# Patient Record
Sex: MALE | Race: WHITE | Employment: OTHER | URBAN - METROPOLITAN AREA
[De-identification: names, ages, dates, MRNs, and addresses within clinical notes are randomized per-mention and may not be internally consistent; named-entity substitution may affect disease eponyms.]

---

## 2017-01-03 PROBLEM — C82.01: Status: ACTIVE | Noted: 2017-01-03

## 2017-01-03 PROBLEM — Z08 ENCOUNTER FOR FOLLOW-UP EXAMINATION AFTER COMPLETED TREATMENT FOR MALIGNANT NEOPLASM: Status: ACTIVE | Noted: 2017-01-03

## 2017-03-10 ENCOUNTER — HOSPITAL ENCOUNTER (OUTPATIENT)
Dept: CARDIOLOGY | Age: 82
Discharge: HOME | End: 2017-03-10
Attending: INTERNAL MEDICINE | Admitting: INTERNAL MEDICINE

## 2017-03-10 DIAGNOSIS — I35.0 NONRHEUMATIC AORTIC VALVE STENOSIS: ICD-10-CM

## 2017-03-10 DIAGNOSIS — I34.0 NON-RHEUMATIC MITRAL REGURGITATION: ICD-10-CM

## 2017-03-10 LAB
LV EF: 60 %
LVEF MODALITY: NORMAL

## 2017-04-11 ENCOUNTER — OFFICE VISIT (OUTPATIENT)
Dept: FAMILY MEDICINE CLINIC | Age: 82
End: 2017-04-11

## 2017-04-11 VITALS
HEART RATE: 72 BPM | SYSTOLIC BLOOD PRESSURE: 130 MMHG | BODY MASS INDEX: 25.12 KG/M2 | HEIGHT: 69 IN | DIASTOLIC BLOOD PRESSURE: 86 MMHG | WEIGHT: 169.6 LBS | TEMPERATURE: 97.9 F

## 2017-04-11 DIAGNOSIS — M54.50 ACUTE RIGHT-SIDED LOW BACK PAIN WITHOUT SCIATICA: Primary | ICD-10-CM

## 2017-04-11 PROCEDURE — G8420 CALC BMI NORM PARAMETERS: HCPCS | Performed by: FAMILY MEDICINE

## 2017-04-11 PROCEDURE — 1123F ACP DISCUSS/DSCN MKR DOCD: CPT | Performed by: FAMILY MEDICINE

## 2017-04-11 PROCEDURE — 99213 OFFICE O/P EST LOW 20 MIN: CPT | Performed by: FAMILY MEDICINE

## 2017-04-11 PROCEDURE — 1036F TOBACCO NON-USER: CPT | Performed by: FAMILY MEDICINE

## 2017-04-11 PROCEDURE — G8427 DOCREV CUR MEDS BY ELIG CLIN: HCPCS | Performed by: FAMILY MEDICINE

## 2017-04-11 PROCEDURE — 4040F PNEUMOC VAC/ADMIN/RCVD: CPT | Performed by: FAMILY MEDICINE

## 2017-04-11 RX ORDER — ASCORBIC ACID 500 MG
500 TABLET ORAL DAILY
COMMUNITY

## 2017-04-11 ASSESSMENT — ENCOUNTER SYMPTOMS
SHORTNESS OF BREATH: 0
CHEST TIGHTNESS: 0
DIARRHEA: 0
ABDOMINAL PAIN: 0
VOMITING: 0
ABDOMINAL DISTENTION: 0
COUGH: 0
CONSTIPATION: 0
NAUSEA: 0
BLOOD IN STOOL: 0

## 2017-06-26 ENCOUNTER — OFFICE VISIT (OUTPATIENT)
Dept: FAMILY MEDICINE CLINIC | Age: 82
End: 2017-06-26

## 2017-06-26 VITALS
HEIGHT: 69 IN | SYSTOLIC BLOOD PRESSURE: 124 MMHG | BODY MASS INDEX: 24.35 KG/M2 | WEIGHT: 164.4 LBS | TEMPERATURE: 97.5 F | HEART RATE: 56 BPM | DIASTOLIC BLOOD PRESSURE: 82 MMHG

## 2017-06-26 DIAGNOSIS — Z12.5 PROSTATE CANCER SCREENING: ICD-10-CM

## 2017-06-26 DIAGNOSIS — E78.2 MIXED HYPERLIPIDEMIA: ICD-10-CM

## 2017-06-26 DIAGNOSIS — R79.89 ELEVATED LIVER FUNCTION TESTS: Primary | ICD-10-CM

## 2017-06-26 DIAGNOSIS — R79.89 ELEVATED LIVER FUNCTION TESTS: ICD-10-CM

## 2017-06-26 LAB
A/G RATIO: 1.7 (ref 1.1–2.2)
ALBUMIN SERPL-MCNC: 4 G/DL (ref 3.4–5)
ALP BLD-CCNC: 74 U/L (ref 40–129)
ALT SERPL-CCNC: 14 U/L (ref 10–40)
ANION GAP SERPL CALCULATED.3IONS-SCNC: 11 MMOL/L (ref 3–16)
AST SERPL-CCNC: 33 U/L (ref 15–37)
BILIRUB SERPL-MCNC: 0.8 MG/DL (ref 0–1)
BILIRUBIN URINE: NEGATIVE
BLOOD, URINE: NEGATIVE
BUN BLDV-MCNC: 23 MG/DL (ref 7–20)
CALCIUM SERPL-MCNC: 9 MG/DL (ref 8.3–10.6)
CHLORIDE BLD-SCNC: 102 MMOL/L (ref 99–110)
CHOLESTEROL, TOTAL: 229 MG/DL (ref 0–199)
CLARITY: CLEAR
CO2: 28 MMOL/L (ref 21–32)
COLOR: YELLOW
CREAT SERPL-MCNC: 1.1 MG/DL (ref 0.8–1.3)
EPITHELIAL CELLS, UA: 0 /HPF (ref 0–5)
GFR AFRICAN AMERICAN: >60
GFR NON-AFRICAN AMERICAN: >60
GLOBULIN: 2.4 G/DL
GLUCOSE BLD-MCNC: 86 MG/DL (ref 70–99)
GLUCOSE URINE: NEGATIVE MG/DL
HDLC SERPL-MCNC: 73 MG/DL (ref 40–60)
HYALINE CASTS: 1 /LPF (ref 0–8)
KETONES, URINE: NEGATIVE MG/DL
LDL CHOLESTEROL CALCULATED: 144 MG/DL
LEUKOCYTE ESTERASE, URINE: NEGATIVE
MICROSCOPIC EXAMINATION: ABNORMAL
NITRITE, URINE: NEGATIVE
PH UA: 6.5
POTASSIUM SERPL-SCNC: 5.1 MMOL/L (ref 3.5–5.1)
PROSTATE SPECIFIC ANTIGEN: 0.89 NG/ML (ref 0–4)
PROTEIN UA: NEGATIVE MG/DL
RBC UA: 5 /HPF (ref 0–4)
SODIUM BLD-SCNC: 141 MMOL/L (ref 136–145)
SPECIFIC GRAVITY UA: 1.02
TOTAL PROTEIN: 6.4 G/DL (ref 6.4–8.2)
TRIGL SERPL-MCNC: 61 MG/DL (ref 0–150)
UROBILINOGEN, URINE: 0.2 E.U./DL
VLDLC SERPL CALC-MCNC: 12 MG/DL
WBC UA: 0 /HPF (ref 0–5)

## 2017-06-26 PROCEDURE — G8420 CALC BMI NORM PARAMETERS: HCPCS | Performed by: FAMILY MEDICINE

## 2017-06-26 PROCEDURE — 4040F PNEUMOC VAC/ADMIN/RCVD: CPT | Performed by: FAMILY MEDICINE

## 2017-06-26 PROCEDURE — G8427 DOCREV CUR MEDS BY ELIG CLIN: HCPCS | Performed by: FAMILY MEDICINE

## 2017-06-26 PROCEDURE — 1123F ACP DISCUSS/DSCN MKR DOCD: CPT | Performed by: FAMILY MEDICINE

## 2017-06-26 PROCEDURE — 99213 OFFICE O/P EST LOW 20 MIN: CPT | Performed by: FAMILY MEDICINE

## 2017-06-26 PROCEDURE — 1036F TOBACCO NON-USER: CPT | Performed by: FAMILY MEDICINE

## 2017-06-26 ASSESSMENT — ENCOUNTER SYMPTOMS
NAUSEA: 0
ABDOMINAL PAIN: 0
SHORTNESS OF BREATH: 0
CONSTIPATION: 0
BLOOD IN STOOL: 0
VOMITING: 0
DIARRHEA: 0

## 2017-06-28 DIAGNOSIS — R31.29 MICROHEMATURIA: Primary | ICD-10-CM

## 2017-07-25 DIAGNOSIS — R31.29 MICROHEMATURIA: ICD-10-CM

## 2017-07-25 LAB
BILIRUBIN URINE: NEGATIVE
BLOOD, URINE: NEGATIVE
CLARITY: CLEAR
COLOR: YELLOW
EPITHELIAL CELLS, UA: 0 /HPF (ref 0–5)
GLUCOSE URINE: NEGATIVE MG/DL
HYALINE CASTS: 0 /LPF (ref 0–8)
KETONES, URINE: NEGATIVE MG/DL
LEUKOCYTE ESTERASE, URINE: NEGATIVE
MICROSCOPIC EXAMINATION: NORMAL
NITRITE, URINE: NEGATIVE
PH UA: 6
PROTEIN UA: NEGATIVE MG/DL
RBC UA: 1 /HPF (ref 0–4)
SPECIFIC GRAVITY UA: 1.01
UROBILINOGEN, URINE: 0.2 E.U./DL
WBC UA: 0 /HPF (ref 0–5)

## 2017-08-10 ENCOUNTER — TELEPHONE (OUTPATIENT)
Dept: FAMILY MEDICINE CLINIC | Age: 82
End: 2017-08-10

## 2017-09-07 ENCOUNTER — OFFICE VISIT (OUTPATIENT)
Dept: FAMILY MEDICINE CLINIC | Age: 82
End: 2017-09-07

## 2017-09-07 VITALS
TEMPERATURE: 97.7 F | HEIGHT: 69 IN | DIASTOLIC BLOOD PRESSURE: 80 MMHG | BODY MASS INDEX: 24.35 KG/M2 | WEIGHT: 164.4 LBS | SYSTOLIC BLOOD PRESSURE: 122 MMHG

## 2017-09-07 DIAGNOSIS — K40.90 NON-RECURRENT UNILATERAL INGUINAL HERNIA WITHOUT OBSTRUCTION OR GANGRENE: Primary | ICD-10-CM

## 2017-09-07 PROCEDURE — 4040F PNEUMOC VAC/ADMIN/RCVD: CPT | Performed by: FAMILY MEDICINE

## 2017-09-07 PROCEDURE — 1123F ACP DISCUSS/DSCN MKR DOCD: CPT | Performed by: FAMILY MEDICINE

## 2017-09-07 PROCEDURE — G8420 CALC BMI NORM PARAMETERS: HCPCS | Performed by: FAMILY MEDICINE

## 2017-09-07 PROCEDURE — 1036F TOBACCO NON-USER: CPT | Performed by: FAMILY MEDICINE

## 2017-09-07 PROCEDURE — 99213 OFFICE O/P EST LOW 20 MIN: CPT | Performed by: FAMILY MEDICINE

## 2017-09-07 PROCEDURE — G8427 DOCREV CUR MEDS BY ELIG CLIN: HCPCS | Performed by: FAMILY MEDICINE

## 2017-09-26 ENCOUNTER — INITIAL CONSULT (OUTPATIENT)
Dept: SURGERY | Age: 82
End: 2017-09-26

## 2017-09-26 VITALS
BODY MASS INDEX: 24.86 KG/M2 | DIASTOLIC BLOOD PRESSURE: 80 MMHG | WEIGHT: 164 LBS | SYSTOLIC BLOOD PRESSURE: 136 MMHG | HEIGHT: 68 IN

## 2017-09-26 DIAGNOSIS — K40.90 RIGHT INGUINAL HERNIA: Primary | ICD-10-CM

## 2017-09-26 PROCEDURE — 1123F ACP DISCUSS/DSCN MKR DOCD: CPT | Performed by: SURGERY

## 2017-09-26 PROCEDURE — G8420 CALC BMI NORM PARAMETERS: HCPCS | Performed by: SURGERY

## 2017-09-26 PROCEDURE — G8427 DOCREV CUR MEDS BY ELIG CLIN: HCPCS | Performed by: SURGERY

## 2017-09-26 PROCEDURE — 99203 OFFICE O/P NEW LOW 30 MIN: CPT | Performed by: SURGERY

## 2017-09-26 PROCEDURE — 1036F TOBACCO NON-USER: CPT | Performed by: SURGERY

## 2017-09-26 PROCEDURE — 4040F PNEUMOC VAC/ADMIN/RCVD: CPT | Performed by: SURGERY

## 2017-09-26 ASSESSMENT — ENCOUNTER SYMPTOMS: ABDOMINAL PAIN: 1

## 2017-10-09 ENCOUNTER — PAT TELEPHONE (OUTPATIENT)
Dept: PREADMISSION TESTING | Age: 82
End: 2017-10-09

## 2017-10-09 VITALS — HEIGHT: 70 IN | WEIGHT: 165 LBS | BODY MASS INDEX: 23.62 KG/M2

## 2017-10-09 NOTE — PROGRESS NOTES
C-Difficile admission screening and protocol:     * Admitted with diarrhea? YES____    NO___X__     *Prior history of C-Diff. In last 3 months? YES____   NO__X___     *Antibiotic use in the past 6-8 weeks? NO____X__YES______                 If yes which  ANTIBIOTIC AND REASON______     *Prior hospitalization or nursing home in the last month?  YES____   NO____X

## 2017-10-13 ENCOUNTER — HOSPITAL ENCOUNTER (OUTPATIENT)
Dept: SURGERY | Age: 82
Discharge: HOME | End: 2017-10-13
Attending: SURGERY | Admitting: SURGERY

## 2017-10-13 VITALS
HEART RATE: 74 BPM | WEIGHT: 165 LBS | HEIGHT: 70 IN | OXYGEN SATURATION: 95 % | DIASTOLIC BLOOD PRESSURE: 99 MMHG | TEMPERATURE: 97.6 F | SYSTOLIC BLOOD PRESSURE: 161 MMHG | RESPIRATION RATE: 16 BRPM | BODY MASS INDEX: 23.62 KG/M2

## 2017-10-13 PROCEDURE — 49505 PRP I/HERN INIT REDUC >5 YR: CPT | Performed by: SURGERY

## 2017-10-13 RX ORDER — OXYCODONE HYDROCHLORIDE 5 MG/1
5 TABLET ORAL EVERY 4 HOURS PRN
Qty: 30 TABLET | Refills: 0 | Status: SHIPPED | OUTPATIENT
Start: 2017-10-13 | End: 2017-11-07 | Stop reason: CLARIF

## 2017-10-13 RX ORDER — FENTANYL CITRATE 50 UG/ML
50 INJECTION, SOLUTION INTRAMUSCULAR; INTRAVENOUS EVERY 5 MIN PRN
Status: DISCONTINUED | OUTPATIENT
Start: 2017-10-13 | End: 2017-10-14 | Stop reason: HOSPADM

## 2017-10-13 RX ORDER — PROMETHAZINE HYDROCHLORIDE 25 MG/ML
6.25 INJECTION, SOLUTION INTRAMUSCULAR; INTRAVENOUS
Status: ACTIVE | OUTPATIENT
Start: 2017-10-13 | End: 2017-10-13

## 2017-10-13 RX ORDER — FENTANYL CITRATE 50 UG/ML
25 INJECTION, SOLUTION INTRAMUSCULAR; INTRAVENOUS EVERY 5 MIN PRN
Status: DISCONTINUED | OUTPATIENT
Start: 2017-10-13 | End: 2017-10-14 | Stop reason: HOSPADM

## 2017-10-13 RX ORDER — LABETALOL HYDROCHLORIDE 5 MG/ML
5 INJECTION, SOLUTION INTRAVENOUS EVERY 10 MIN PRN
Status: DISCONTINUED | OUTPATIENT
Start: 2017-10-13 | End: 2017-10-14 | Stop reason: HOSPADM

## 2017-10-13 ASSESSMENT — PAIN SCALES - GENERAL: PAINLEVEL_OUTOF10: 0

## 2017-10-13 ASSESSMENT — PAIN - FUNCTIONAL ASSESSMENT: PAIN_FUNCTIONAL_ASSESSMENT: 0-10

## 2017-10-13 NOTE — PROGRESS NOTES
Tolerates PO and sitting up in chair. Urgency to void. IV d/c'd. Pt dressing with wife's assistance.

## 2017-10-13 NOTE — ANESTHESIA POST-OP
[facility] Department of Anesthesiology  Post-Anesthesia Note       Name:  Glynn Kwok                                         Age:  80 y.o.   MRN:  [id number]     Last Vitals & Oxygen Saturation: BP (!) 161/99   Pulse 74   Temp 97.6 °F (36.4 °C) (Oral)   Resp 16   Ht 5' 10\" (1.778 m)   Wt 165 lb (74.8 kg)   SpO2 95%   BMI 23.68 kg/m²   Patient Vitals for the past 4 hrs:   BP Temp Temp src Pulse Resp SpO2   10/13/17 1612 (!) 161/99 97.6 °F (36.4 °C) Oral 74 16 -   10/13/17 1559 - - - 52 18 95 %   10/13/17 1556 - - - 59 20 100 %   10/13/17 1555 - - - 58 15 100 %   10/13/17 1547 (!) 137/91 - - 53 13 97 %   10/13/17 1540 133/80 - - 55 14 97 %   10/13/17 1535 119/83 - - 56 14 98 %   10/13/17 1532 121/76 - - 55 9 98 %   10/13/17 1524 106/70 97.6 °F (36.4 °C) Temporal 62 12 99 %       Level of consciousness: awake, alert and oriented    Respiratory: stable     Cardiovascular: stable     Hydration: stable     PONV: stable     Post-op pain: adequate analgesia    Post-op assessment: no apparent anesthetic complications and tolerated procedure well    Complications:  none    Dennis Banuelos MD  October 13, 2017   6:24 PM

## 2017-10-13 NOTE — PROGRESS NOTES
Pt alert. Denies pain. Up to chair. Gait slightly unsteady. Given crackers and juice. Called for wife. Given call light.

## 2017-11-07 ENCOUNTER — OFFICE VISIT (OUTPATIENT)
Dept: SURGERY | Age: 82
End: 2017-11-07

## 2017-11-07 ENCOUNTER — NURSE ONLY (OUTPATIENT)
Dept: FAMILY MEDICINE CLINIC | Age: 82
End: 2017-11-07

## 2017-11-07 DIAGNOSIS — Z23 NEEDS FLU SHOT: Primary | ICD-10-CM

## 2017-11-07 DIAGNOSIS — K40.90 RIGHT INGUINAL HERNIA: Primary | ICD-10-CM

## 2017-11-07 PROCEDURE — 90662 IIV NO PRSV INCREASED AG IM: CPT | Performed by: FAMILY MEDICINE

## 2017-11-07 PROCEDURE — 99024 POSTOP FOLLOW-UP VISIT: CPT | Performed by: SURGERY

## 2017-11-07 PROCEDURE — G0008 ADMIN INFLUENZA VIRUS VAC: HCPCS | Performed by: FAMILY MEDICINE

## 2017-12-29 ENCOUNTER — OFFICE VISIT (OUTPATIENT)
Dept: FAMILY MEDICINE CLINIC | Age: 82
End: 2017-12-29

## 2017-12-29 VITALS
SYSTOLIC BLOOD PRESSURE: 130 MMHG | HEART RATE: 68 BPM | WEIGHT: 172.4 LBS | TEMPERATURE: 98 F | DIASTOLIC BLOOD PRESSURE: 78 MMHG | HEIGHT: 70 IN | BODY MASS INDEX: 24.68 KG/M2

## 2017-12-29 DIAGNOSIS — E78.2 MIXED HYPERLIPIDEMIA: Primary | ICD-10-CM

## 2017-12-29 PROCEDURE — 1036F TOBACCO NON-USER: CPT | Performed by: FAMILY MEDICINE

## 2017-12-29 PROCEDURE — G8420 CALC BMI NORM PARAMETERS: HCPCS | Performed by: FAMILY MEDICINE

## 2017-12-29 PROCEDURE — G8484 FLU IMMUNIZE NO ADMIN: HCPCS | Performed by: FAMILY MEDICINE

## 2017-12-29 PROCEDURE — 1123F ACP DISCUSS/DSCN MKR DOCD: CPT | Performed by: FAMILY MEDICINE

## 2017-12-29 PROCEDURE — G8427 DOCREV CUR MEDS BY ELIG CLIN: HCPCS | Performed by: FAMILY MEDICINE

## 2017-12-29 PROCEDURE — 4040F PNEUMOC VAC/ADMIN/RCVD: CPT | Performed by: FAMILY MEDICINE

## 2017-12-29 PROCEDURE — 99213 OFFICE O/P EST LOW 20 MIN: CPT | Performed by: FAMILY MEDICINE

## 2017-12-29 ASSESSMENT — ENCOUNTER SYMPTOMS
VOMITING: 0
SHORTNESS OF BREATH: 0
NAUSEA: 0
BLOOD IN STOOL: 0
ABDOMINAL PAIN: 0
CONSTIPATION: 0
DIARRHEA: 0
ABDOMINAL DISTENTION: 0
CHEST TIGHTNESS: 0

## 2018-05-16 NOTE — ANESTHESIA PRE-OP
[facility] Department of Anesthesiology  Pre-Anesthesia Evaluation/Consultation       Name:  Karolina Huang  : 1931  Age:  80 y.o. MRN:  [id number]  Date: 10/13/2017           Procedure (Scheduled):  Right inguinal hernia repair  Surgeon:  Dr. 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  Pt active plays basketball once a week     Medications  Current Outpatient Prescriptions on File Prior to Encounter   Medication Sig Dispense Refill    aspirin 81 MG tablet Take 81 mg by mouth daily      Omega-3 Fatty Acids (FISH OIL PO) Take by mouth      Ascorbic Acid (VITAMIN C) 500 MG tablet Take 500 mg by mouth daily      sildenafil (REVATIO) 20 MG tablet Take 1 tablet by mouth as needed (1 or 2 tablets as needed for erections on empty stomach) 15 tablet 1    Vitamin D (CHOLECALCIFEROL) 1000 UNITS CAPS capsule Take No 1,000 Units by mouth daily.  Multiple Vitamins-Minerals (MULTI COMPLETE PO) Take 1 tablet by mouth daily. No current facility-administered medications on file prior to encounter. Current Outpatient Prescriptions   Medication Sig Dispense Refill    aspirin 81 MG tablet Take 81 mg by mouth daily      Omega-3 Fatty Acids (FISH OIL PO) Take by mouth      Ascorbic Acid (VITAMIN C) 500 MG tablet Take 500 mg by mouth daily      sildenafil (REVATIO) 20 MG tablet Take 1 tablet by mouth as needed (1 or 2 tablets as needed for erections on empty stomach) 15 tablet 1    Vitamin D (CHOLECALCIFEROL) 1000 UNITS CAPS capsule Take 1,000 Units by mouth daily.  Multiple Vitamins-Minerals (MULTI COMPLETE PO) Take 1 tablet by mouth daily. Current Facility-Administered Medications   Medication Dose Route Frequency Provider Last Rate Last Dose    ceFAZolin (ANCEF) 2 g in dextrose 3 % 50 mL IVPB (duplex)  2 g Intravenous Once Tere Patel MD         Vital Signs (Current)   Vitals:    10/13/17 1201   BP: (!) 155/92   Pulse: 58   Resp: 16   Temp: 97.3 °F (36.3 °C)   SpO2: 97%     Vital Signs Statistics (for past 48 hrs)     Temp  Av.3 °F (36.3 °C)  Min: 97.3 °F (36.3 °C)   Min taken time: 10/13/17 1201  Max: 97.3 °F (36.3 °C)   Max taken time: 10/13/17 1201  Pulse  Av  Min: 62   Min taken time: 10/13/17 1201  Max: 62   Max taken time: 10/13/17 1201  Resp  Av  Min: 16   Min taken time: 10/13/17 1201  Max: 16   Max taken time: 10/13/17 1201  BP  Min: 155/92   Min taken time: 10/13/17 1201  Max: 155/92   Max taken time: 10/13/17 1201  SpO2  Av %  Min: 97 %   Min taken time: 10/13/17 1201  Max: 97 %   Max taken time: 10/13/17 1201    BP Readings from Last 3 Encounters:   10/13/17 (!) 155/92   17 136/80   17 122/80     BMI  Body mass index is 23.68 kg/m².   Estimated body mass index is 23.68 kg/m² as calculated from the following:    Height as of this encounter: 5' 10\" (1.778 m). Weight as of this encounter: 165 lb (74.8 kg). CBC   Lab Results   Component Value Date    WBC 5.3 01/03/2017    WBC 4.8 08/03/2015    RBC 4.01 01/03/2017    HGB 12.5 01/03/2017    HCT 40.9 01/03/2017    .1 01/03/2017    RDW 12.9 01/03/2017     01/03/2017     CMP    Lab Results   Component Value Date     06/26/2017    K 5.1 06/26/2017     06/26/2017    CO2 28 06/26/2017    BUN 23 06/26/2017    CREATININE 1.1 06/26/2017    GFRAA >60 06/26/2017    GFRAA >60 01/31/2013    AGRATIO 1.7 06/26/2017    LABGLOM >60 06/26/2017    GLUCOSE 86 06/26/2017    GLUCOSE 97 01/03/2017    PROT 6.4 06/26/2017    PROT 6.9 01/03/2017    CALCIUM 9.0 06/26/2017    BILITOT 0.8 06/26/2017    ALKPHOS 74 06/26/2017    AST 33 06/26/2017    ALT 14 06/26/2017     BMP    Lab Results   Component Value Date     06/26/2017    K 5.1 06/26/2017     06/26/2017    CO2 28 06/26/2017    BUN 23 06/26/2017    CREATININE 1.1 06/26/2017    CALCIUM 9.0 06/26/2017    GFRAA >60 06/26/2017    GFRAA >60 01/31/2013    LABGLOM >60 06/26/2017    GLUCOSE 86 06/26/2017    GLUCOSE 97 01/03/2017     POCGlucose  No results for input(s): GLUCOSE in the last 72 hours.    Coags  No results found for: PROTIME, INR, APTT  HCG (If Applicable) No results found for: PREGTESTUR, PREGSERUM, HCG, HCGQUANT   ABGs No results found for: PHART, PO2ART, JPV0RFT, OHV4AQL, BEART, S2QBOSZP   Type & Screen (If Applicable)  No results found for: LABABO, LABRH                         BMI: Wt Readings from Last 3 Encounters:       NPO Status:   Date of last liquid consumption: 10/12/17   Time of last liquid consumption: 2300   Date of last solid food consumption: 10/12/17      Time of last solid consumption: 2300       Anesthesia Evaluation  Patient summary reviewed no history of anesthetic complications:   Airway: Mallampati: II  TM distance: >3 FB   Neck ROM: full  Mouth opening: > = 3 FB Dental: normal exam         Pulmonary:negative ROS

## 2018-09-04 ENCOUNTER — TELEPHONE (OUTPATIENT)
Dept: FAMILY MEDICINE CLINIC | Age: 83
End: 2018-09-04

## 2018-10-24 ENCOUNTER — NURSE ONLY (OUTPATIENT)
Dept: FAMILY MEDICINE CLINIC | Age: 83
End: 2018-10-24
Payer: MEDICARE

## 2018-10-24 DIAGNOSIS — Z23 NEEDS FLU SHOT: Primary | ICD-10-CM

## 2018-10-24 PROCEDURE — 90662 IIV NO PRSV INCREASED AG IM: CPT | Performed by: FAMILY MEDICINE

## 2018-10-24 PROCEDURE — G0008 ADMIN INFLUENZA VIRUS VAC: HCPCS | Performed by: FAMILY MEDICINE

## 2018-10-24 NOTE — PROGRESS NOTES
Vaccine Information Sheet, \"Influenza - Inactivated\"  given to Deidra Clark, or parent/legal guardian of  Deidra Clark and verbalized understanding. Patient responses:    Have you ever had a reaction to a flu vaccine? No  Are you able to eat eggs without adverse effects? Yes  Do you have any current illness? No  Have you ever had Guillian Sunbright Syndrome? No    Flu vaccine given per order. Please see immunization tab.

## 2019-09-06 ENCOUNTER — OFFICE VISIT (OUTPATIENT)
Dept: FAMILY MEDICINE CLINIC | Age: 84
End: 2019-09-06
Payer: MEDICARE

## 2019-09-06 VITALS
WEIGHT: 156.6 LBS | BODY MASS INDEX: 22.42 KG/M2 | HEIGHT: 70 IN | DIASTOLIC BLOOD PRESSURE: 84 MMHG | TEMPERATURE: 97.6 F | HEART RATE: 60 BPM | SYSTOLIC BLOOD PRESSURE: 124 MMHG

## 2019-09-06 DIAGNOSIS — R63.4 WEIGHT LOSS: Primary | ICD-10-CM

## 2019-09-06 DIAGNOSIS — R63.4 WEIGHT LOSS: ICD-10-CM

## 2019-09-06 DIAGNOSIS — I34.0 MITRAL VALVE INSUFFICIENCY, UNSPECIFIED ETIOLOGY: ICD-10-CM

## 2019-09-06 LAB
BASOPHILS ABSOLUTE: 0 K/UL (ref 0–0.2)
BASOPHILS RELATIVE PERCENT: 0.5 %
BILIRUBIN URINE: NEGATIVE
BLOOD, URINE: NEGATIVE
CLARITY: CLEAR
COLOR: YELLOW
EOSINOPHILS ABSOLUTE: 0.2 K/UL (ref 0–0.6)
EOSINOPHILS RELATIVE PERCENT: 3.2 %
EPITHELIAL CELLS, UA: 0 /HPF (ref 0–5)
GLUCOSE URINE: NEGATIVE MG/DL
HCT VFR BLD CALC: 41.7 % (ref 40.5–52.5)
HEMOGLOBIN: 13.6 G/DL (ref 13.5–17.5)
HYALINE CASTS: 1 /LPF (ref 0–8)
KETONES, URINE: NEGATIVE MG/DL
LEUKOCYTE ESTERASE, URINE: NEGATIVE
LYMPHOCYTES ABSOLUTE: 0.4 K/UL (ref 1–5.1)
LYMPHOCYTES RELATIVE PERCENT: 8.3 %
MCH RBC QN AUTO: 33.7 PG (ref 26–34)
MCHC RBC AUTO-ENTMCNC: 32.6 G/DL (ref 31–36)
MCV RBC AUTO: 103.4 FL (ref 80–100)
MICROSCOPIC EXAMINATION: NORMAL
MONOCYTES ABSOLUTE: 0.4 K/UL (ref 0–1.3)
MONOCYTES RELATIVE PERCENT: 8.9 %
NEUTROPHILS ABSOLUTE: 3.8 K/UL (ref 1.7–7.7)
NEUTROPHILS RELATIVE PERCENT: 79.1 %
NITRITE, URINE: NEGATIVE
PDW BLD-RTO: 13.7 % (ref 12.4–15.4)
PH UA: 6.5 (ref 5–8)
PLATELET # BLD: 176 K/UL (ref 135–450)
PMV BLD AUTO: 8.2 FL (ref 5–10.5)
PROTEIN UA: NEGATIVE MG/DL
RBC # BLD: 4.03 M/UL (ref 4.2–5.9)
RBC UA: 2 /HPF (ref 0–4)
SPECIFIC GRAVITY UA: 1.02 (ref 1–1.03)
UROBILINOGEN, URINE: 0.2 E.U./DL
WBC # BLD: 4.8 K/UL (ref 4–11)
WBC UA: 0 /HPF (ref 0–5)

## 2019-09-06 PROCEDURE — 1036F TOBACCO NON-USER: CPT | Performed by: FAMILY MEDICINE

## 2019-09-06 PROCEDURE — 4040F PNEUMOC VAC/ADMIN/RCVD: CPT | Performed by: FAMILY MEDICINE

## 2019-09-06 PROCEDURE — 3288F FALL RISK ASSESSMENT DOCD: CPT | Performed by: FAMILY MEDICINE

## 2019-09-06 PROCEDURE — G8420 CALC BMI NORM PARAMETERS: HCPCS | Performed by: FAMILY MEDICINE

## 2019-09-06 PROCEDURE — G8510 SCR DEP NEG, NO PLAN REQD: HCPCS | Performed by: FAMILY MEDICINE

## 2019-09-06 PROCEDURE — 1123F ACP DISCUSS/DSCN MKR DOCD: CPT | Performed by: FAMILY MEDICINE

## 2019-09-06 PROCEDURE — G8427 DOCREV CUR MEDS BY ELIG CLIN: HCPCS | Performed by: FAMILY MEDICINE

## 2019-09-06 PROCEDURE — 99213 OFFICE O/P EST LOW 20 MIN: CPT | Performed by: FAMILY MEDICINE

## 2019-09-06 ASSESSMENT — PATIENT HEALTH QUESTIONNAIRE - PHQ9
2. FEELING DOWN, DEPRESSED OR HOPELESS: 0
SUM OF ALL RESPONSES TO PHQ QUESTIONS 1-9: 0
SUM OF ALL RESPONSES TO PHQ9 QUESTIONS 1 & 2: 0
1. LITTLE INTEREST OR PLEASURE IN DOING THINGS: 0
SUM OF ALL RESPONSES TO PHQ QUESTIONS 1-9: 0

## 2019-09-06 NOTE — PROGRESS NOTES
Subjective:      Patient ID: Francisco Ascencio is a 80 y.o. male. Patient presents with:  Check-Up: pt is fasting    He is doing actually very well  He retired from basketball as his age group is smaller and he pauline have to move down to younger age     He has no c/o  No ha no cp no sob  No abdomen pain  No bm issue no v no n  No blood in stool  No urine pain or blood  Appetite is ok      YOB: 1931    Date of Visit:  9/6/2019     -- Eggs Or Egg-Derived Products -- Other (See Comments)    --  headaches    Current Outpatient Medications:  aspirin 81 MG tablet, Take 81 mg by mouth daily, Disp: , Rfl:   Omega-3 Fatty Acids (FISH OIL PO), Take by mouth, Disp: , Rfl:   Ascorbic Acid (VITAMIN C) 500 MG tablet, Take 500 mg by mouth daily, Disp: , Rfl:   sildenafil (REVATIO) 20 MG tablet, Take 1 tablet by mouth as needed (1 or 2 tablets as needed for erections on empty stomach) (Patient not taking: Reported on 9/6/2019), Disp: 15 tablet, Rfl: 1  Vitamin D (CHOLECALCIFEROL) 1000 UNITS CAPS capsule, Take 1,000 Units by mouth daily. , Disp: , Rfl:   Multiple Vitamins-Minerals (MULTI COMPLETE PO), Take 1 tablet by mouth daily. , Disp: , Rfl:     No current facility-administered medications for this visit.       -------------------------------                 09/06/19                          1025           -------------------------------   BP:            124/84           Site:      Left Upper Arm       Position:       Sitting          Cuff Size:    Medium Adult        Pulse:           60             Temp:     97.6 °F (36.4 °C)     TempSrc:        Oral            Weight: 156 lb 9.6 oz (71 kg)   Height:   5' 10\" (1.778 m)     -------------------------------  Body mass index is 22.47 kg/m².      Wt Readings from Last 3 Encounters:  He indicates he just has not eaten a lot over time  09/06/19 : 156 lb 9.6 oz (71 kg)  12/29/17 : 172 lb 6.4 oz (78.2 kg)  10/13/17 : 165 lb (74.8 kg)    BP Readings from Last 3 Encounters:  09/06/19 : 124/84  12/29/17 : 130/78  10/13/17 : (!) 161/99        Review of Systems    Objective:   Physical Exam   Constitutional: He appears well-developed and well-nourished. No distress. Cardiovascular: Normal rate and regular rhythm. Exam reveals no gallop and no friction rub. Murmur heard. Systolic murmur is present with a grade of 2/6. Pulmonary/Chest: Effort normal and breath sounds normal. No accessory muscle usage. No tachypnea. No respiratory distress. He has no decreased breath sounds. He has no wheezes. He has no rhonchi. He has no rales. Abdominal: Soft. Normal appearance and bowel sounds are normal. He exhibits no distension, no abdominal bruit and no mass. There is no hepatosplenomegaly. There is no tenderness. There is no rigidity and no guarding. Lymphadenopathy:     He has no cervical adenopathy. Right: No supraclavicular adenopathy present. Left: No supraclavicular adenopathy present. Neurological: He is alert. He displays no tremor. Skin: Skin is warm, dry and intact. He is not diaphoretic. No cyanosis. No pallor. Nails show no clubbing. Psychiatric: He has a normal mood and affect. Assessment:       Diagnosis Orders   1. Weight loss  Urinalysis with Microscopic    Comprehensive Metabolic Panel    CBC Auto Differential    TSH without Reflex   2. Mitral valve insufficiency, unspecified etiology       Discussed with wife also. He has had constipation for years and wanted otc med. Suggested colace  I also indicated we could have dr Beth Mccoy see again.  They wanted to wait      Plan:      Do see dr Duarte Rai for a check up on the heart  Get the blood and urine testing today  See me back in 2 months for a check on the weight        Carolina Mitchell MD

## 2019-09-07 LAB
A/G RATIO: 1.4 (ref 1.1–2.2)
ALBUMIN SERPL-MCNC: 4.1 G/DL (ref 3.4–5)
ALP BLD-CCNC: 74 U/L (ref 40–129)
ALT SERPL-CCNC: 16 U/L (ref 10–40)
ANION GAP SERPL CALCULATED.3IONS-SCNC: 15 MMOL/L (ref 3–16)
AST SERPL-CCNC: 43 U/L (ref 15–37)
BILIRUB SERPL-MCNC: 0.7 MG/DL (ref 0–1)
BUN BLDV-MCNC: 23 MG/DL (ref 7–20)
CALCIUM SERPL-MCNC: 9.4 MG/DL (ref 8.3–10.6)
CHLORIDE BLD-SCNC: 103 MMOL/L (ref 99–110)
CO2: 21 MMOL/L (ref 21–32)
CREAT SERPL-MCNC: 1.1 MG/DL (ref 0.8–1.3)
GFR AFRICAN AMERICAN: >60
GFR NON-AFRICAN AMERICAN: >60
GLOBULIN: 2.9 G/DL
GLUCOSE BLD-MCNC: 74 MG/DL (ref 70–99)
POTASSIUM SERPL-SCNC: 5.5 MMOL/L (ref 3.5–5.1)
SODIUM BLD-SCNC: 139 MMOL/L (ref 136–145)
TOTAL PROTEIN: 7 G/DL (ref 6.4–8.2)
TSH SERPL DL<=0.05 MIU/L-ACNC: 2.87 UIU/ML (ref 0.27–4.2)

## 2019-09-10 DIAGNOSIS — E87.5 HIGH POTASSIUM: Primary | ICD-10-CM

## 2019-09-13 ENCOUNTER — TELEPHONE (OUTPATIENT)
Dept: FAMILY MEDICINE CLINIC | Age: 84
End: 2019-09-13

## 2019-09-20 DIAGNOSIS — E87.5 HIGH POTASSIUM: ICD-10-CM

## 2019-09-20 LAB — POTASSIUM SERPL-SCNC: 5.4 MMOL/L (ref 3.5–5.1)

## 2019-09-23 ENCOUNTER — NURSE ONLY (OUTPATIENT)
Dept: FAMILY MEDICINE CLINIC | Age: 84
End: 2019-09-23
Payer: MEDICARE

## 2019-09-23 DIAGNOSIS — Z23 NEEDS FLU SHOT: Primary | ICD-10-CM

## 2019-09-23 PROCEDURE — G0008 ADMIN INFLUENZA VIRUS VAC: HCPCS | Performed by: FAMILY MEDICINE

## 2019-09-23 PROCEDURE — 90653 IIV ADJUVANT VACCINE IM: CPT | Performed by: FAMILY MEDICINE

## 2019-09-24 DIAGNOSIS — E87.5 HIGH POTASSIUM: Primary | ICD-10-CM

## 2019-10-03 DIAGNOSIS — E87.5 HIGH POTASSIUM: ICD-10-CM

## 2019-10-03 LAB — POTASSIUM SERPL-SCNC: 5.3 MMOL/L (ref 3.5–5.1)

## 2019-10-10 ENCOUNTER — OFFICE VISIT (OUTPATIENT)
Dept: FAMILY MEDICINE CLINIC | Age: 84
End: 2019-10-10
Payer: MEDICARE

## 2019-10-10 VITALS
HEART RATE: 68 BPM | HEIGHT: 70 IN | DIASTOLIC BLOOD PRESSURE: 80 MMHG | SYSTOLIC BLOOD PRESSURE: 112 MMHG | TEMPERATURE: 97.8 F | WEIGHT: 158 LBS | BODY MASS INDEX: 22.62 KG/M2

## 2019-10-10 DIAGNOSIS — Z85.79 H/O LYMPHOMA: ICD-10-CM

## 2019-10-10 DIAGNOSIS — E87.5 SERUM POTASSIUM ELEVATED: ICD-10-CM

## 2019-10-10 DIAGNOSIS — R63.4 WEIGHT LOSS: Primary | ICD-10-CM

## 2019-10-10 PROCEDURE — G8427 DOCREV CUR MEDS BY ELIG CLIN: HCPCS | Performed by: FAMILY MEDICINE

## 2019-10-10 PROCEDURE — G8420 CALC BMI NORM PARAMETERS: HCPCS | Performed by: FAMILY MEDICINE

## 2019-10-10 PROCEDURE — 1036F TOBACCO NON-USER: CPT | Performed by: FAMILY MEDICINE

## 2019-10-10 PROCEDURE — 1123F ACP DISCUSS/DSCN MKR DOCD: CPT | Performed by: FAMILY MEDICINE

## 2019-10-10 PROCEDURE — G8482 FLU IMMUNIZE ORDER/ADMIN: HCPCS | Performed by: FAMILY MEDICINE

## 2019-10-10 PROCEDURE — 4040F PNEUMOC VAC/ADMIN/RCVD: CPT | Performed by: FAMILY MEDICINE

## 2019-10-10 PROCEDURE — 99213 OFFICE O/P EST LOW 20 MIN: CPT | Performed by: FAMILY MEDICINE

## 2019-10-18 DIAGNOSIS — R63.4 WEIGHT LOSS: ICD-10-CM

## 2019-10-18 DIAGNOSIS — E87.5 SERUM POTASSIUM ELEVATED: ICD-10-CM

## 2019-10-18 DIAGNOSIS — Z85.72 H/O LYMPHOMA: ICD-10-CM

## 2019-10-18 LAB
CREAT SERPL-MCNC: 1.2 MG/DL (ref 0.8–1.3)
GFR AFRICAN AMERICAN: >60
GFR NON-AFRICAN AMERICAN: 57
POTASSIUM SERPL-SCNC: 5.5 MMOL/L (ref 3.5–5.1)

## 2019-10-25 ENCOUNTER — HOSPITAL ENCOUNTER (OUTPATIENT)
Dept: CT IMAGING | Age: 84
Discharge: HOME | End: 2019-10-25
Payer: MEDICARE

## 2019-10-25 DIAGNOSIS — R63.4 WEIGHT LOSS: ICD-10-CM

## 2019-10-25 DIAGNOSIS — Z85.79 H/O LYMPHOMA: ICD-10-CM

## 2019-10-25 PROCEDURE — 74177 CT ABD & PELVIS W/CONTRAST: CPT

## 2019-10-25 PROCEDURE — 71260 CT THORAX DX C+: CPT

## 2019-10-25 PROCEDURE — 6360000004 HC RX CONTRAST MEDICATION: Performed by: FAMILY MEDICINE

## 2019-10-25 RX ADMIN — IOVERSOL 100 ML: 678 INJECTION INTRA-ARTERIAL; INTRAVENOUS at 09:41

## 2019-10-25 RX ADMIN — IOHEXOL 50 ML: 240 INJECTION, SOLUTION INTRATHECAL; INTRAVASCULAR; INTRAVENOUS; ORAL at 08:17

## 2019-11-05 ENCOUNTER — OFFICE VISIT (OUTPATIENT)
Dept: FAMILY MEDICINE CLINIC | Age: 84
End: 2019-11-05
Payer: MEDICARE

## 2019-11-05 VITALS
TEMPERATURE: 97.5 F | HEART RATE: 56 BPM | WEIGHT: 163 LBS | DIASTOLIC BLOOD PRESSURE: 80 MMHG | BODY MASS INDEX: 23.39 KG/M2 | SYSTOLIC BLOOD PRESSURE: 130 MMHG

## 2019-11-05 DIAGNOSIS — I35.0 NONRHEUMATIC AORTIC VALVE STENOSIS: ICD-10-CM

## 2019-11-05 DIAGNOSIS — R01.1 HEART MURMUR, SYSTOLIC: Primary | ICD-10-CM

## 2019-11-05 DIAGNOSIS — E87.5 HIGH POTASSIUM: ICD-10-CM

## 2019-11-05 DIAGNOSIS — R41.3 EPISODE OF MEMORY LOSS: ICD-10-CM

## 2019-11-05 LAB — POTASSIUM SERPL-SCNC: 4.2 MMOL/L (ref 3.5–5.1)

## 2019-11-05 PROCEDURE — G8427 DOCREV CUR MEDS BY ELIG CLIN: HCPCS | Performed by: FAMILY MEDICINE

## 2019-11-05 PROCEDURE — 1036F TOBACCO NON-USER: CPT | Performed by: FAMILY MEDICINE

## 2019-11-05 PROCEDURE — 1123F ACP DISCUSS/DSCN MKR DOCD: CPT | Performed by: FAMILY MEDICINE

## 2019-11-05 PROCEDURE — 99213 OFFICE O/P EST LOW 20 MIN: CPT | Performed by: FAMILY MEDICINE

## 2019-11-05 PROCEDURE — G8482 FLU IMMUNIZE ORDER/ADMIN: HCPCS | Performed by: FAMILY MEDICINE

## 2019-11-05 PROCEDURE — G8420 CALC BMI NORM PARAMETERS: HCPCS | Performed by: FAMILY MEDICINE

## 2019-11-05 PROCEDURE — 4040F PNEUMOC VAC/ADMIN/RCVD: CPT | Performed by: FAMILY MEDICINE

## 2019-11-06 LAB
FOLATE: 14.34 NG/ML (ref 4.78–24.2)
VITAMIN B-12: 236 PG/ML (ref 211–911)

## 2019-12-05 ENCOUNTER — OFFICE VISIT (OUTPATIENT)
Dept: FAMILY MEDICINE CLINIC | Age: 84
End: 2019-12-05
Payer: MEDICARE

## 2019-12-05 VITALS
TEMPERATURE: 97.3 F | BODY MASS INDEX: 22.76 KG/M2 | SYSTOLIC BLOOD PRESSURE: 130 MMHG | WEIGHT: 159 LBS | HEIGHT: 70 IN | DIASTOLIC BLOOD PRESSURE: 84 MMHG

## 2019-12-05 DIAGNOSIS — R63.4 WEIGHT LOSS: Primary | ICD-10-CM

## 2019-12-05 PROCEDURE — 4040F PNEUMOC VAC/ADMIN/RCVD: CPT | Performed by: FAMILY MEDICINE

## 2019-12-05 PROCEDURE — G8482 FLU IMMUNIZE ORDER/ADMIN: HCPCS | Performed by: FAMILY MEDICINE

## 2019-12-05 PROCEDURE — G8420 CALC BMI NORM PARAMETERS: HCPCS | Performed by: FAMILY MEDICINE

## 2019-12-05 PROCEDURE — G8427 DOCREV CUR MEDS BY ELIG CLIN: HCPCS | Performed by: FAMILY MEDICINE

## 2019-12-05 PROCEDURE — 99213 OFFICE O/P EST LOW 20 MIN: CPT | Performed by: FAMILY MEDICINE

## 2019-12-05 PROCEDURE — 1123F ACP DISCUSS/DSCN MKR DOCD: CPT | Performed by: FAMILY MEDICINE

## 2019-12-05 PROCEDURE — 1036F TOBACCO NON-USER: CPT | Performed by: FAMILY MEDICINE

## 2019-12-07 ENCOUNTER — HOSPITAL ENCOUNTER (OUTPATIENT)
Dept: CT IMAGING | Age: 84
Discharge: HOME | End: 2019-12-07
Payer: MEDICARE

## 2019-12-07 ENCOUNTER — HOSPITAL ENCOUNTER (OUTPATIENT)
Dept: NON INVASIVE DIAGNOSTICS | Age: 84
Discharge: HOME | End: 2019-12-07
Payer: MEDICARE

## 2019-12-07 DIAGNOSIS — R41.3 EPISODE OF MEMORY LOSS: ICD-10-CM

## 2019-12-07 DIAGNOSIS — R01.1 HEART MURMUR, SYSTOLIC: ICD-10-CM

## 2019-12-07 DIAGNOSIS — I35.0 NONRHEUMATIC AORTIC VALVE STENOSIS: ICD-10-CM

## 2019-12-07 LAB
LV EF: 63 %
LVEF MODALITY: NORMAL

## 2019-12-07 PROCEDURE — 93306 TTE W/DOPPLER COMPLETE: CPT

## 2019-12-07 PROCEDURE — 70450 CT HEAD/BRAIN W/O DYE: CPT

## 2020-02-28 ENCOUNTER — OFFICE VISIT (OUTPATIENT)
Dept: CARDIOLOGY CLINIC | Age: 85
End: 2020-02-28
Payer: MEDICARE

## 2020-02-28 VITALS
HEIGHT: 70 IN | BODY MASS INDEX: 23.05 KG/M2 | HEART RATE: 65 BPM | SYSTOLIC BLOOD PRESSURE: 150 MMHG | WEIGHT: 161 LBS | OXYGEN SATURATION: 97 % | DIASTOLIC BLOOD PRESSURE: 80 MMHG

## 2020-02-28 PROCEDURE — 1123F ACP DISCUSS/DSCN MKR DOCD: CPT | Performed by: INTERNAL MEDICINE

## 2020-02-28 PROCEDURE — G8482 FLU IMMUNIZE ORDER/ADMIN: HCPCS | Performed by: INTERNAL MEDICINE

## 2020-02-28 PROCEDURE — 4040F PNEUMOC VAC/ADMIN/RCVD: CPT | Performed by: INTERNAL MEDICINE

## 2020-02-28 PROCEDURE — 1036F TOBACCO NON-USER: CPT | Performed by: INTERNAL MEDICINE

## 2020-02-28 PROCEDURE — G8428 CUR MEDS NOT DOCUMENT: HCPCS | Performed by: INTERNAL MEDICINE

## 2020-02-28 PROCEDURE — 99213 OFFICE O/P EST LOW 20 MIN: CPT | Performed by: INTERNAL MEDICINE

## 2020-02-28 PROCEDURE — G8420 CALC BMI NORM PARAMETERS: HCPCS | Performed by: INTERNAL MEDICINE

## 2020-02-28 PROCEDURE — 93000 ELECTROCARDIOGRAM COMPLETE: CPT | Performed by: INTERNAL MEDICINE

## 2020-02-28 NOTE — PATIENT INSTRUCTIONS
trans fat  · Read food labels, and try to avoid saturated and trans fats. They increase your risk of heart disease. Trans fat is found in many processed foods such as cookies and crackers. · Use olive or canola oil when you cook. Try cholesterol-lowering spreads, such as Benecol or Take Control. · Bake, broil, grill, or steam foods instead of frying them. · Choose lean meats instead of high-fat meats such as hot dogs and sausages. Cut off all visible fat when you prepare meat. · Eat fish, skinless poultry, and meat alternatives such as soy products instead of high-fat meats. Soy products, such as tofu, may be especially good for your heart. · Choose low-fat or fat-free milk and dairy products. Eat foods high in fiber  · Eat a variety of grain products every day. Include whole-grain foods that have lots of fiber and nutrients. Examples of whole-grain foods include oats, whole wheat bread, and brown rice. · Buy whole-grain breads and cereals, instead of white bread or pastries. Limit salt and sodium  · Limit how much salt and sodium you eat to help lower your blood pressure. · Taste food before you salt it. Add only a little salt when you think you need it. With time, your taste buds will adjust to less salt. · Eat fewer snack items, fast foods, and other high-salt, processed foods. Check food labels for the amount of sodium in packaged foods. · Choose low-sodium versions of canned goods (such as soups, vegetables, and beans). Limit sugar  · Limit drinks and foods with added sugar. These include candy, desserts, and soda pop. Limit alcohol  · Limit alcohol to no more than 2 drinks a day for men and 1 drink a day for women. Too much alcohol can cause health problems. When should you call for help? Watch closely for changes in your health, and be sure to contact your doctor if:    · You would like help planning heart-healthy meals. Where can you learn more? Go to https://tess.[facility]. org and sign in to your [employer] account. Enter V137 in the Zzish box to learn more about \"Heart-Healthy Diet: Care Instructions. \"     If you do not have an account, please click on the \"Sign Up Now\" link. Current as of: April 9, 2019  Content Version: 12.3  © 3695-5097 [employer]wise, Incorporated. Care instructions adapted under license by [facility] ([facility]). If you have questions about a medical condition or this instruction, always ask your healthcare professional. [address] any warranty or liability for your use of this information.

## 2020-02-28 NOTE — PROGRESS NOTES
[facility] - Cardiology        Lelo Ball is a 80 y.o. 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 A repeat echocardiogram was completed 12/2019 revealing progression of valve disease to moderate. He has not been seen in office since 3/2016 and returns to the office today in follow-up. He states the repeat echocardiogram was completed due to a murmur being heard. He denies chest pain and his breathing has been well overall. Patient denies any symptoms of nausea, vomiting, near syncope, syncope, heart racing, palpitations, dizziness, lightheaded, PND, orthopnea, wheezing, diaphoresis, BLE edema, bilateral lower extremities pain, cramping or fatigue. He was previously playing basketball but will now use light free weights and walk. He only stopped playing basketball due to it being a little too \"rough\" now with abrasions and cuts. He denies any exertional symptoms. He denies any limitations in his activties now. Current Outpatient Medications   Medication Sig Dispense Refill    aspirin 81 MG tablet Take 81 mg by mouth daily Taking 2 tablets      Omega-3 Fatty Acids (FISH OIL PO) Take by mouth      Ascorbic Acid (VITAMIN C) 500 MG tablet Take 500 mg by mouth daily      sildenafil (REVATIO) 20 MG tablet Take 1 tablet by mouth as needed (1 or 2 tablets as needed for erections on empty stomach) 15 tablet 1    Vitamin D (CHOLECALCIFEROL) 1000 UNITS CAPS capsule Take 1,000 Units by mouth daily.  Multiple Vitamins-Minerals (MULTI COMPLETE PO) Take 1 tablet by mouth daily. No current facility-administered medications for this visit.           Allergies   Allergen Reactions    Eggs Or Egg-Derived Products Other (See Comments)     headaches       Physical Exam:  BP (!) 150/80   Pulse 65   Ht 5' 10\" (1.778 m)   Wt 161 lb (73 kg) Comment: WITH SHOES  SpO2 97%   BMI 23.10 kg/m²    Wt Readings from Last 2 Encounters:   02/28/20 161 lb (73 kg)   12/05/19 159 lb (72.1 kg)     General Appearance:  Alert, cooperative, no distress, appears stated age   Head:  Normocephalic, without obvious abnormality, atraumatic   Eyes:  PERRL, conjunctiva/corneas clear       Nose: Nares normal, no drainage or sinus tenderness   Throat: Lips, mucosa, and tongue normal   Neck: Supple, symmetrical, trachea midline, no adenopathy, thyroid: not enlarged, symmetric, no tenderness/mass/nodules, no carotid bruit or JVD       Lungs:   Clear to auscultation bilaterally, respirations unlabored   Chest Wall:  No tenderness or deformity   Heart:  Regular rate and rhythm, S1, S2 normal, 3/6 systolic ejection murmur with occasional ectopy, No rub or gallop   Abdomen:   Soft, non-tender, bowel sounds active all four quadrants,  no masses, no organomegaly   Extremities: Extremities normal, atraumatic, no cyanosis or edema   Pulses: Carotid      L   2      R2  Radial       L    2     R2     Skin: Skin color, texture, turgor normal, no rashes or lesions   Pysch: Normal mood and affect   Neurologic: Normal     Lab Results   Component Value Date    TRIG 61 06/26/2017    HDL 73 06/26/2017    LDLCALC 144 06/26/2017    LABVLDL 12 06/26/2017     Lab Results   Component Value Date     09/06/2019    K 4.2 11/05/2019    BUN 23 09/06/2019    CREATININE 1.2 10/18/2019     ECG 3/11/16: Sinus rhythm  ECG 2/28/20: Sinus rhythm        Imaging:     Carotid 3/15/13  The right internal carotid artery appears to have a 1-15% diameter reducing   stenosis based on velocity criteria. The left internal carotid artery appears to have a 1-15% diameter reducing   stenosis based on velocity criteria       Echo 4/23/14  Normal left ventricle size and systolic function with an estimated   Ejection fraction of 55%.   There is reversal of E/A inflow velocities across the mitral valve  suggesting impaired left ventricular relaxation. The mitral valve leaflets are slightly thickened with normal leaflet  mobility. Mitral valve prolapse is present. Mild to Moderate mitral regurgitation with an eccentrically directed jet. Normal right ventricular size and function. The aortic valve is thickened/calcified with decreased leaflet mobility. The aortic valve area is calculated at 1.3 cm2 with a mean pressure   Gradient of 12 mmHg. This is c/w mild aortic stenosis. Echo 12/7/19  Ejection fraction is visually estimated to be 60-65%. There is asymmetric hypertrophy of the septum. Grade I diastolic dysfunction with normal LV filling pressures. There is bileaflet prolapse of mitral valve. Moderate mitral regurgitation. The left atrium is severely dilated. The aortic valve is thickened/calcified with decreased leaflet mobility  consistent with moderate aortic stenosis. The right atrium is severely dilated. Assessment:  1. Aortic Stenosis, nonrheumatic, moderate  2. Mitral Regurgitation, moderate    Plan:  I think that Mr. Zelalem Winter  is entirely stable from a cardiovascular standpoint. I see no need to make any changes currently in his medical regimen. His valve disease has progressed to moderate but he is currently asymptomatic. I will have him complete an echocardiogram in 1 year to evaluate for any progression of disease. I will see him in the office for follow up in 1 year. This note was scribed in the presence of Gilma He MD by General Youngblood, RN. Physician Attestation:  The scribes documentation has been prepared under my direction and personally reviewed by me in its entirety. I, Dr. Polina Murphy personally performed the services described in this documentation as scribed by my RN,  [employer] in my presence, and I confirm that the note above accurately reflects all work, treatment, procedures, and medical decision making performed by me.

## 2020-03-05 ENCOUNTER — OFFICE VISIT (OUTPATIENT)
Dept: FAMILY MEDICINE CLINIC | Age: 85
End: 2020-03-05
Payer: MEDICARE

## 2020-03-05 VITALS
SYSTOLIC BLOOD PRESSURE: 130 MMHG | TEMPERATURE: 97.3 F | DIASTOLIC BLOOD PRESSURE: 80 MMHG | WEIGHT: 166 LBS | HEIGHT: 70 IN | BODY MASS INDEX: 23.77 KG/M2

## 2020-03-05 PROCEDURE — G8482 FLU IMMUNIZE ORDER/ADMIN: HCPCS | Performed by: FAMILY MEDICINE

## 2020-03-05 PROCEDURE — G8420 CALC BMI NORM PARAMETERS: HCPCS | Performed by: FAMILY MEDICINE

## 2020-03-05 PROCEDURE — G8427 DOCREV CUR MEDS BY ELIG CLIN: HCPCS | Performed by: FAMILY MEDICINE

## 2020-03-05 PROCEDURE — 4040F PNEUMOC VAC/ADMIN/RCVD: CPT | Performed by: FAMILY MEDICINE

## 2020-03-05 PROCEDURE — 1123F ACP DISCUSS/DSCN MKR DOCD: CPT | Performed by: FAMILY MEDICINE

## 2020-03-05 PROCEDURE — 1036F TOBACCO NON-USER: CPT | Performed by: FAMILY MEDICINE

## 2020-03-05 PROCEDURE — 99213 OFFICE O/P EST LOW 20 MIN: CPT | Performed by: FAMILY MEDICINE

## 2020-03-05 ASSESSMENT — ENCOUNTER SYMPTOMS
SHORTNESS OF BREATH: 0
ABDOMINAL PAIN: 0
BLOOD IN STOOL: 0
DIARRHEA: 0
NAUSEA: 0
VOMITING: 0

## 2020-03-05 ASSESSMENT — PATIENT HEALTH QUESTIONNAIRE - PHQ9
SUM OF ALL RESPONSES TO PHQ QUESTIONS 1-9: 0
SUM OF ALL RESPONSES TO PHQ9 QUESTIONS 1 & 2: 0
SUM OF ALL RESPONSES TO PHQ QUESTIONS 1-9: 0
2. FEELING DOWN, DEPRESSED OR HOPELESS: 0
1. LITTLE INTEREST OR PLEASURE IN DOING THINGS: 0

## 2020-03-05 NOTE — PROGRESS NOTES
Subjective:      Patient ID: Devorah Dorantes is a 80 y.o. male. Patient presents with:  3 Month Follow-Up: weight check    Here for the above  Neg ct chest abdoomen 10/25/19 all fine  Recent blood ok  Hx of follicular lymphoma low grade  He see's us now for monitor  Did see heme onc last year  Colon nml 2009    appetite is good  No c/o pain  He is having no issues but does have constipation  Uses otc. See last visit as this is not new and old and wife had confirmed same  meds the same  He saw dr Tyrone Renee for a skin check up and seeing some one in [place] now  For the skin      YOB: 1931    Date of Visit:  3/5/2020     -- Eggs Or Egg-Derived Products -- Other (See Comments)    --  headaches    Current Outpatient Medications:  aspirin 81 MG tablet, Take 81 mg by mouth daily Taking 2 tablets, Disp: , Rfl:   Omega-3 Fatty Acids (FISH OIL PO), Take by mouth, Disp: , Rfl:   Ascorbic Acid (VITAMIN C) 500 MG tablet, Take 500 mg by mouth daily, Disp: , Rfl:   sildenafil (REVATIO) 20 MG tablet, Take 1 tablet by mouth as needed (1 or 2 tablets as needed for erections on empty stomach), Disp: 15 tablet, Rfl: 1  Vitamin D (CHOLECALCIFEROL) 1000 UNITS CAPS capsule, Take 1,000 Units by mouth daily. , Disp: , Rfl:   Multiple Vitamins-Minerals (MULTI COMPLETE PO), Take 1 tablet by mouth daily. , Disp: , Rfl:     No current facility-administered medications for this visit.       ---------------------------               03/05/20                      0825         ---------------------------   BP:          130/80         Site:    Left Upper Arm     Position:     Sitting        Cuff Size:  Medium Adult      Temp:   97.3 °F (36.3 °C)   TempSrc:      Oral          Weight: 166 lb (75.3 kg)    Height: 5' 10\" (1.778 m)   ---------------------------  Body mass index is 23.82 kg/m².      Wt Readings from Last 3 Encounters:  03/05/20 : 166 lb (75.3 kg)  02/28/20 : 161 lb (73 kg)  12/05/19 : 159

## 2020-10-01 ENCOUNTER — OFFICE VISIT (OUTPATIENT)
Dept: FAMILY MEDICINE CLINIC | Age: 85
End: 2020-10-01
Payer: MEDICARE

## 2020-10-01 VITALS
DIASTOLIC BLOOD PRESSURE: 80 MMHG | HEIGHT: 70 IN | WEIGHT: 156 LBS | HEART RATE: 76 BPM | BODY MASS INDEX: 22.33 KG/M2 | TEMPERATURE: 97.1 F | SYSTOLIC BLOOD PRESSURE: 126 MMHG

## 2020-10-01 DIAGNOSIS — E78.2 MIXED HYPERLIPIDEMIA: ICD-10-CM

## 2020-10-01 DIAGNOSIS — C82.01 FOLLICULAR LYMPHOMA GRADE I, LYMPH NODES OF HEAD, FACE, AND NECK (HCC): ICD-10-CM

## 2020-10-01 LAB
A/G RATIO: 1.9 (ref 1.1–2.2)
ALBUMIN SERPL-MCNC: 4.1 G/DL (ref 3.4–5)
ALP BLD-CCNC: 60 U/L (ref 40–129)
ALT SERPL-CCNC: 18 U/L (ref 10–40)
ANION GAP SERPL CALCULATED.3IONS-SCNC: 9 MMOL/L (ref 3–16)
AST SERPL-CCNC: 39 U/L (ref 15–37)
BASOPHILS ABSOLUTE: 0 K/UL (ref 0–0.2)
BASOPHILS RELATIVE PERCENT: 0.7 %
BILIRUB SERPL-MCNC: 0.6 MG/DL (ref 0–1)
BILIRUBIN URINE: NEGATIVE
BLOOD, URINE: NEGATIVE
BUN BLDV-MCNC: 27 MG/DL (ref 7–20)
CALCIUM SERPL-MCNC: 9.1 MG/DL (ref 8.3–10.6)
CHLORIDE BLD-SCNC: 105 MMOL/L (ref 99–110)
CHOLESTEROL, TOTAL: 188 MG/DL (ref 0–199)
CLARITY: CLEAR
CO2: 26 MMOL/L (ref 21–32)
COLOR: YELLOW
CREAT SERPL-MCNC: 1.3 MG/DL (ref 0.8–1.3)
EOSINOPHILS ABSOLUTE: 0.1 K/UL (ref 0–0.6)
EOSINOPHILS RELATIVE PERCENT: 3.3 %
EPITHELIAL CELLS, UA: 0 /HPF (ref 0–5)
GFR AFRICAN AMERICAN: >60
GFR NON-AFRICAN AMERICAN: 52
GLOBULIN: 2.2 G/DL
GLUCOSE BLD-MCNC: 88 MG/DL (ref 70–99)
GLUCOSE URINE: NEGATIVE MG/DL
HCT VFR BLD CALC: 37.6 % (ref 40.5–52.5)
HDLC SERPL-MCNC: 66 MG/DL (ref 40–60)
HEMOGLOBIN: 12.7 G/DL (ref 13.5–17.5)
HYALINE CASTS: 0 /LPF (ref 0–8)
KETONES, URINE: NEGATIVE MG/DL
LDL CHOLESTEROL CALCULATED: 111 MG/DL
LEUKOCYTE ESTERASE, URINE: NEGATIVE
LYMPHOCYTES ABSOLUTE: 0.4 K/UL (ref 1–5.1)
LYMPHOCYTES RELATIVE PERCENT: 9.6 %
MCH RBC QN AUTO: 33.2 PG (ref 26–34)
MCHC RBC AUTO-ENTMCNC: 33.7 G/DL (ref 31–36)
MCV RBC AUTO: 98.6 FL (ref 80–100)
MICROSCOPIC EXAMINATION: NORMAL
MONOCYTES ABSOLUTE: 0.4 K/UL (ref 0–1.3)
MONOCYTES RELATIVE PERCENT: 10.2 %
NEUTROPHILS ABSOLUTE: 3.4 K/UL (ref 1.7–7.7)
NEUTROPHILS RELATIVE PERCENT: 76.2 %
NITRITE, URINE: NEGATIVE
PDW BLD-RTO: 13.4 % (ref 12.4–15.4)
PH UA: 6 (ref 5–8)
PLATELET # BLD: 179 K/UL (ref 135–450)
PMV BLD AUTO: 7.7 FL (ref 5–10.5)
POTASSIUM SERPL-SCNC: 5 MMOL/L (ref 3.5–5.1)
PROTEIN UA: NEGATIVE MG/DL
RBC # BLD: 3.81 M/UL (ref 4.2–5.9)
RBC UA: 1 /HPF (ref 0–4)
SODIUM BLD-SCNC: 140 MMOL/L (ref 136–145)
SPECIFIC GRAVITY UA: 1.02 (ref 1–1.03)
TOTAL PROTEIN: 6.3 G/DL (ref 6.4–8.2)
TRIGL SERPL-MCNC: 56 MG/DL (ref 0–150)
URINE TYPE: NORMAL
UROBILINOGEN, URINE: 0.2 E.U./DL
VLDLC SERPL CALC-MCNC: 11 MG/DL
WBC # BLD: 4.4 K/UL (ref 4–11)
WBC UA: 0 /HPF (ref 0–5)

## 2020-10-01 PROCEDURE — 99213 OFFICE O/P EST LOW 20 MIN: CPT | Performed by: FAMILY MEDICINE

## 2020-10-01 PROCEDURE — G8427 DOCREV CUR MEDS BY ELIG CLIN: HCPCS | Performed by: FAMILY MEDICINE

## 2020-10-01 PROCEDURE — G8484 FLU IMMUNIZE NO ADMIN: HCPCS | Performed by: FAMILY MEDICINE

## 2020-10-01 PROCEDURE — 1036F TOBACCO NON-USER: CPT | Performed by: FAMILY MEDICINE

## 2020-10-01 PROCEDURE — G0008 ADMIN INFLUENZA VIRUS VAC: HCPCS | Performed by: FAMILY MEDICINE

## 2020-10-01 PROCEDURE — 4040F PNEUMOC VAC/ADMIN/RCVD: CPT | Performed by: FAMILY MEDICINE

## 2020-10-01 PROCEDURE — 90694 VACC AIIV4 NO PRSRV 0.5ML IM: CPT | Performed by: FAMILY MEDICINE

## 2020-10-01 PROCEDURE — 1123F ACP DISCUSS/DSCN MKR DOCD: CPT | Performed by: FAMILY MEDICINE

## 2020-10-01 PROCEDURE — 3288F FALL RISK ASSESSMENT DOCD: CPT | Performed by: FAMILY MEDICINE

## 2020-10-01 PROCEDURE — G8420 CALC BMI NORM PARAMETERS: HCPCS | Performed by: FAMILY MEDICINE

## 2020-10-01 NOTE — PROGRESS NOTES
Subjective:      Patient ID: Stephanie Alonso is a 80 y.o. male. Patient presents with:  6 Month Follow-Up: pt is fasting    He is well overall  He does note some right foot bunion and toe deformity that has developed over the years  He has some discomfort to walk on the ball of the foot    He no longer needs to see heme onc    Appetite is good no fever no cold sx  No cp no sob no ha no dizzy  bm ok no blood at times constipation and has had over the years  Urine is passing well no pain no blood  Has not been around anyone who is ill  At the location of the prior hernia he has at times some discomfort on the right abd for about 6+ month    YOB: 1931    Date of Visit:  10/1/2020     -- Eggs Or Egg-Derived Products -- Other (See Comments)    --  headaches    Current Outpatient Medications:  aspirin 81 MG tablet, Take 81 mg by mouth daily Taking 2 tablets, Disp: , Rfl:   Omega-3 Fatty Acids (FISH OIL PO), Take by mouth, Disp: , Rfl:   Ascorbic Acid (VITAMIN C) 500 MG tablet, Take 500 mg by mouth daily, Disp: , Rfl:   sildenafil (REVATIO) 20 MG tablet, Take 1 tablet by mouth as needed (1 or 2 tablets as needed for erections on empty stomach), Disp: 15 tablet, Rfl: 1  Vitamin D (CHOLECALCIFEROL) 1000 UNITS CAPS capsule, Take 1,000 Units by mouth daily. , Disp: , Rfl:   Multiple Vitamins-Minerals (MULTI COMPLETE PO), Take 1 tablet by mouth daily. , Disp: , Rfl:     No current facility-administered medications for this visit.       ---------------------------               10/01/20                      0812         ---------------------------   BP:          126/80         Site:    Left Upper Arm     Position:     Sitting        Cuff Size:  Medium Adult      Pulse:         76           Temp:   97.1 °F (36.2 °C)   TempSrc:    Temporal        Weight: 156 lb (70.8 kg)    Height: 5' 10\" (1.778 m)   ---------------------------  Body mass index is 22.38 kg/m².      Wt Readings from Last 3 Encounters:  10/01/20 : 156 lb (70.8 kg)  03/05/20 : 166 lb (75.3 kg)  02/28/20 : 161 lb (73 kg)    BP Readings from Last 3 Encounters:  10/01/20 : 126/80  03/05/20 : 130/80  02/28/20 : (!) 150/80        Review of Systems    Objective:   Physical Exam  Constitutional:       General: He is not in acute distress. Appearance: Normal appearance. He is well-developed. He is not ill-appearing or diaphoretic. HENT:      Head: Normocephalic and atraumatic. Eyes:      General: No scleral icterus. Neck:      Musculoskeletal: Neck supple. Thyroid: No thyroid mass or thyromegaly. Cardiovascular:      Rate and Rhythm: Normal rate and regular rhythm. Pulses:           Dorsalis pedis pulses are 2+ on the right side. Posterior tibial pulses are 2+ on the right side. Heart sounds: Murmur present. Systolic murmur present with a grade of 2/6. No friction rub. No gallop. Comments:     Pulmonary:      Effort: Pulmonary effort is normal. No tachypnea, accessory muscle usage or respiratory distress. Breath sounds: Normal breath sounds. No decreased breath sounds, wheezing, rhonchi or rales. Abdominal:      General: Bowel sounds are normal. There is no distension or abdominal bruit. Palpations: Abdomen is soft. There is no hepatomegaly, splenomegaly, mass or pulsatile mass. Tenderness: There is no abdominal tenderness. There is no guarding. Musculoskeletal:      Comments: Right foot warm pink dry no lesions no edema  He has callous ball of foot under the great MTP joint. The toes are uniformly curled under      Lymphadenopathy:      Head:      Right side of head: No submental, submandibular, preauricular or posterior auricular adenopathy. Left side of head: No submental, submandibular, preauricular or posterior auricular adenopathy. Cervical: No cervical adenopathy. Upper Body:      Right upper body: No supraclavicular adenopathy.       Left upper body: No supraclavicular adenopathy. Skin:     General: Skin is warm and dry. Coloration: Skin is not pale. Nails: There is no clubbing. Neurological:      General: No focal deficit present. Mental Status: He is alert. Assessment:       Diagnosis Orders   1. Right foot pain     2. Follicular lymphoma grade i, lymph nodes of head, face, and neck (HCC)  Lipid Panel    Comprehensive Metabolic Panel    CBC Auto Differential    Urinalysis with Microscopic   3. Mixed hyperlipidemia  Lipid Panel    Comprehensive Metabolic Panel   4.  Needs flu shot             Plan:      Continue the same for now  See me in January for a check up  See dr Nallely Chavez the blood today         Dorian Mohamud MD

## 2021-03-04 ENCOUNTER — OFFICE VISIT (OUTPATIENT)
Dept: CARDIOLOGY CLINIC | Age: 86
End: 2021-03-04
Payer: MEDICARE

## 2021-03-04 VITALS
SYSTOLIC BLOOD PRESSURE: 136 MMHG | TEMPERATURE: 97.5 F | HEIGHT: 70 IN | WEIGHT: 164 LBS | OXYGEN SATURATION: 98 % | HEART RATE: 64 BPM | DIASTOLIC BLOOD PRESSURE: 82 MMHG | BODY MASS INDEX: 23.48 KG/M2

## 2021-03-04 DIAGNOSIS — I35.0 NONRHEUMATIC AORTIC VALVE STENOSIS: Primary | ICD-10-CM

## 2021-03-04 DIAGNOSIS — I34.0 NONRHEUMATIC MITRAL VALVE REGURGITATION: ICD-10-CM

## 2021-03-04 PROCEDURE — G8420 CALC BMI NORM PARAMETERS: HCPCS | Performed by: INTERNAL MEDICINE

## 2021-03-04 PROCEDURE — 99214 OFFICE O/P EST MOD 30 MIN: CPT | Performed by: INTERNAL MEDICINE

## 2021-03-04 PROCEDURE — 1123F ACP DISCUSS/DSCN MKR DOCD: CPT | Performed by: INTERNAL MEDICINE

## 2021-03-04 PROCEDURE — 1036F TOBACCO NON-USER: CPT | Performed by: INTERNAL MEDICINE

## 2021-03-04 PROCEDURE — 93000 ELECTROCARDIOGRAM COMPLETE: CPT | Performed by: INTERNAL MEDICINE

## 2021-03-04 PROCEDURE — G8427 DOCREV CUR MEDS BY ELIG CLIN: HCPCS | Performed by: INTERNAL MEDICINE

## 2021-03-04 PROCEDURE — 4040F PNEUMOC VAC/ADMIN/RCVD: CPT | Performed by: INTERNAL MEDICINE

## 2021-03-04 PROCEDURE — G8484 FLU IMMUNIZE NO ADMIN: HCPCS | Performed by: INTERNAL MEDICINE

## 2021-03-04 NOTE — PROGRESS NOTES
[address] [facility] - Cardiology    CC: \"I feel fine\"     HPI:   Hammad Cerda is a 80 y.o. 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 A repeat echocardiogram was completed 12/2019 revealing progression of valve disease to moderate. He has not been seen in office since 3/2016 and returns to the office today in follow-up. Today, he reports feeling well overall. He denies chest pain with rest or exertion. His breathing has been comfortable without shortness of breath. Patient denies exertional chest pain/pressure, dyspnea at rest, CASON, PND, orthopnea, palpitations, lightheadedness, weight changes, changes in LE edema, and syncope. He reports medication compliance and is tolerating. Review of Systems:  Constitutional: No fatigue, weakness, night sweats or fever. HEENT: No new vision difficulties or ringing in the ears. Respiratory: No new SOB, PND, orthopnea or cough. Cardiovascular: See HPI   GI: No n/v, diarrhea, constipation, abdominal pain or changes in bowel habits. No melena, no hematochezia  : No urinary frequency, urgency, incontinence, hematuria or dysuria. Skin: No cyanosis or skin lesions. Musculoskeletal: No new muscle or joint pain. Neurological: No syncope or TIA-like symptoms. Psychiatric: No anxiety, insomnia or depression     Current Outpatient Medications   Medication Sig Dispense Refill    aspirin 81 MG tablet Take 81 mg by mouth daily Takes once a week      Ascorbic Acid (VITAMIN C) 500 MG tablet Take 500 mg by mouth daily      Vitamin D (CHOLECALCIFEROL) 1000 UNITS CAPS capsule Take 1,000 Units by mouth daily.  Multiple Vitamins-Minerals (MULTI COMPLETE PO) Take 1 tablet by mouth daily.       sildenafil (REVATIO) 20 MG tablet Take 1 tablet by mouth as needed (1 or 2 tablets as needed for erections on empty stomach) 15 tablet 1     No current facility-administered medications for this visit. Allergies   Allergen Reactions    Eggs Or Egg-Derived Products Other (See Comments)     headaches       Physical Exam:  /82   Pulse 64   Temp 97.5 °F (36.4 °C)   Ht 5' 10\" (1.778 m)   Wt 164 lb (74.4 kg)   SpO2 98%   BMI 23.53 kg/m²    Wt Readings from Last 2 Encounters:   03/04/21 164 lb (74.4 kg)   10/01/20 156 lb (70.8 kg)     General Appearance:  Alert, cooperative, no distress, appears stated age   Head:  Normocephalic, without obvious abnormality, atraumatic   Eyes:  PERRL, conjunctiva/corneas clear       Nose: Nares normal, no drainage or sinus tenderness   Throat: Lips, mucosa, and tongue normal   Neck: Supple, symmetrical, trachea midline, no adenopathy, thyroid: not enlarged, symmetric, no tenderness/mass/nodules, no carotid bruit or JVD       Lungs:   Clear to auscultation bilaterally, respirations unlabored   Chest Wall:  No tenderness or deformity   Heart:  Regular rate and rhythm, S1, S2 normal, 3/6 systolic ejection murmur with occasional ectopy, No rub or gallop   Abdomen:   Soft, non-tender, bowel sounds active all four quadrants,  no masses, no organomegaly   Extremities: Extremities normal, atraumatic, no cyanosis or edema   Pulses: Carotid      L   2      R2  Radial       L    2     R2     Skin: Skin color, texture, turgor normal, no rashes or lesions   Pysch: Normal mood and affect   Neurologic: Normal     Lab Results   Component Value Date    TRIG 56 10/01/2020    HDL 66 10/01/2020    LDLCALC 111 10/01/2020    LABVLDL 11 10/01/2020     Lab Results   Component Value Date     10/01/2020    K 5.0 10/01/2020    BUN 27 10/01/2020    CREATININE 1.3 10/01/2020     ECG 3/11/16: Sinus rhythm  ECG 2/28/20: Sinus rhythm  ECG 3/4/21: Sinus rhythm       Imaging:     Carotid 3/15/13  The right internal carotid artery appears to have a 1-15% diameter reducing   stenosis based on velocity criteria.       The left internal carotid artery appears to have a 1-15% diameter reducing   stenosis based on velocity criteria       Echo 4/23/14  Normal left ventricle size and systolic function with an estimated   Ejection fraction of 55%. There is reversal of E/A inflow velocities across the mitral valve  suggesting impaired left ventricular relaxation. The mitral valve leaflets are slightly thickened with normal leaflet  mobility. Mitral valve prolapse is present. Mild to Moderate mitral regurgitation with an eccentrically directed jet. Normal right ventricular size and function. The aortic valve is thickened/calcified with decreased leaflet mobility. The aortic valve area is calculated at 1.3 cm2 with a mean pressure   Gradient of 12 mmHg. This is c/w mild aortic stenosis. Echo 12/7/19  Ejection fraction is visually estimated to be 60-65%. There is asymmetric hypertrophy of the septum. Grade I diastolic dysfunction with normal LV filling pressures. There is bileaflet prolapse of mitral valve. Moderate mitral regurgitation. The left atrium is severely dilated. The aortic valve is thickened/calcified with decreased leaflet mobility  consistent with moderate aortic stenosis. The right atrium is severely dilated. Assessment:  1. Aortic Stenosis, nonrheumatic, moderate  2. Mitral Regurgitation, moderate    Plan:  I think that Mr. Mian Lanza  is entirely stable from a cardiovascular standpoint. I see no need to make any changes currently in his medical regimen. He denies any cardiac symptoms today in the office. His blood pressure is well controlled. I will have him complete an echocardiogram as previously ordered to assess for progression of his valve disease. I will see him in the office for follow up in 1 year. This note was scribed in the presence of Jose Wang MD by [employer], RN.   Physician Attestation:  The scribes documentation has been prepared under my direction and personally reviewed by me in its entirety. I, Dr. Kady Lema personally performed the services described in this documentation as scribed by my RN in my presence, and I confirm that the note above accurately reflects all work, treatment, procedures, and medical decision making performed by me.

## 2021-04-01 ENCOUNTER — OFFICE VISIT (OUTPATIENT)
Dept: FAMILY MEDICINE CLINIC | Age: 86
End: 2021-04-01
Payer: MEDICARE

## 2021-04-01 VITALS
HEART RATE: 64 BPM | SYSTOLIC BLOOD PRESSURE: 138 MMHG | TEMPERATURE: 97.2 F | HEIGHT: 70 IN | BODY MASS INDEX: 22.76 KG/M2 | DIASTOLIC BLOOD PRESSURE: 84 MMHG | WEIGHT: 159 LBS

## 2021-04-01 DIAGNOSIS — E78.2 MIXED HYPERLIPIDEMIA: Primary | ICD-10-CM

## 2021-04-01 DIAGNOSIS — Z85.79 H/O LYMPHOMA: ICD-10-CM

## 2021-04-01 PROCEDURE — 1036F TOBACCO NON-USER: CPT | Performed by: FAMILY MEDICINE

## 2021-04-01 PROCEDURE — 1123F ACP DISCUSS/DSCN MKR DOCD: CPT | Performed by: FAMILY MEDICINE

## 2021-04-01 PROCEDURE — G8420 CALC BMI NORM PARAMETERS: HCPCS | Performed by: FAMILY MEDICINE

## 2021-04-01 PROCEDURE — 99213 OFFICE O/P EST LOW 20 MIN: CPT | Performed by: FAMILY MEDICINE

## 2021-04-01 PROCEDURE — G8427 DOCREV CUR MEDS BY ELIG CLIN: HCPCS | Performed by: FAMILY MEDICINE

## 2021-04-01 PROCEDURE — 4040F PNEUMOC VAC/ADMIN/RCVD: CPT | Performed by: FAMILY MEDICINE

## 2021-04-01 ASSESSMENT — PATIENT HEALTH QUESTIONNAIRE - PHQ9
1. LITTLE INTEREST OR PLEASURE IN DOING THINGS: 0
SUM OF ALL RESPONSES TO PHQ QUESTIONS 1-9: 0
2. FEELING DOWN, DEPRESSED OR HOPELESS: 0
SUM OF ALL RESPONSES TO PHQ QUESTIONS 1-9: 0

## 2021-04-01 ASSESSMENT — ENCOUNTER SYMPTOMS
DIARRHEA: 0
SHORTNESS OF BREATH: 0
BLOOD IN STOOL: 0
ABDOMINAL PAIN: 0
NAUSEA: 0

## 2021-04-01 NOTE — PROGRESS NOTES
Subjective:      Patient ID: Hussein Jones is a 80 y.o. male. Chief Complaint   Patient presents with    Check-Up     lipids - pt is not fasting        Patient presents with:  Check-Up: lipids - pt is not fasting    He is well and really no c/o  He will be 89 shortly    No meds    Family is well  Due for the 2nd covid vaccine in a week or so    YOB: 1931    Date of Visit:  4/1/2021     -- Eggs Or Egg-Derived Products -- Other (See Comments)    --  headaches    Current Outpatient Medications:  aspirin 81 MG tablet, Take 81 mg by mouth daily Takes once a week, Disp: , Rfl:   Ascorbic Acid (VITAMIN C) 500 MG tablet, Take 500 mg by mouth daily, Disp: , Rfl:   sildenafil (REVATIO) 20 MG tablet, Take 1 tablet by mouth as needed (1 or 2 tablets as needed for erections on empty stomach), Disp: 15 tablet, Rfl: 1  Vitamin D (CHOLECALCIFEROL) 1000 UNITS CAPS capsule, Take 1,000 Units by mouth daily. , Disp: , Rfl:   Multiple Vitamins-Minerals (MULTI COMPLETE PO), Take 1 tablet by mouth daily. , Disp: , Rfl:     No current facility-administered medications for this visit.       ---------------------------               04/01/21                      1008         ---------------------------   BP:          138/84         Site:    Left Upper Arm     Position:     Sitting        Cuff Size:  Medium Adult      Pulse:         64           Temp:   97.2 °F (36.2 °C)   TempSrc:    Temporal        Weight: 159 lb (72.1 kg)    Height: 5' 10\" (1.778 m)   ---------------------------  Body mass index is 22.81 kg/m². Wt Readings from Last 3 Encounters:  04/01/21 : 159 lb (72.1 kg)  03/04/21 : 164 lb (74.4 kg)  10/01/20 : 156 lb (70.8 kg)    BP Readings from Last 3 Encounters:  04/01/21 : 138/84  03/04/21 : 136/82  10/01/20 : 126/80        Review of Systems   Constitutional: Negative for appetite change, chills, fever and unexpected weight change.    Respiratory: Negative for shortness of breath. Cardiovascular: Negative for chest pain. Gastrointestinal: Negative for abdominal pain, blood in stool, diarrhea and nausea. Genitourinary: Negative for difficulty urinating. Neurological: Negative for headaches. Objective:   Physical Exam  Constitutional:       General: He is not in acute distress. Appearance: Normal appearance. He is well-developed. He is not ill-appearing or diaphoretic. Cardiovascular:      Rate and Rhythm: Normal rate and regular rhythm. Heart sounds: Murmur present. Systolic murmur present with a grade of 2/6. No friction rub. No gallop. Comments:     Pulmonary:      Effort: Pulmonary effort is normal. No tachypnea, accessory muscle usage or respiratory distress. Breath sounds: Normal breath sounds. No decreased breath sounds, wheezing, rhonchi or rales. Abdominal:      General: Bowel sounds are normal. There is no distension or abdominal bruit. Palpations: Abdomen is soft. There is no hepatomegaly, splenomegaly, mass or pulsatile mass. Tenderness: There is no abdominal tenderness. There is no guarding. Lymphadenopathy:      Cervical: No cervical adenopathy. Upper Body:      Right upper body: No supraclavicular adenopathy. Left upper body: No supraclavicular adenopathy. Skin:     General: Skin is warm and dry. Coloration: Skin is not pale. Nails: There is no clubbing. Neurological:      Mental Status: He is alert. Assessment:        Diagnosis Orders   1. Mixed hyperlipidemia     2.  H/O lymphoma         We monitor for his hx of follicular lymphoma grade one  He has not seen heme onc since 2019   Reviewed the recent cardiac note on 3/4 for the Aov stenosis and is stable and will see in a year  Blood last visit in October stable      Plan:      Continue the same  Stay with the low fat diet  See us in 6 months         Phoenix Shrestha MD

## 2021-04-05 ENCOUNTER — HOSPITAL ENCOUNTER (OUTPATIENT)
Dept: NON INVASIVE DIAGNOSTICS | Age: 86
Discharge: HOME | End: 2021-04-05
Payer: MEDICARE

## 2021-04-05 DIAGNOSIS — I35.0 NONRHEUMATIC AORTIC VALVE STENOSIS: ICD-10-CM

## 2021-04-05 DIAGNOSIS — I34.0 NONRHEUMATIC MITRAL VALVE REGURGITATION: ICD-10-CM

## 2021-04-05 LAB
LV EF: 53 %
LVEF MODALITY: NORMAL

## 2021-04-05 PROCEDURE — 93306 TTE W/DOPPLER COMPLETE: CPT

## 2021-07-22 ENCOUNTER — HOSPITAL ENCOUNTER (EMERGENCY)
Age: 86
Discharge: HOME | End: 2021-07-22
Attending: EMERGENCY MEDICINE
Payer: MEDICARE

## 2021-07-22 VITALS
SYSTOLIC BLOOD PRESSURE: 140 MMHG | TEMPERATURE: 97.9 F | DIASTOLIC BLOOD PRESSURE: 97 MMHG | HEART RATE: 76 BPM | OXYGEN SATURATION: 97 % | RESPIRATION RATE: 20 BRPM | BODY MASS INDEX: 23.62 KG/M2 | WEIGHT: 165 LBS | HEIGHT: 70 IN

## 2021-07-22 DIAGNOSIS — R04.0 EPISTAXIS: Primary | ICD-10-CM

## 2021-07-22 PROCEDURE — 6370000000 HC RX 637 (ALT 250 FOR IP): Performed by: EMERGENCY MEDICINE

## 2021-07-22 PROCEDURE — 99284 EMERGENCY DEPT VISIT MOD MDM: CPT

## 2021-07-22 RX ORDER — OXYMETAZOLINE HYDROCHLORIDE 0.05 G/100ML
2 SPRAY NASAL ONCE
Status: COMPLETED | OUTPATIENT
Start: 2021-07-22 | End: 2021-07-22

## 2021-07-22 RX ADMIN — OXYMETAZOLINE HCL 2 SPRAY: 0.05 SPRAY NASAL at 21:16

## 2021-07-22 ASSESSMENT — ENCOUNTER SYMPTOMS
FACIAL SWELLING: 0
CHOKING: 0
COUGH: 0

## 2021-07-23 NOTE — ED NOTES
Discharge instructions reviewed. Patient verbalized understanding. Patient discharge per meena.   No bleeding from [address], RN  07/22/21 [address], [address], RN  07/22/21 8137

## 2021-07-23 NOTE — ED PROVIDER NOTES
[facility]  EMERGENCY DEPARTMENTENCOUNTER      Pt Name: Sukhi Winters  MRN: [id number]  [place] 4/12/1931  Date ofevaluation: 7/22/2021  Provider: Mary Tena MD    CHIEF COMPLAINT       Chief Complaint   Patient presents with    Epistaxis     started 45 mins ago         HISTORY OF PRESENT ILLNESS   (Location/Symptom, Timing/Onset,Context/Setting, Quality, Duration, Modifying Factors, Severity)  Note limiting factors. Sukhi Winters is a 80 y.o. male  who  has a past medical history of Cancer ([address] GERD (gastroesophageal reflux disease), Heart murmur, and Wears glasses. who presents to the emergency department for evaluation of epistasis. Patient reports an acute onset of epistasis that occurred approximate 5 to 5 minutes prior to arrival while he was in the shower. He denies facial pain. Denies a history of recent trauma. Denies a history of recurrent epistasis. He is not currently on anticoagulation. On presentation bleeding is controlled. HPI    NursingNotes were reviewed. REVIEW OF SYSTEMS    (2-9 systems for level 4, 10 or more for level 5)     Review of Systems   Constitutional: Negative for fever. HENT: Positive for nosebleeds. Negative for facial swelling. Respiratory: Negative for cough and choking. Neurological: Negative for headaches. Except as noted above the remainder of the review of systems was reviewed and negative.        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    GERD (gastroesophageal reflux disease)     mild    Heart murmur     Wears glasses     reading         SURGICALHISTORY       Past Surgical History:   Procedure Laterality Date    ANKLE SURGERY Right 1985    COLONOSCOPY  6/4/2009    ok, dr Tanisha Long, COLON, DIAGNOSTIC  06/04/2009    hiatal hernia dr Gideon Chacon N/A 10/13/2017    TONSILLECTOMY AND ADENOIDECTOMY           CURRENT MEDICATIONS       Previous Medications    ASCORBIC ACID (VITAMIN C) 500 MG TABLET    Take 500 mg by mouth daily    ASPIRIN 81 MG TABLET    Take 81 mg by mouth daily Takes once a week    MULTIPLE VITAMINS-MINERALS (MULTI COMPLETE PO)    Take 1 tablet by mouth daily. VITAMIN D (CHOLECALCIFEROL) 1000 UNITS CAPS CAPSULE    Take 1,000 Units by mouth daily. Eggs or egg-derived products    FAMILY HISTORY       Family History   Problem Relation Age of Onset    Arthritis Mother     Heart Disease Father         pacemaker          SOCIAL HISTORY       Social History     Socioeconomic History    Marital status:      Spouse name: None    Number of children: 2    Years of education: None    Highest education level: None   Occupational History    Occupation: Retired   Tobacco Use    Smoking status: Never Smoker    Smokeless tobacco: Never Used   Vaping Use    Vaping Use: Never assessed   Substance and Sexual Activity    Alcohol use: Yes     Comment: minimal beer. Pt active plays basketball once a week    Drug use: No    Sexual activity: Never   Other Topics Concern    None   Social History Narrative    None     Social Determinants of Health     Financial Resource Strain:     Difficulty of Paying Living Expenses:    Food Insecurity:     Worried About Running Out of Food in the Last Year:     [address] in the Last Year:    Transportation Needs:     Lack of Transportation (Medical):      Lack of Transportation (Non-Medical):    Physical Activity:     Days of Exercise per Week:     Minutes of Exercise per Session:    Stress:     Feeling of Stress :    Social Connections:     Frequency of Communication with Friends and Family:     Frequency of Social Gatherings with Friends and Family:     Attends [employer] Services:     Active Member of Clubs or Organizations:     Attends Club or Organization Meetings:     Marital Status: - No data to display    All other labs were within normal range or not returned as of this dictation. EMERGENCY DEPARTMENT COURSE and DIFFERENTIAL DIAGNOSIS/MDM:   Vitals:    Vitals:    07/22/21 2103 07/22/21 2143   BP: (!) 143/98 (!) 140/97   Pulse: 79 76   Resp: 20 20   Temp: 97.9 °F (36.6 °C)    TempSrc: Oral    SpO2: 97% 97%   Weight: 165 lb (74.8 kg)    Height: 5' 10\" (1.778 m)        Patient was given thefollowing medications:  Medications   oxymetazoline (AFRIN) 0.05 % nasal spray 2 spray (2 sprays Each Nostril Given by Other 7/22/21 2116)       ED COURSE & MEDICAL DECISION MAKING    Pertinent Labs & Imaging studies reviewed. (See chart for details)   -  Patient seen and evaluated in the emergency department. -  Triage and nursing notes reviewed and incorporated. -  Old chart records reviewed and incorporated. -  Differential diagnosis includes: Differential Diagnosis: Coagulopathy, Platelet Dysfunction, Thrombocytopenia, Anemia, Hypertension, Nasal Infection, Aspiration Pneumonia, other.    -  Work-up included:  See above  -  ED treatment included: See above  -  Results discussed with patient. Patient presents ED for evaluation of epistasis. On arrival hemostasis has been achieved. Patient was instructed to clear his nose and then Afrin was applied locally with a soaked piece of gauze and clamp. Gauze was removed and hemostasis was achieved. Patient feels improved on reevaluation. Symptomatic treatment with expectant management discussed with the patient and they and/or family members present are amenable to treatment plan and outpatient follow-up. Strict return precautions were discussed with the patient and those present. They demonstrated understanding of when to return to the emergency department for new or worsening symptoms. .  The patient is agreeable with plan of care and disposition.         REASSESSMENT          CRITICAL CARE TIME   Total Critical Care time was 0 minutes, excluding separately reportable procedures. There was a high probability of clinically significant/life threatening deterioration in the patient's condition which required my urgent intervention. CONSULTS:  None    PROCEDURES:  Unless otherwise noted below, none     Procedures    FINAL IMPRESSION      1. Epistaxis          DISPOSITION/PLAN   DISPOSITION        PATIENT REFERREDTO:  No follow-up provider specified.     DISCHARGEMEDICATIONS:  New Prescriptions    No medications on file          (Please note that portions of this note were completed with a voice recognition program.  Efforts were made to edit the dictations but occasionally words are mis-transcribed.)    Rabia Stokes MD (electronically signed)  Attending Emergency Physician          Rabia Stokes MD  07/22/21 0121

## 2021-10-01 ENCOUNTER — OFFICE VISIT (OUTPATIENT)
Dept: FAMILY MEDICINE CLINIC | Age: 86
End: 2021-10-01
Payer: MEDICARE

## 2021-10-01 VITALS
SYSTOLIC BLOOD PRESSURE: 128 MMHG | HEART RATE: 72 BPM | BODY MASS INDEX: 21.62 KG/M2 | DIASTOLIC BLOOD PRESSURE: 84 MMHG | TEMPERATURE: 97.4 F | HEIGHT: 70 IN | WEIGHT: 151 LBS

## 2021-10-01 DIAGNOSIS — Z85.79 H/O LYMPHOMA: ICD-10-CM

## 2021-10-01 DIAGNOSIS — Z23 NEEDS FLU SHOT: ICD-10-CM

## 2021-10-01 DIAGNOSIS — E78.2 MIXED HYPERLIPIDEMIA: Primary | ICD-10-CM

## 2021-10-01 PROCEDURE — G8484 FLU IMMUNIZE NO ADMIN: HCPCS | Performed by: FAMILY MEDICINE

## 2021-10-01 PROCEDURE — 99214 OFFICE O/P EST MOD 30 MIN: CPT | Performed by: FAMILY MEDICINE

## 2021-10-01 PROCEDURE — 4040F PNEUMOC VAC/ADMIN/RCVD: CPT | Performed by: FAMILY MEDICINE

## 2021-10-01 PROCEDURE — 1123F ACP DISCUSS/DSCN MKR DOCD: CPT | Performed by: FAMILY MEDICINE

## 2021-10-01 PROCEDURE — G0008 ADMIN INFLUENZA VIRUS VAC: HCPCS | Performed by: FAMILY MEDICINE

## 2021-10-01 PROCEDURE — G8427 DOCREV CUR MEDS BY ELIG CLIN: HCPCS | Performed by: FAMILY MEDICINE

## 2021-10-01 PROCEDURE — 90694 VACC AIIV4 NO PRSRV 0.5ML IM: CPT | Performed by: FAMILY MEDICINE

## 2021-10-01 PROCEDURE — 1036F TOBACCO NON-USER: CPT | Performed by: FAMILY MEDICINE

## 2021-10-01 PROCEDURE — G8420 CALC BMI NORM PARAMETERS: HCPCS | Performed by: FAMILY MEDICINE

## 2021-10-01 SDOH — ECONOMIC STABILITY: FOOD INSECURITY: WITHIN THE PAST 12 MONTHS, YOU WORRIED THAT YOUR FOOD WOULD RUN OUT BEFORE YOU GOT MONEY TO BUY MORE.: NEVER TRUE

## 2021-10-01 SDOH — ECONOMIC STABILITY: FOOD INSECURITY: WITHIN THE PAST 12 MONTHS, THE FOOD YOU BOUGHT JUST DIDN'T LAST AND YOU DIDN'T HAVE MONEY TO GET MORE.: NEVER TRUE

## 2021-10-01 ASSESSMENT — SOCIAL DETERMINANTS OF HEALTH (SDOH): HOW HARD IS IT FOR YOU TO PAY FOR THE VERY BASICS LIKE FOOD, HOUSING, MEDICAL CARE, AND HEATING?: NOT HARD AT ALL

## 2021-10-01 ASSESSMENT — ENCOUNTER SYMPTOMS
DIARRHEA: 0
COUGH: 0
BLOOD IN STOOL: 0
ABDOMINAL PAIN: 0
NAUSEA: 0
VOMITING: 0
CHEST TIGHTNESS: 0
CONSTIPATION: 0
SHORTNESS OF BREATH: 0
ABDOMINAL DISTENTION: 0

## 2021-10-01 NOTE — PROGRESS NOTES
Subjective:      Patient ID: Nathaniel Dumont is a 80 y.o. male. Chief Complaint   Patient presents with    6 Month Follow-Up     lipids - pt is fasting        Patient presents with:  6 Month Follow-Up: lipids - pt is fasting    He is well  He has no c/o  His wife notes not keeping him self well in that he has not had haircut  He tells me he did not want to go to the [facility] during the covid    He did give up driving due the visual pbs     YOB: 1931    Date of Visit:  10/1/2021     -- Eggs Or Egg-Derived Products -- Other (See Comments)    --  headaches    Current Outpatient Medications:  aspirin 81 MG tablet, Take 81 mg by mouth daily Takes once a week, Disp: , Rfl:   Ascorbic Acid (VITAMIN C) 500 MG tablet, Take 500 mg by mouth daily, Disp: , Rfl:   Vitamin D (CHOLECALCIFEROL) 1000 UNITS CAPS capsule, Take 1,000 Units by mouth daily. , Disp: , Rfl:   Multiple Vitamins-Minerals (MULTI COMPLETE PO), Take 1 tablet by mouth daily. , Disp: , Rfl:     No current facility-administered medications for this visit.      ---------------------------               10/01/21                      1014         ---------------------------   BP:          128/84         Site:    Left Upper Arm     Position:     Sitting        Cuff Size:  Medium Adult      Pulse:         72           Temp:   97.4 °F (36.3 °C)   TempSrc:    Temporal        Weight: 151 lb (68.5 kg)    Height: 5' 10\" (1.778 m)   ---------------------------  Body mass index is 21.67 kg/m². Wt Readings from Last 3 Encounters:  10/01/21 : 151 lb (68.5 kg)  07/22/21 : 165 lb (74.8 kg)  04/01/21 : 159 lb (72.1 kg)    BP Readings from Last 3 Encounters:  10/01/21 : 128/84  07/22/21 : (!) 140/97  04/01/21 : 138/84              Review of Systems   Constitutional: Negative for appetite change, chills, fever and unexpected weight change. Respiratory: Negative for cough, chest tightness and shortness of breath. Cardiovascular: Negative for chest pain, palpitations and leg swelling. Gastrointestinal: Negative for abdominal distention, abdominal pain, blood in stool, constipation, diarrhea, nausea and vomiting. Genitourinary: Negative for difficulty urinating, frequency and hematuria. Neurological: Negative for dizziness and headaches. Objective:   Physical Exam  Constitutional:       General: He is not in acute distress. Appearance: Normal appearance. He is well-developed. He is not ill-appearing or diaphoretic. HENT:      Head: Normocephalic and atraumatic. Eyes:      General: No scleral icterus. Neck:      Thyroid: No thyroid mass or thyromegaly. Cardiovascular:      Rate and Rhythm: Normal rate and regular rhythm. Heart sounds: Murmur heard. Systolic murmur is present with a grade of 2/6. No friction rub. No gallop. Pulmonary:      Effort: Pulmonary effort is normal. No tachypnea, accessory muscle usage or respiratory distress. Breath sounds: Normal breath sounds. No decreased breath sounds, wheezing, rhonchi or rales. Abdominal:      Palpations: Abdomen is soft. There is no hepatomegaly, splenomegaly, mass or pulsatile mass. Tenderness: There is no abdominal tenderness. There is no guarding. Musculoskeletal:      Cervical back: Neck supple. Lymphadenopathy:      Cervical: No cervical adenopathy. Upper Body:      Right upper body: No supraclavicular adenopathy. Left upper body: No supraclavicular adenopathy. Skin:     General: Skin is warm and dry. Coloration: Skin is not pale. Neurological:      Mental Status: He is alert. Assessment:        Diagnosis Orders   1. Mixed hyperlipidemia  Cholesterol, Total    Comprehensive Metabolic Panel    Vitamin B12 & Folate    TSH without Reflex   2. H/O lymphoma  Comprehensive Metabolic Panel    CBC Auto Differential    Vitamin B12 & Folate    TSH without Reflex   3.  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  He will see at times geometric patterns or more items than there or larger or smaller items than what is there       Plan:      Do get the blood work  See us in about 6 months         Jluis Prakash MD

## 2021-10-01 NOTE — PROGRESS NOTES
Vaccine Information Sheet, \"Influenza - Inactivated\"  given to Russell Thomas, or parent/legal guardian of  Russell Thomas and verbalized understanding. Patient responses:    Have you ever had a reaction to a flu vaccine? No  Do you have any current illness? No  Have you ever had Guillian [place] Syndrome? No  Do you have a serious allergy to any of the follow: Neomycin, Polymyxin, Thimerosal, eggs or egg products? No    Flu vaccine given per order. Please see immunization tab. Risks and benefits explained. Current VIS given.

## 2022-04-01 ENCOUNTER — OFFICE VISIT (OUTPATIENT)
Dept: FAMILY MEDICINE CLINIC | Age: 87
End: 2022-04-01
Payer: MEDICARE

## 2022-04-01 VITALS
SYSTOLIC BLOOD PRESSURE: 142 MMHG | DIASTOLIC BLOOD PRESSURE: 88 MMHG | WEIGHT: 151 LBS | TEMPERATURE: 97.3 F | BODY MASS INDEX: 25.16 KG/M2 | HEIGHT: 65 IN

## 2022-04-01 DIAGNOSIS — R04.0 EPISTAXIS: ICD-10-CM

## 2022-04-01 DIAGNOSIS — Z85.79 H/O LYMPHOMA: Primary | ICD-10-CM

## 2022-04-01 PROCEDURE — G8417 CALC BMI ABV UP PARAM F/U: HCPCS | Performed by: FAMILY MEDICINE

## 2022-04-01 PROCEDURE — 99212 OFFICE O/P EST SF 10 MIN: CPT | Performed by: FAMILY MEDICINE

## 2022-04-01 PROCEDURE — 1123F ACP DISCUSS/DSCN MKR DOCD: CPT | Performed by: FAMILY MEDICINE

## 2022-04-01 PROCEDURE — G8427 DOCREV CUR MEDS BY ELIG CLIN: HCPCS | Performed by: FAMILY MEDICINE

## 2022-04-01 PROCEDURE — 4040F PNEUMOC VAC/ADMIN/RCVD: CPT | Performed by: FAMILY MEDICINE

## 2022-04-01 PROCEDURE — 1036F TOBACCO NON-USER: CPT | Performed by: FAMILY MEDICINE

## 2022-04-01 ASSESSMENT — PATIENT HEALTH QUESTIONNAIRE - PHQ9
1. LITTLE INTEREST OR PLEASURE IN DOING THINGS: 0
2. FEELING DOWN, DEPRESSED OR HOPELESS: 0
SUM OF ALL RESPONSES TO PHQ9 QUESTIONS 1 & 2: 0
SUM OF ALL RESPONSES TO PHQ QUESTIONS 1-9: 0

## 2022-04-01 ASSESSMENT — ENCOUNTER SYMPTOMS
BLOOD IN STOOL: 0
SHORTNESS OF BREATH: 0
DIARRHEA: 0
ABDOMINAL PAIN: 0
CONSTIPATION: 0

## 2022-04-01 NOTE — PROGRESS NOTES
Subjective:      Patient ID: Savannah Lua is a 80 y.o. male. Chief Complaint   Patient presents with    6 Month Follow-Up     lipids         Patient presents with:  6 Month Follow-Up: lipids     He is well   Has had some recent nose bleeds    YOB: 1931    Date of Visit:  4/1/2022     -- Eggs Or Egg-Derived Products -- Other (See Comments)    --  headaches    Current Outpatient Medications:  aspirin 81 MG tablet, Take 81 mg by mouth daily Takes once a week, Disp: , Rfl:   Ascorbic Acid (VITAMIN C) 500 MG tablet, Take 500 mg by mouth daily, Disp: , Rfl:   Vitamin D (CHOLECALCIFEROL) 1000 UNITS CAPS capsule, Take 1,000 Units by mouth daily. , Disp: , Rfl:   Multiple Vitamins-Minerals (MULTI COMPLETE PO), Take 1 tablet by mouth daily. , Disp: , Rfl:     No current facility-administered medications for this visit.      ------------------------------------------               04/01/22 04/01/22                    1031            1032       ------------------------------------------   BP:        (!) 142/88      (!) 142/88     Site:    Left Upper Arm  Left Upper Arm   Position:     Sitting        Sitting       Cuff Size:  Medium Adult    Medium Adult    Temp:   97.3 °F (36.3 °C)                 TempSrc:    Temporal                      Weight: 151 lb (68.5 kg)                  Height:  5' 5\" (1.651 m)                 ------------------------------------------  Body mass index is 25.13 kg/m². Wt Readings from Last 3 Encounters:  04/01/22 : 151 lb (68.5 kg)  10/01/21 : 151 lb (68.5 kg)  07/22/21 : 165 lb (74.8 kg)    BP Readings from Last 3 Encounters:  04/01/22 : (!) 142/88  10/01/21 : 128/84  07/22/21 : (!) 140/97        Review of Systems   Constitutional: Negative for appetite change, chills, fever and unexpected weight change. Respiratory: Negative for shortness of breath. Cardiovascular: Negative for chest pain and palpitations. Gastrointestinal: Negative for abdominal pain, blood in stool, constipation and diarrhea. Genitourinary: Negative for difficulty urinating, dysuria and hematuria. Neurological: Negative for headaches. Objective:   Physical Exam  Constitutional:       General: He is not in acute distress. Appearance: Normal appearance. He is well-developed. He is not ill-appearing or diaphoretic. HENT:      Head: Normocephalic and atraumatic. Nose: No signs of injury or rhinorrhea. Right Nostril: No epistaxis. Left Nostril: No epistaxis. Cardiovascular:      Rate and Rhythm: Normal rate and regular rhythm. Heart sounds: Murmur heard. Systolic murmur is present with a grade of 2/6. No friction rub. No gallop. Pulmonary:      Effort: Pulmonary effort is normal. No tachypnea, accessory muscle usage or respiratory distress. Breath sounds: Normal breath sounds. No decreased breath sounds, wheezing, rhonchi or rales. Chest:   Breasts:      Right: No supraclavicular adenopathy. Left: No supraclavicular adenopathy. Lymphadenopathy:      Cervical: No cervical adenopathy. Upper Body:      Right upper body: No supraclavicular adenopathy. Left upper body: No supraclavicular adenopathy. Skin:     General: Skin is warm and dry. Coloration: Skin is not pale. Neurological:      Mental Status: He is alert. Assessment:        Diagnosis Orders   1. H/O lymphoma     2. Epistaxis            Stable   No change    I suggested ENT to see for the epistaxis episodes.  He declined to do to  I had talked to his wife earlier about this       Plan:      See us in 10 months         Konstantin Steinberg MD

## 2022-10-03 ENCOUNTER — OFFICE VISIT (OUTPATIENT)
Dept: FAMILY MEDICINE CLINIC | Age: 87
End: 2022-10-03
Payer: MEDICARE

## 2022-10-03 VITALS
BODY MASS INDEX: 24.49 KG/M2 | WEIGHT: 147 LBS | TEMPERATURE: 97.2 F | SYSTOLIC BLOOD PRESSURE: 138 MMHG | HEART RATE: 68 BPM | HEIGHT: 65 IN | DIASTOLIC BLOOD PRESSURE: 88 MMHG

## 2022-10-03 DIAGNOSIS — E78.2 MIXED HYPERLIPIDEMIA: Primary | ICD-10-CM

## 2022-10-03 DIAGNOSIS — Z85.79 H/O LYMPHOMA: ICD-10-CM

## 2022-10-03 DIAGNOSIS — R10.9 ABDOMINAL DISCOMFORT: ICD-10-CM

## 2022-10-03 DIAGNOSIS — Z23 NEEDS FLU SHOT: ICD-10-CM

## 2022-10-03 LAB
A/G RATIO: 2 (ref 1.1–2.2)
ALBUMIN SERPL-MCNC: 4.4 G/DL (ref 3.4–5)
ALP BLD-CCNC: 65 U/L (ref 40–129)
ALT SERPL-CCNC: 16 U/L (ref 10–40)
ANION GAP SERPL CALCULATED.3IONS-SCNC: 12 MMOL/L (ref 3–16)
AST SERPL-CCNC: 36 U/L (ref 15–37)
BACTERIA: ABNORMAL /HPF
BASOPHILS ABSOLUTE: 0 K/UL (ref 0–0.2)
BASOPHILS RELATIVE PERCENT: 0.5 %
BILIRUB SERPL-MCNC: 0.8 MG/DL (ref 0–1)
BILIRUBIN URINE: NEGATIVE
BLOOD, URINE: NEGATIVE
BUN BLDV-MCNC: 23 MG/DL (ref 7–20)
CALCIUM SERPL-MCNC: 9.1 MG/DL (ref 8.3–10.6)
CHLORIDE BLD-SCNC: 105 MMOL/L (ref 99–110)
CLARITY: CLEAR
CO2: 24 MMOL/L (ref 21–32)
COLOR: YELLOW
CREAT SERPL-MCNC: 1.1 MG/DL (ref 0.8–1.3)
EOSINOPHILS ABSOLUTE: 0.1 K/UL (ref 0–0.6)
EOSINOPHILS RELATIVE PERCENT: 1.5 %
EPITHELIAL CELLS, UA: 0 /HPF (ref 0–5)
GFR AFRICAN AMERICAN: >60
GFR NON-AFRICAN AMERICAN: >60
GLUCOSE BLD-MCNC: 82 MG/DL (ref 70–99)
GLUCOSE URINE: NEGATIVE MG/DL
HCT VFR BLD CALC: 36.3 % (ref 40.5–52.5)
HEMOGLOBIN: 12.4 G/DL (ref 13.5–17.5)
HYALINE CASTS: 0 /LPF (ref 0–8)
KETONES, URINE: NEGATIVE MG/DL
LEUKOCYTE ESTERASE, URINE: NEGATIVE
LYMPHOCYTES ABSOLUTE: 0.4 K/UL (ref 1–5.1)
LYMPHOCYTES RELATIVE PERCENT: 8.6 %
MCH RBC QN AUTO: 33.4 PG (ref 26–34)
MCHC RBC AUTO-ENTMCNC: 34 G/DL (ref 31–36)
MCV RBC AUTO: 98.3 FL (ref 80–100)
MICROSCOPIC EXAMINATION: YES
MONOCYTES ABSOLUTE: 0.4 K/UL (ref 0–1.3)
MONOCYTES RELATIVE PERCENT: 9.1 %
NEUTROPHILS ABSOLUTE: 3.4 K/UL (ref 1.7–7.7)
NEUTROPHILS RELATIVE PERCENT: 80.3 %
NITRITE, URINE: NEGATIVE
PDW BLD-RTO: 13.5 % (ref 12.4–15.4)
PH UA: 6.5 (ref 5–8)
PLATELET # BLD: 159 K/UL (ref 135–450)
PMV BLD AUTO: 7.4 FL (ref 5–10.5)
POTASSIUM SERPL-SCNC: 4.8 MMOL/L (ref 3.5–5.1)
PROTEIN UA: ABNORMAL MG/DL
RBC # BLD: 3.7 M/UL (ref 4.2–5.9)
RBC UA: 2 /HPF (ref 0–4)
SODIUM BLD-SCNC: 141 MMOL/L (ref 136–145)
SPECIFIC GRAVITY UA: 1.02 (ref 1–1.03)
TOTAL PROTEIN: 6.6 G/DL (ref 6.4–8.2)
URINE TYPE: ABNORMAL
UROBILINOGEN, URINE: 0.2 E.U./DL
WBC # BLD: 4.2 K/UL (ref 4–11)
WBC UA: 0 /HPF (ref 0–5)

## 2022-10-03 PROCEDURE — 1123F ACP DISCUSS/DSCN MKR DOCD: CPT | Performed by: FAMILY MEDICINE

## 2022-10-03 PROCEDURE — 99214 OFFICE O/P EST MOD 30 MIN: CPT | Performed by: FAMILY MEDICINE

## 2022-10-03 PROCEDURE — G8427 DOCREV CUR MEDS BY ELIG CLIN: HCPCS | Performed by: FAMILY MEDICINE

## 2022-10-03 PROCEDURE — G8484 FLU IMMUNIZE NO ADMIN: HCPCS | Performed by: FAMILY MEDICINE

## 2022-10-03 PROCEDURE — G8420 CALC BMI NORM PARAMETERS: HCPCS | Performed by: FAMILY MEDICINE

## 2022-10-03 PROCEDURE — G0008 ADMIN INFLUENZA VIRUS VAC: HCPCS | Performed by: FAMILY MEDICINE

## 2022-10-03 PROCEDURE — 90694 VACC AIIV4 NO PRSRV 0.5ML IM: CPT | Performed by: FAMILY MEDICINE

## 2022-10-03 PROCEDURE — 1036F TOBACCO NON-USER: CPT | Performed by: FAMILY MEDICINE

## 2022-10-03 SDOH — ECONOMIC STABILITY: FOOD INSECURITY: WITHIN THE PAST 12 MONTHS, THE FOOD YOU BOUGHT JUST DIDN'T LAST AND YOU DIDN'T HAVE MONEY TO GET MORE.: NEVER TRUE

## 2022-10-03 SDOH — ECONOMIC STABILITY: TRANSPORTATION INSECURITY
IN THE PAST 12 MONTHS, HAS THE LACK OF TRANSPORTATION KEPT YOU FROM MEDICAL APPOINTMENTS OR FROM GETTING MEDICATIONS?: NO

## 2022-10-03 SDOH — ECONOMIC STABILITY: FOOD INSECURITY: WITHIN THE PAST 12 MONTHS, YOU WORRIED THAT YOUR FOOD WOULD RUN OUT BEFORE YOU GOT MONEY TO BUY MORE.: NEVER TRUE

## 2022-10-03 SDOH — ECONOMIC STABILITY: TRANSPORTATION INSECURITY
IN THE PAST 12 MONTHS, HAS LACK OF TRANSPORTATION KEPT YOU FROM MEETINGS, WORK, OR FROM GETTING THINGS NEEDED FOR DAILY LIVING?: NO

## 2022-10-03 ASSESSMENT — PATIENT HEALTH QUESTIONNAIRE - PHQ9
SUM OF ALL RESPONSES TO PHQ QUESTIONS 1-9: 0
1. LITTLE INTEREST OR PLEASURE IN DOING THINGS: 0
2. FEELING DOWN, DEPRESSED OR HOPELESS: 0
SUM OF ALL RESPONSES TO PHQ9 QUESTIONS 1 & 2: 0
SUM OF ALL RESPONSES TO PHQ QUESTIONS 1-9: 0

## 2022-10-03 ASSESSMENT — SOCIAL DETERMINANTS OF HEALTH (SDOH): HOW HARD IS IT FOR YOU TO PAY FOR THE VERY BASICS LIKE FOOD, HOUSING, MEDICAL CARE, AND HEATING?: NOT HARD AT ALL

## 2022-10-03 NOTE — PROGRESS NOTES
Vaccine Information Sheet, \"Influenza - Inactivated\"  given to Iris Guo, or parent/legal guardian of  Iris Guo and verbalized understanding. Patient responses:    Have you ever had a reaction to a flu vaccine? No  Do you have any current illness? No  Have you ever had Guillian [place] Syndrome? No  Do you have a serious allergy to any of the follow: Neomycin, Polymyxin, Thimerosal, eggs or egg products? No    Flu vaccine given per order. Please see immunization tab. Risks and benefits explained. Current VIS given.

## 2022-10-03 NOTE — PROGRESS NOTES
Subjective:      Patient ID: Miguelangel Farrell is a 80 y.o. male. Chief Complaint   Patient presents with    6 Month Follow-Up     Lipids - pt is not fasting        Patient presents with:  6 Month Follow-Up: Lipids - pt is not fasting    He is well and no c/o  Will have some right side abd pain comes and goes  He tells me since the hernia surgery and for several years  That was in 2017. Since then has had neg ct of abd and pelvis   It is sporadic and seems to be more mechanical caused when he notes it  Goes away by self  No affect with food or bm   No blood in stool  Urine is passing well no pain no blood  No cp no sob  No ha    YOB: 1931    Date of Visit:  10/3/2022     -- Eggs Or Egg-Derived Products -- Other (See Comments)    --  headaches    Current Outpatient Medications:  aspirin 81 MG tablet, Take 81 mg by mouth daily Takes once a week, Disp: , Rfl:   Ascorbic Acid (VITAMIN C) 500 MG tablet, Take 500 mg by mouth daily, Disp: , Rfl:   Vitamin D (CHOLECALCIFEROL) 1000 UNITS CAPS capsule, Take 1,000 Units by mouth daily. , Disp: , Rfl:   Multiple Vitamins-Minerals (MULTI COMPLETE PO), Take 1 tablet by mouth daily. , Disp: , Rfl:     No current facility-administered medications for this visit.      ---------------------------               10/03/22                      1030         ---------------------------   BP:          138/88         Site:    Left Upper Arm     Position:     Sitting        Cuff Size:  Medium Adult      Pulse:         68           Temp:   97.2 °F (36.2 °C)   TempSrc:    Temporal        Weight: 147 lb (66.7 kg)    Height:  5' 5\" (1.651 m)   ---------------------------  Body mass index is 24.46 kg/m².      Wt Readings from Last 3 Encounters:  10/03/22 : 147 lb (66.7 kg)  04/01/22 : 151 lb (68.5 kg)  10/01/21 : 151 lb (68.5 kg)    BP Readings from Last 3 Encounters:  10/03/22 : 138/88  04/01/22 : (!) 142/88  10/01/21 : 128/84          Review of Systems    Objective:   Physical Exam  Constitutional:       General: He is not in acute distress. Appearance: Normal appearance. He is well-developed. He is not diaphoretic. Cardiovascular:      Rate and Rhythm: Normal rate and regular rhythm. Heart sounds: Murmur heard. Systolic murmur is present with a grade of 2/6. No friction rub. No gallop. Comments:   Rocío along the sternum  Pulmonary:      Effort: Pulmonary effort is normal. No tachypnea, accessory muscle usage or respiratory distress. Breath sounds: Normal breath sounds. No decreased breath sounds, wheezing, rhonchi or rales. Abdominal:      General: Bowel sounds are normal. There is no distension or abdominal bruit. Palpations: Abdomen is soft. There is no hepatomegaly, splenomegaly, mass or pulsatile mass. Tenderness: There is no abdominal tenderness. There is no guarding. Lymphadenopathy:      Cervical: No cervical adenopathy. Upper Body:      Right upper body: No supraclavicular adenopathy. Left upper body: No supraclavicular adenopathy. Skin:     General: Skin is warm and dry. Coloration: Skin is not pale. Nails: There is no clubbing. Neurological:      General: No focal deficit present. Mental Status: He is alert. Assessment:       Diagnosis Orders   1. Mixed hyperlipidemia        2. Abdominal discomfort  Comprehensive Metabolic Panel    CBC with Auto Differential    Urinalysis with Microscopic      3.  H/O lymphoma  CBC with Auto Differential          Chronic pain and likely mechanical/scarring  He did not want to investigate this with surgical eval or ct    Reviewed derm visit from 7/6/22      Plan:      Do the additional testing  Call if worse  See in about 6 months         Maki Galloway MD

## 2023-04-03 ENCOUNTER — OFFICE VISIT (OUTPATIENT)
Dept: FAMILY MEDICINE CLINIC | Age: 88
End: 2023-04-03

## 2023-04-03 VITALS
HEART RATE: 76 BPM | TEMPERATURE: 97.2 F | DIASTOLIC BLOOD PRESSURE: 82 MMHG | WEIGHT: 154 LBS | HEIGHT: 65 IN | BODY MASS INDEX: 25.66 KG/M2 | SYSTOLIC BLOOD PRESSURE: 138 MMHG

## 2023-04-03 DIAGNOSIS — M25.531 RIGHT WRIST PAIN: Primary | ICD-10-CM

## 2023-04-03 DIAGNOSIS — L98.9 SKIN LESION OF SCALP: ICD-10-CM

## 2023-04-03 SDOH — ECONOMIC STABILITY: FOOD INSECURITY: WITHIN THE PAST 12 MONTHS, THE FOOD YOU BOUGHT JUST DIDN'T LAST AND YOU DIDN'T HAVE MONEY TO GET MORE.: NEVER TRUE

## 2023-04-03 SDOH — ECONOMIC STABILITY: INCOME INSECURITY: HOW HARD IS IT FOR YOU TO PAY FOR THE VERY BASICS LIKE FOOD, HOUSING, MEDICAL CARE, AND HEATING?: NOT HARD AT ALL

## 2023-04-03 SDOH — ECONOMIC STABILITY: FOOD INSECURITY: WITHIN THE PAST 12 MONTHS, YOU WORRIED THAT YOUR FOOD WOULD RUN OUT BEFORE YOU GOT MONEY TO BUY MORE.: NEVER TRUE

## 2023-04-03 SDOH — ECONOMIC STABILITY: HOUSING INSECURITY
IN THE LAST 12 MONTHS, WAS THERE A TIME WHEN YOU DID NOT HAVE A STEADY PLACE TO SLEEP OR SLEPT IN A SHELTER (INCLUDING NOW)?: NO

## 2023-04-03 ASSESSMENT — ENCOUNTER SYMPTOMS
ABDOMINAL PAIN: 0
DIARRHEA: 0
SHORTNESS OF BREATH: 0
CONSTIPATION: 0
BLOOD IN STOOL: 0

## 2023-04-03 NOTE — PROGRESS NOTES
Negative for abdominal pain, blood in stool, constipation and diarrhea. Hx of constipation     Genitourinary:  Negative for difficulty urinating, dysuria and hematuria. Neurological:  Negative for headaches. Objective:   Physical Exam  Constitutional:       General: He is not in acute distress. Appearance: Normal appearance. He is well-developed. He is not ill-appearing or diaphoretic. Cardiovascular:      Rate and Rhythm: Normal rate and regular rhythm. Heart sounds: Murmur heard. Systolic murmur is present with a grade of 2/6. No friction rub. No gallop. Comments: Systolic murmur   Pulmonary:      Effort: Pulmonary effort is normal. No tachypnea, accessory muscle usage or respiratory distress. Breath sounds: Normal breath sounds. No decreased breath sounds, wheezing, rhonchi or rales. Musculoskeletal:      Comments: Localized soft tissue swelling about the dorsal wrist no pain no marks  Supple no pain    Lymphadenopathy:      Cervical: No cervical adenopathy. Upper Body:      Right upper body: No supraclavicular adenopathy. Left upper body: No supraclavicular adenopathy. Skin:     General: Skin is warm and dry. Coloration: Skin is not pale. Comments: Small about one cm crusted area on the scalp    Neurological:      Mental Status: He is alert. Assessment:       Diagnosis Orders   1. Right wrist pain        2. Skin lesion of scalp            I had spoken to the patient wife about the skin as well and she noted he did not want to go to derm.   He was encouraged to do the visit as he has had ca of skin in past     Soft ganglion lipomatous area on wrist   Will treat with otc and instructed to call if not improved       Plan:      Do see the dermatology person for the skin area on the scalp   See in about august or September   Use voltaren gel over the counter on the right wrist        Tacos Small MD

## 2023-10-03 ENCOUNTER — OFFICE VISIT (OUTPATIENT)
Dept: FAMILY MEDICINE CLINIC | Age: 88
End: 2023-10-03

## 2023-10-03 VITALS
DIASTOLIC BLOOD PRESSURE: 80 MMHG | BODY MASS INDEX: 25.33 KG/M2 | WEIGHT: 152 LBS | TEMPERATURE: 97.2 F | HEIGHT: 65 IN | SYSTOLIC BLOOD PRESSURE: 134 MMHG | HEART RATE: 64 BPM

## 2023-10-03 DIAGNOSIS — Z85.72 H/O LYMPHOMA: Primary | ICD-10-CM

## 2023-10-03 DIAGNOSIS — L98.9 SKIN LESIONS: ICD-10-CM

## 2023-10-03 DIAGNOSIS — I35.0 NONRHEUMATIC AORTIC VALVE STENOSIS: ICD-10-CM

## 2023-10-03 DIAGNOSIS — Z23 NEEDS FLU SHOT: ICD-10-CM

## 2023-10-03 ASSESSMENT — PATIENT HEALTH QUESTIONNAIRE - PHQ9
1. LITTLE INTEREST OR PLEASURE IN DOING THINGS: 0
2. FEELING DOWN, DEPRESSED OR HOPELESS: 0
SUM OF ALL RESPONSES TO PHQ QUESTIONS 1-9: 0
SUM OF ALL RESPONSES TO PHQ9 QUESTIONS 1 & 2: 0

## 2023-10-03 ASSESSMENT — ENCOUNTER SYMPTOMS
ABDOMINAL PAIN: 0
CONSTIPATION: 0
BLOOD IN STOOL: 0
SHORTNESS OF BREATH: 0
DIARRHEA: 0

## 2023-10-03 NOTE — PATIENT INSTRUCTIONS
Continue the same for now  See the local dermatology group  See us back in the office in about 6 months

## 2024-01-09 ENCOUNTER — TELEPHONE (OUTPATIENT)
Dept: FAMILY MEDICINE CLINIC | Age: 89
End: 2024-01-09

## 2024-01-09 NOTE — TELEPHONE ENCOUNTER
DELMIS patients wife Bailee regarding scheduling his initial AWV with [facility]. She is currently seeing another physician for cancer that is on her legs and she is receiving treatment for at this time. She is not interested at this time, but will call the office to schedule once she can.

## 2024-04-02 ENCOUNTER — OFFICE VISIT (OUTPATIENT)
Dept: FAMILY MEDICINE CLINIC | Age: 89
End: 2024-04-02

## 2024-04-02 VITALS
HEIGHT: 65 IN | HEART RATE: 72 BPM | WEIGHT: 143 LBS | SYSTOLIC BLOOD PRESSURE: 136 MMHG | BODY MASS INDEX: 23.82 KG/M2 | TEMPERATURE: 97.4 F | DIASTOLIC BLOOD PRESSURE: 84 MMHG

## 2024-04-02 DIAGNOSIS — L98.9 SKIN LESIONS: ICD-10-CM

## 2024-04-02 DIAGNOSIS — Z85.72 H/O LYMPHOMA: Primary | ICD-10-CM

## 2024-04-02 ASSESSMENT — PATIENT HEALTH QUESTIONNAIRE - PHQ9
1. LITTLE INTEREST OR PLEASURE IN DOING THINGS: NOT AT ALL
2. FEELING DOWN, DEPRESSED OR HOPELESS: NOT AT ALL
SUM OF ALL RESPONSES TO PHQ9 QUESTIONS 1 & 2: 0
SUM OF ALL RESPONSES TO PHQ QUESTIONS 1-9: 0

## 2024-04-02 NOTE — PATIENT INSTRUCTIONS
Do see the skin dr at [facility] in [place]   Also do the blood work   See us in 6 months

## 2024-04-02 NOTE — PROGRESS NOTES
Subjective:      Patient ID: Ran Espinoza is a 89 y.o. male.    Chief Complaint   Patient presents with    6 Month Follow-Up     Lipids         Patient presents with:  6 Month Follow-Up: Lipids     He is well   He notes some sore on the lateral ankle over the malleoli for about 2 months  No injury     No c/o    YOB: 1931    Date of Visit:  4/2/2024     -- Egg-Derived Products -- Other (See Comments)    --  headaches    Current Outpatient Medications:  Ascorbic Acid (VITAMIN C) 500 MG tablet, Take 1 tablet by mouth daily, Disp: , Rfl:   Vitamin D (CHOLECALCIFEROL) 1000 UNITS CAPS capsule, Take 1 capsule by mouth daily, Disp: , Rfl:   Multiple Vitamins-Minerals (MULTI COMPLETE PO), Take 1 tablet by mouth daily., Disp: , Rfl:   aspirin 81 MG tablet, Take 1 tablet by mouth daily Takes once a week (Patient not taking: Reported on 4/2/2024), Disp: , Rfl:     No current facility-administered medications for this visit.      ---------------------------               04/02/24                      1027         ---------------------------   BP:          136/84         Site:    Left Upper Arm     Position:     Sitting        Cuff Size:  Medium Adult      Pulse:         72           Temp:   97.4 °F (36.3 °C)   TempSrc:    Temporal        Weight: 64.9 kg (143 lb)    Height:  1.651 m (5' 5\")   ---------------------------  Body mass index is 23.8 kg/m².     Wt Readings from Last 3 Encounters:  04/02/24 : 64.9 kg (143 lb)  10/03/23 : 68.9 kg (152 lb)  04/03/23 : 69.9 kg (154 lb)    BP Readings from Last 3 Encounters:  04/02/24 : 136/84  10/03/23 : 134/80  04/03/23 : 138/82                Review of Systems    Objective:   Physical Exam  Constitutional:       General: He is not in acute distress.     Appearance: He is not ill-appearing.   Cardiovascular:      Heart sounds: Murmur heard.      Systolic murmur is present with a grade of 2/6.   Musculoskeletal:      Cervical back:

## 2024-10-30 ENCOUNTER — TELEPHONE (OUTPATIENT)
Dept: FAMILY MEDICINE CLINIC | Age: 89
End: 2024-10-30

## 2024-10-30 NOTE — TELEPHONE ENCOUNTER
Patient due for 6 month follow up with Dr. Mahmood. Patient due for AWV. Can do both on the same day.   Patient will call back to schedule.

## 2024-12-23 ENCOUNTER — APPOINTMENT (OUTPATIENT)
Dept: GENERAL RADIOLOGY | Age: 88
DRG: 812 | End: 2024-12-23
Payer: MEDICARE

## 2024-12-23 ENCOUNTER — HOSPITAL ENCOUNTER (INPATIENT)
Age: 88
LOS: 4 days | Discharge: NURSING, REHAB OR LONG-TERM CARE | DRG: 812 | End: 2024-12-27
Attending: HOSPITALIST | Admitting: HOSPITALIST
Payer: MEDICARE

## 2024-12-23 ENCOUNTER — OFFICE VISIT (OUTPATIENT)
Dept: FAMILY MEDICINE CLINIC | Age: 88
End: 2024-12-23

## 2024-12-23 VITALS — OXYGEN SATURATION: 88 % | SYSTOLIC BLOOD PRESSURE: 118 MMHG | TEMPERATURE: 97.4 F | DIASTOLIC BLOOD PRESSURE: 70 MMHG

## 2024-12-23 DIAGNOSIS — R09.02 HYPOXIA: ICD-10-CM

## 2024-12-23 DIAGNOSIS — R63.4 WEIGHT LOSS: ICD-10-CM

## 2024-12-23 DIAGNOSIS — R06.09 DYSPNEA ON EXERTION: ICD-10-CM

## 2024-12-23 DIAGNOSIS — C82.01 GRADE 1 FOLLICULAR LYMPHOMA OF LYMPH NODES OF HEAD (HCC): ICD-10-CM

## 2024-12-23 DIAGNOSIS — D64.9 ANEMIA, UNSPECIFIED TYPE: Primary | ICD-10-CM

## 2024-12-23 DIAGNOSIS — Z60.9 POOR SOCIAL SITUATION: ICD-10-CM

## 2024-12-23 DIAGNOSIS — R06.09 DOE (DYSPNEA ON EXERTION): Primary | ICD-10-CM

## 2024-12-23 DIAGNOSIS — R26.81 UNSTEADY GAIT: ICD-10-CM

## 2024-12-23 LAB
ABO + RH BLD: NORMAL
ALBUMIN SERPL-MCNC: 3.7 G/DL (ref 3.4–5)
ALBUMIN/GLOB SERPL: 1.7 {RATIO} (ref 1.1–2.2)
ALP SERPL-CCNC: 87 U/L (ref 40–129)
ALT SERPL-CCNC: 21 U/L (ref 10–40)
ANION GAP SERPL CALCULATED.3IONS-SCNC: 13 MMOL/L (ref 3–16)
AST SERPL-CCNC: 40 U/L (ref 15–37)
BASE EXCESS BLDV CALC-SCNC: -0.5 MMOL/L
BASOPHILS # BLD: 0 K/UL (ref 0–0.2)
BASOPHILS NFR BLD: 0.5 %
BILIRUB SERPL-MCNC: 0.3 MG/DL (ref 0–1)
BLD GP AB SCN SERPL QL: NORMAL
BLOOD BANK DISPENSE STATUS: NORMAL
BLOOD BANK DISPENSE STATUS: NORMAL
BLOOD BANK PRODUCT CODE: NORMAL
BLOOD BANK PRODUCT CODE: NORMAL
BPU ID: NORMAL
BPU ID: NORMAL
BUN SERPL-MCNC: 55 MG/DL (ref 7–20)
CALCIUM SERPL-MCNC: 8.6 MG/DL (ref 8.3–10.6)
CHLORIDE SERPL-SCNC: 110 MMOL/L (ref 99–110)
CO2 BLDV-SCNC: 26 MMOL/L
CO2 SERPL-SCNC: 21 MMOL/L (ref 21–32)
COHGB MFR BLDV: 1.4 %
CREAT SERPL-MCNC: 1.3 MG/DL (ref 0.8–1.3)
DEPRECATED RDW RBC AUTO: 14.2 % (ref 12.4–15.4)
DESCRIPTION BLOOD BANK: NORMAL
DESCRIPTION BLOOD BANK: NORMAL
EKG ATRIAL RATE: 72 BPM
EKG DIAGNOSIS: NORMAL
EKG P AXIS: 86 DEGREES
EKG P-R INTERVAL: 106 MS
EKG Q-T INTERVAL: 416 MS
EKG QRS DURATION: 100 MS
EKG QTC CALCULATION (BAZETT): 455 MS
EKG R AXIS: 30 DEGREES
EKG T AXIS: 76 DEGREES
EKG VENTRICULAR RATE: 72 BPM
EOSINOPHIL # BLD: 0 K/UL (ref 0–0.6)
EOSINOPHIL NFR BLD: 0.1 %
FERRITIN SERPL IA-MCNC: 31.1 NG/ML (ref 30–400)
FLUAV + FLUBV AG NOSE IA.RAPID: NOT DETECTED
FLUAV + FLUBV AG NOSE IA.RAPID: NOT DETECTED
GFR SERPLBLD CREATININE-BSD FMLA CKD-EPI: 51 ML/MIN/{1.73_M2}
GLUCOSE SERPL-MCNC: 89 MG/DL (ref 70–99)
HCO3 BLDV-SCNC: 25 MMOL/L (ref 23–29)
HCT VFR BLD AUTO: 19 % (ref 40.5–52.5)
HCT VFR BLD AUTO: 20.5 % (ref 40.5–52.5)
HEMOCCULT STL QL: NORMAL
HGB BLD-MCNC: 6.2 G/DL (ref 13.5–17.5)
HGB BLD-MCNC: 6.7 G/DL (ref 13.5–17.5)
IRON SATN MFR SERPL: 4 % (ref 20–50)
IRON SERPL-MCNC: 10 UG/DL (ref 59–158)
LYMPHOCYTES # BLD: 0.2 K/UL (ref 1–5.1)
LYMPHOCYTES NFR BLD: 3.3 %
MCH RBC QN AUTO: 32.4 PG (ref 26–34)
MCHC RBC AUTO-ENTMCNC: 32.4 G/DL (ref 31–36)
MCV RBC AUTO: 99.8 FL (ref 80–100)
METHGB MFR BLDV: 0.7 %
MONOCYTES # BLD: 0.3 K/UL (ref 0–1.3)
MONOCYTES NFR BLD: 5.2 %
NEUTROPHILS # BLD: 5.4 K/UL (ref 1.7–7.7)
NEUTROPHILS NFR BLD: 90.9 %
NT-PROBNP SERPL-MCNC: 4088 PG/ML (ref 0–449)
O2 THERAPY: NORMAL
PCO2 BLDV: 41.1 MMHG (ref 40–50)
PH BLDV: 7.38 [PH] (ref 7.35–7.45)
PLATELET # BLD AUTO: 228 K/UL (ref 135–450)
PMV BLD AUTO: 7.3 FL (ref 5–10.5)
PO2 BLDV: <30 MMHG
POTASSIUM SERPL-SCNC: 4.3 MMOL/L (ref 3.5–5.1)
PROT SERPL-MCNC: 5.9 G/DL (ref 6.4–8.2)
RBC # BLD AUTO: 1.91 M/UL (ref 4.2–5.9)
SAO2 % BLDV: 30 %
SARS-COV-2 RDRP RESP QL NAA+PROBE: NOT DETECTED
SODIUM SERPL-SCNC: 144 MMOL/L (ref 136–145)
TIBC SERPL-MCNC: 272 UG/DL (ref 260–445)
TROPONIN, HIGH SENSITIVITY: 61 NG/L (ref 0–22)
TROPONIN, HIGH SENSITIVITY: 72 NG/L (ref 0–22)
WBC # BLD AUTO: 6 K/UL (ref 4–11)

## 2024-12-23 PROCEDURE — 87635 SARS-COV-2 COVID-19 AMP PRB: CPT

## 2024-12-23 PROCEDURE — 71045 X-RAY EXAM CHEST 1 VIEW: CPT

## 2024-12-23 PROCEDURE — 82728 ASSAY OF FERRITIN: CPT

## 2024-12-23 PROCEDURE — 85025 COMPLETE CBC W/AUTO DIFF WBC: CPT

## 2024-12-23 PROCEDURE — 86850 RBC ANTIBODY SCREEN: CPT

## 2024-12-23 PROCEDURE — 6370000000 HC RX 637 (ALT 250 FOR IP): Performed by: HOSPITALIST

## 2024-12-23 PROCEDURE — 93005 ELECTROCARDIOGRAM TRACING: CPT | Performed by: PHYSICIAN ASSISTANT

## 2024-12-23 PROCEDURE — 80053 COMPREHEN METABOLIC PANEL: CPT

## 2024-12-23 PROCEDURE — 84484 ASSAY OF TROPONIN QUANT: CPT

## 2024-12-23 PROCEDURE — 85018 HEMOGLOBIN: CPT

## 2024-12-23 PROCEDURE — 86900 BLOOD TYPING SEROLOGIC ABO: CPT

## 2024-12-23 PROCEDURE — 1200000000 HC SEMI PRIVATE

## 2024-12-23 PROCEDURE — 86901 BLOOD TYPING SEROLOGIC RH(D): CPT

## 2024-12-23 PROCEDURE — P9016 RBC LEUKOCYTES REDUCED: HCPCS

## 2024-12-23 PROCEDURE — 93010 ELECTROCARDIOGRAM REPORT: CPT | Performed by: INTERNAL MEDICINE

## 2024-12-23 PROCEDURE — 99285 EMERGENCY DEPT VISIT HI MDM: CPT

## 2024-12-23 PROCEDURE — 2700000000 HC OXYGEN THERAPY PER DAY

## 2024-12-23 PROCEDURE — 87502 INFLUENZA DNA AMP PROBE: CPT

## 2024-12-23 PROCEDURE — 82270 OCCULT BLOOD FECES: CPT

## 2024-12-23 PROCEDURE — 36415 COLL VENOUS BLD VENIPUNCTURE: CPT

## 2024-12-23 PROCEDURE — 82803 BLOOD GASES ANY COMBINATION: CPT

## 2024-12-23 PROCEDURE — 83540 ASSAY OF IRON: CPT

## 2024-12-23 PROCEDURE — 83880 ASSAY OF NATRIURETIC PEPTIDE: CPT

## 2024-12-23 PROCEDURE — 30233N1 TRANSFUSION OF NONAUTOLOGOUS RED BLOOD CELLS INTO PERIPHERAL VEIN, PERCUTANEOUS APPROACH: ICD-10-PCS | Performed by: HOSPITALIST

## 2024-12-23 PROCEDURE — 86923 COMPATIBILITY TEST ELECTRIC: CPT

## 2024-12-23 PROCEDURE — 36430 TRANSFUSION BLD/BLD COMPNT: CPT

## 2024-12-23 PROCEDURE — 85014 HEMATOCRIT: CPT

## 2024-12-23 PROCEDURE — 94761 N-INVAS EAR/PLS OXIMETRY MLT: CPT

## 2024-12-23 PROCEDURE — 83550 IRON BINDING TEST: CPT

## 2024-12-23 RX ORDER — SODIUM CHLORIDE 0.9 % (FLUSH) 0.9 %
5-40 SYRINGE (ML) INJECTION PRN
Status: DISCONTINUED | OUTPATIENT
Start: 2024-12-23 | End: 2024-12-27 | Stop reason: HOSPADM

## 2024-12-23 RX ORDER — SODIUM CHLORIDE 9 MG/ML
INJECTION, SOLUTION INTRAVENOUS PRN
Status: DISCONTINUED | OUTPATIENT
Start: 2024-12-23 | End: 2024-12-27 | Stop reason: HOSPADM

## 2024-12-23 RX ORDER — ONDANSETRON 2 MG/ML
4 INJECTION INTRAMUSCULAR; INTRAVENOUS EVERY 6 HOURS PRN
Status: DISCONTINUED | OUTPATIENT
Start: 2024-12-23 | End: 2024-12-27 | Stop reason: HOSPADM

## 2024-12-23 RX ORDER — ONDANSETRON 4 MG/1
4 TABLET, ORALLY DISINTEGRATING ORAL EVERY 8 HOURS PRN
Status: DISCONTINUED | OUTPATIENT
Start: 2024-12-23 | End: 2024-12-27 | Stop reason: HOSPADM

## 2024-12-23 RX ORDER — POTASSIUM CHLORIDE 7.45 MG/ML
10 INJECTION INTRAVENOUS PRN
Status: DISCONTINUED | OUTPATIENT
Start: 2024-12-23 | End: 2024-12-27 | Stop reason: HOSPADM

## 2024-12-23 RX ORDER — ACETAMINOPHEN 325 MG/1
650 TABLET ORAL EVERY 6 HOURS PRN
Status: DISCONTINUED | OUTPATIENT
Start: 2024-12-23 | End: 2024-12-27 | Stop reason: HOSPADM

## 2024-12-23 RX ORDER — POLYETHYLENE GLYCOL 3350 17 G/17G
17 POWDER, FOR SOLUTION ORAL DAILY PRN
Status: DISCONTINUED | OUTPATIENT
Start: 2024-12-23 | End: 2024-12-27 | Stop reason: HOSPADM

## 2024-12-23 RX ORDER — POTASSIUM CHLORIDE 1500 MG/1
40 TABLET, EXTENDED RELEASE ORAL PRN
Status: DISCONTINUED | OUTPATIENT
Start: 2024-12-23 | End: 2024-12-27 | Stop reason: HOSPADM

## 2024-12-23 RX ORDER — FUROSEMIDE 10 MG/ML
40 INJECTION INTRAMUSCULAR; INTRAVENOUS ONCE
Status: DISCONTINUED | OUTPATIENT
Start: 2024-12-23 | End: 2024-12-27 | Stop reason: HOSPADM

## 2024-12-23 RX ORDER — SODIUM CHLORIDE 0.9 % (FLUSH) 0.9 %
5-40 SYRINGE (ML) INJECTION EVERY 12 HOURS SCHEDULED
Status: DISCONTINUED | OUTPATIENT
Start: 2024-12-23 | End: 2024-12-27 | Stop reason: HOSPADM

## 2024-12-23 RX ORDER — ACETAMINOPHEN 650 MG/1
650 SUPPOSITORY RECTAL EVERY 6 HOURS PRN
Status: DISCONTINUED | OUTPATIENT
Start: 2024-12-23 | End: 2024-12-27 | Stop reason: HOSPADM

## 2024-12-23 RX ORDER — MAGNESIUM SULFATE IN WATER 40 MG/ML
2000 INJECTION, SOLUTION INTRAVENOUS PRN
Status: DISCONTINUED | OUTPATIENT
Start: 2024-12-23 | End: 2024-12-27 | Stop reason: HOSPADM

## 2024-12-23 RX ADMIN — Medication 3 MG: at 23:32

## 2024-12-23 RX ADMIN — POLYETHYLENE GLYCOL 3350 17 G: 17 POWDER, FOR SOLUTION ORAL at 20:46

## 2024-12-23 SDOH — ECONOMIC STABILITY: FOOD INSECURITY: WITHIN THE PAST 12 MONTHS, YOU WORRIED THAT YOUR FOOD WOULD RUN OUT BEFORE YOU GOT MONEY TO BUY MORE.: NEVER TRUE

## 2024-12-23 SDOH — ECONOMIC STABILITY: FOOD INSECURITY: WITHIN THE PAST 12 MONTHS, THE FOOD YOU BOUGHT JUST DIDN'T LAST AND YOU DIDN'T HAVE MONEY TO GET MORE.: NEVER TRUE

## 2024-12-23 SDOH — ECONOMIC STABILITY: INCOME INSECURITY: HOW HARD IS IT FOR YOU TO PAY FOR THE VERY BASICS LIKE FOOD, HOUSING, MEDICAL CARE, AND HEATING?: NOT HARD AT ALL

## 2024-12-23 SDOH — SOCIAL STABILITY - SOCIAL INSECURITY: PROBLEM RELATED TO SOCIAL ENVIRONMENT, UNSPECIFIED: Z60.9

## 2024-12-23 ASSESSMENT — LIFESTYLE VARIABLES
HOW OFTEN DO YOU HAVE A DRINK CONTAINING ALCOHOL: MONTHLY OR LESS
HOW MANY STANDARD DRINKS CONTAINING ALCOHOL DO YOU HAVE ON A TYPICAL DAY: 1 OR 2

## 2024-12-23 ASSESSMENT — PAIN DESCRIPTION - PAIN TYPE: TYPE: ACUTE PAIN

## 2024-12-23 ASSESSMENT — PAIN DESCRIPTION - ORIENTATION: ORIENTATION: LOWER

## 2024-12-23 ASSESSMENT — PAIN DESCRIPTION - DESCRIPTORS: DESCRIPTORS: SORE

## 2024-12-23 ASSESSMENT — PAIN DESCRIPTION - FREQUENCY: FREQUENCY: INTERMITTENT

## 2024-12-23 ASSESSMENT — PAIN DESCRIPTION - LOCATION: LOCATION: BACK

## 2024-12-23 ASSESSMENT — PAIN SCALES - GENERAL
PAINLEVEL_OUTOF10: 0
PAINLEVEL_OUTOF10: 0

## 2024-12-23 ASSESSMENT — PAIN - FUNCTIONAL ASSESSMENT
PAIN_FUNCTIONAL_ASSESSMENT: ACTIVITIES ARE NOT PREVENTED
PAIN_FUNCTIONAL_ASSESSMENT: 0-10

## 2024-12-23 NOTE — PROGRESS NOTES
Subjective:      Patient ID: Ran Espinoza is a 89 y.o. male.    Chief Complaint   Patient presents with    Shortness of Breath     X weeks    Gait Problem     Unsteady x months    Wound Check     Check wound on right foot        Patient presents with:  Shortness of Breath: X weeks  Gait Problem: Unsteady x months  Wound Check: Check wound on right foot    Here for the above   He is noting some sob now going across the room for 2 week  No sob laying down or sitting  No cp     No passing blood  No abd pain   No cough no fever  Eating ok/fair     He is still at home  He has friends check on him     He noted some sore on the right foot for unknown time     In wheel chair     YOB: 1931    Date of Visit:  12/23/2024     -- Egg-Derived Products -- Other (See Comments)    --  headaches    Current Outpatient Medications:  aspirin 81 MG tablet, Take 1 tablet by mouth daily Takes once a week, Disp: , Rfl:   Ascorbic Acid (VITAMIN C) 500 MG tablet, Take 1 tablet by mouth daily, Disp: , Rfl:   Vitamin D (CHOLECALCIFEROL) 1000 UNITS CAPS capsule, Take 1 capsule by mouth daily, Disp: , Rfl:   Multiple Vitamins-Minerals (MULTI COMPLETE PO), Take 1 tablet by mouth daily., Disp: , Rfl:     No current facility-administered medications for this visit.      ---------------------------               12/23/24                      1047         ---------------------------   BP:          118/70         Site:    Left Upper Arm     Position:     Sitting        Cuff Size:  Medium Adult      Temp:   97.4 °F (36.3 °C)   TempSrc:    Temporal        SpO2:        (!) 88%                             I rechecked and got 87%  ---------------------------  There is no height or weight on file to calculate BMI.     Wt Readings from Last 3 Encounters:  04/02/24 : 64.9 kg (143 lb)  10/03/23 : 68.9 kg (152 lb)  04/03/23 : 69.9 kg (154 lb)    BP Readings from Last 3 Encounters:  12/23/24 : 118/70  04/02/24

## 2024-12-23 NOTE — CONSENT
Informed Consent for Blood Component Transfusion Note    I have discussed with the patient the rationale for blood component transfusion; its benefits in treating or preventing fatigue, organ damage, or death; and its risk which includes mild transfusion reactions, rare risk of blood borne infection, or more serious but rare reactions. I have discussed the alternatives to transfusion, including the risk and consequences of not receiving transfusion. The patient had an opportunity to ask questions and had agreed to proceed with transfusion of blood components.    Electronically signed by Abeba Pérez MD on 12/23/24 at 4:13 PM EST

## 2024-12-23 NOTE — PATIENT INSTRUCTIONS
I recommend going to the hospital for further   Check    Suspect he needs long term care after evaluation

## 2024-12-23 NOTE — ED PROVIDER NOTES
agreed with or any disagreements were addressed in the HPI.    REVIEW OF SYSTEMS    (2-9 systems for level 4, 10 or more for level 5)     Positives and pertinent negatives as per HPI.     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    GERD (gastroesophageal reflux disease)     mild    Heart murmur     Wears glasses     reading       SURGICAL HISTORY     Past Surgical History:   Procedure Laterality Date    ANKLE SURGERY Right 1985    COLONOSCOPY  6/4/2009    ok, dr acosta    ENDOSCOPY, COLON, DIAGNOSTIC  06/04/2009    hiatal hernia dr acosta    INGUINAL HERNIA REPAIR N/A 10/13/2017    TONSILLECTOMY AND ADENOIDECTOMY         CURRENTMEDICATIONS       Previous Medications    ASCORBIC ACID (VITAMIN C) 500 MG TABLET    Take 1 tablet by mouth daily    ASPIRIN 81 MG TABLET    Take 1 tablet by mouth daily Takes once a week    MULTIPLE VITAMINS-MINERALS (MULTI COMPLETE PO)    Take 1 tablet by mouth daily.    VITAMIN D (CHOLECALCIFEROL) 1000 UNITS CAPS CAPSULE    Take 1 capsule by mouth daily       ALLERGIES     Egg-derived products    FAMILYHISTORY       Family History   Problem Relation Age of Onset    Arthritis Mother     Heart Disease Father         pacemaker        SOCIAL HISTORY       Social History     Tobacco Use    Smoking status: Never    Smokeless tobacco: Never   Substance Use Topics    Alcohol use: Yes     Comment: minimal beer. Pt active plays basketball once a week    Drug use: No       SCREENINGS      Radha Coma Scale  Eye Opening: Spontaneous  Best Verbal Response: Oriented  Best Motor Response: Obeys commands  Radha Coma Scale Score: 15           CIWA Assessment  BP: 133/82  Pulse: 79          PHYSICAL EXAM    (up to 7 for level 4, 8 or more for level 5)     ED Triage Vitals [12/23/24 1239]   BP Systolic BP Percentile Diastolic BP Percentile Temp Temp Source Pulse Respirations SpO2   (!) 159/84 -- --

## 2024-12-23 NOTE — ED TRIAGE NOTES
Pt arrives to ED via EMS from PCP office with c/o SOB. Per EMS, pt SpO2 was 85-88% on RA at office. Pt arrives with 2 L O2 via nasal cannula satting at 100%. Pt states that he has been experiencing SOB for the past week, worse on exertion. Pt denies fever/chills, cough. Pt also endorses lower back pain that has worsened the past 2 weeks. Pt denies urinary symptoms Pt AAO x 4. VSS.

## 2024-12-23 NOTE — H&P
[employer] Other (See Comments)     headaches     Fam HX: family history includes Arthritis in his mother; Heart Disease in his father.  Soc HX:   Social History     Socioeconomic History    Marital status:      Spouse name: None    Number of children: 2    Years of education: None    Highest education level: None   Occupational History    Occupation: Retired   Tobacco Use    Smoking status: Never    Smokeless tobacco: Never   Substance and Sexual Activity    Alcohol use: Yes     Comment: minimal beer. Pt active plays basketball once a week    Drug use: No    Sexual activity: Never     Social Determinants of Health     Financial Resource Strain: Low Risk  (12/23/2024)    Overall Financial Resource Strain (CARDIA)     Difficulty of Paying Living Expenses: Not hard at all   Food Insecurity: No Food Insecurity (12/23/2024)    Hunger Vital Sign     Worried About Running Out of Food in the Last Year: Never true     Ran Out of Food in the Last Year: Never true   Transportation Needs: Unknown (12/23/2024)    PRAPARE - Transportation     Lack of Transportation (Non-Medical): No   Housing Stability: Unknown (12/23/2024)    Housing Stability Vital Sign     Homeless in the Last Year: No     Medications Prior to Admission     Prior to Admission medications    Medication Sig Start Date End Date Taking? Authorizing Provider   aspirin 81 MG tablet Take 1 tablet by mouth daily   Yes Jovanna Pierre MD   Ascorbic Acid (VITAMIN C) 500 MG tablet Take 1 tablet by mouth daily   Yes Jovanna Pierre MD   Vitamin D (CHOLECALCIFEROL) 1000 UNITS CAPS capsule Take 1 capsule by mouth daily   Yes Jovanna Pierre MD   Multiple Vitamins-Minerals (MULTI COMPLETE PO) Take 1 tablet by mouth daily.   Yes Jovanna Pierre MD       Physical Exam:      Physical Exam     General: Awake, alert and oriented, NAD  Eyes: EOMI  ENT: neck supple  Cardiovascular: S1S2 present, regular rate and rhythm, no

## 2024-12-23 NOTE — ED NOTES
Assumed care of pt at this time; pt laying in bed resting comfortably, on 2L nc oxygen, pt easily awakened as this nurse enters room, call light within reach, bed wheels locked and at lowest position with bilateral side rails up with for safety, pt has no questions or any needs at this time.

## 2024-12-24 LAB
ALBUMIN SERPL-MCNC: 3.2 G/DL (ref 3.4–5)
ALBUMIN/GLOB SERPL: 1.7 {RATIO} (ref 1.1–2.2)
ALP SERPL-CCNC: 78 U/L (ref 40–129)
ALT SERPL-CCNC: 13 U/L (ref 10–40)
ANION GAP SERPL CALCULATED.3IONS-SCNC: 9 MMOL/L (ref 3–16)
AST SERPL-CCNC: 28 U/L (ref 15–37)
BACTERIA URNS QL MICRO: NORMAL /HPF
BASOPHILS # BLD: 0 K/UL (ref 0–0.2)
BASOPHILS NFR BLD: 0.4 %
BILIRUB SERPL-MCNC: 0.7 MG/DL (ref 0–1)
BILIRUB UR QL STRIP.AUTO: NEGATIVE
BUN SERPL-MCNC: 50 MG/DL (ref 7–20)
CALCIUM SERPL-MCNC: 8.1 MG/DL (ref 8.3–10.6)
CHLORIDE SERPL-SCNC: 112 MMOL/L (ref 99–110)
CLARITY UR: CLEAR
CO2 SERPL-SCNC: 23 MMOL/L (ref 21–32)
COLOR UR: YELLOW
CREAT SERPL-MCNC: 1.2 MG/DL (ref 0.8–1.3)
DEPRECATED RDW RBC AUTO: 18 % (ref 12.4–15.4)
EOSINOPHIL # BLD: 0 K/UL (ref 0–0.6)
EOSINOPHIL NFR BLD: 0.8 %
EPI CELLS #/AREA URNS AUTO: 0 /HPF (ref 0–5)
GFR SERPLBLD CREATININE-BSD FMLA CKD-EPI: 56 ML/MIN/{1.73_M2}
GLUCOSE SERPL-MCNC: 103 MG/DL (ref 70–99)
GLUCOSE UR STRIP.AUTO-MCNC: NEGATIVE MG/DL
HCT VFR BLD AUTO: 22 % (ref 40.5–52.5)
HCT VFR BLD AUTO: 24 % (ref 40.5–52.5)
HGB BLD-MCNC: 7.2 G/DL (ref 13.5–17.5)
HGB BLD-MCNC: 8 G/DL (ref 13.5–17.5)
HGB UR QL STRIP.AUTO: NEGATIVE
HYALINE CASTS #/AREA URNS AUTO: 1 /LPF (ref 0–8)
KETONES UR STRIP.AUTO-MCNC: NEGATIVE MG/DL
LEUKOCYTE ESTERASE UR QL STRIP.AUTO: NEGATIVE
LYMPHOCYTES # BLD: 0.3 K/UL (ref 1–5.1)
LYMPHOCYTES NFR BLD: 5.5 %
MCH RBC QN AUTO: 30.4 PG (ref 26–34)
MCHC RBC AUTO-ENTMCNC: 32.6 G/DL (ref 31–36)
MCV RBC AUTO: 93.2 FL (ref 80–100)
MONOCYTES # BLD: 0.3 K/UL (ref 0–1.3)
MONOCYTES NFR BLD: 6.7 %
NEUTROPHILS # BLD: 4 K/UL (ref 1.7–7.7)
NEUTROPHILS NFR BLD: 86.6 %
NITRITE UR QL STRIP.AUTO: NEGATIVE
PH UR STRIP.AUTO: 5.5 [PH] (ref 5–8)
PLATELET # BLD AUTO: 188 K/UL (ref 135–450)
PMV BLD AUTO: 7.4 FL (ref 5–10.5)
POTASSIUM SERPL-SCNC: 4.3 MMOL/L (ref 3.5–5.1)
PROT SERPL-MCNC: 5.1 G/DL (ref 6.4–8.2)
PROT UR STRIP.AUTO-MCNC: ABNORMAL MG/DL
RBC # BLD AUTO: 2.36 M/UL (ref 4.2–5.9)
RBC CLUMPS #/AREA URNS AUTO: 0 /HPF (ref 0–4)
SODIUM SERPL-SCNC: 144 MMOL/L (ref 136–145)
SP GR UR STRIP.AUTO: 1.02 (ref 1–1.03)
UA COMPLETE W REFLEX CULTURE PNL UR: ABNORMAL
UA DIPSTICK W REFLEX MICRO PNL UR: YES
URN SPEC COLLECT METH UR: ABNORMAL
UROBILINOGEN UR STRIP-ACNC: 1 E.U./DL
WBC # BLD AUTO: 4.6 K/UL (ref 4–11)
WBC #/AREA URNS AUTO: 0 /HPF (ref 0–5)

## 2024-12-24 PROCEDURE — 97162 PT EVAL MOD COMPLEX 30 MIN: CPT

## 2024-12-24 PROCEDURE — 97530 THERAPEUTIC ACTIVITIES: CPT

## 2024-12-24 PROCEDURE — 36415 COLL VENOUS BLD VENIPUNCTURE: CPT

## 2024-12-24 PROCEDURE — 97166 OT EVAL MOD COMPLEX 45 MIN: CPT

## 2024-12-24 PROCEDURE — 85018 HEMOGLOBIN: CPT

## 2024-12-24 PROCEDURE — 80053 COMPREHEN METABOLIC PANEL: CPT

## 2024-12-24 PROCEDURE — 6370000000 HC RX 637 (ALT 250 FOR IP): Performed by: HOSPITALIST

## 2024-12-24 PROCEDURE — 94760 N-INVAS EAR/PLS OXIMETRY 1: CPT

## 2024-12-24 PROCEDURE — 97535 SELF CARE MNGMENT TRAINING: CPT

## 2024-12-24 PROCEDURE — 1200000000 HC SEMI PRIVATE

## 2024-12-24 PROCEDURE — 6360000002 HC RX W HCPCS: Performed by: INTERNAL MEDICINE

## 2024-12-24 PROCEDURE — 85025 COMPLETE CBC W/AUTO DIFF WBC: CPT

## 2024-12-24 PROCEDURE — 85014 HEMATOCRIT: CPT

## 2024-12-24 PROCEDURE — 81001 URINALYSIS AUTO W/SCOPE: CPT

## 2024-12-24 PROCEDURE — 2500000003 HC RX 250 WO HCPCS: Performed by: HOSPITALIST

## 2024-12-24 RX ADMIN — IRON SUCROSE 200 MG: 20 INJECTION, SOLUTION INTRAVENOUS at 10:07

## 2024-12-24 RX ADMIN — ACETAMINOPHEN 650 MG: 325 TABLET ORAL at 10:07

## 2024-12-24 RX ADMIN — SODIUM CHLORIDE, PRESERVATIVE FREE 10 ML: 5 INJECTION INTRAVENOUS at 20:36

## 2024-12-24 RX ADMIN — SODIUM CHLORIDE, PRESERVATIVE FREE 10 ML: 5 INJECTION INTRAVENOUS at 09:14

## 2024-12-24 ASSESSMENT — PAIN SCALES - GENERAL
PAINLEVEL_OUTOF10: 2
PAINLEVEL_OUTOF10: 1

## 2024-12-24 ASSESSMENT — PAIN DESCRIPTION - PAIN TYPE: TYPE: CHRONIC PAIN

## 2024-12-24 ASSESSMENT — PAIN - FUNCTIONAL ASSESSMENT: PAIN_FUNCTIONAL_ASSESSMENT: ACTIVITIES ARE NOT PREVENTED

## 2024-12-24 ASSESSMENT — PAIN DESCRIPTION - DESCRIPTORS: DESCRIPTORS: SORE

## 2024-12-24 ASSESSMENT — PAIN DESCRIPTION - LOCATION: LOCATION: ANKLE

## 2024-12-24 ASSESSMENT — PAIN DESCRIPTION - ORIENTATION: ORIENTATION: RIGHT

## 2024-12-24 ASSESSMENT — PAIN DESCRIPTION - ONSET: ONSET: ON-GOING

## 2024-12-24 ASSESSMENT — PAIN DESCRIPTION - FREQUENCY: FREQUENCY: INTERMITTENT

## 2024-12-24 NOTE — DISCHARGE INSTRUCTIONS
Extra Heart Failure Education/ Tools/ Resources:     https://Memonic.com/publication/?c=025495   --- this is American Heart Association interactive Healthier Living with Heart Failure guidebook.  Please click hyperlink or copy / paste link into search bar. The QR Code is also available below. Use your mouse to scroll through the pages.  Lots of information about weight monitoring, diet tips, activity, meds, etc    Heart Failure Tools and Resources QR Code is below. It includes multiple resources to include symptom tracker, med tracker, further HF info, and access to a HF Support Network online Community    HF [place] Ton  -- this is a free smart phone ton available for iPhone and Android download.  Use your phone to track sodium / fluid intake, zone tool symptom tracking, weights, medications, etc. Click on this hyperlink  HF [place] Ton   for QR code for easy download or the link is also found in the below HF Tools and Resources.      DASH (Dietary Approach to Stop Hypertension) diet --  https://www.nhlbi.nih.gov/education/dash-eating-plan -- this diet is a flexible eating plan that promotes heart healthy eating style.  Click on hyperlink or copy / paste link into search bar.  Lots of low sodium recipes and tips.    https://www.EPV SOLAR.Joosy/recipes  -- more free recipes

## 2024-12-24 NOTE — CARE COORDINATION
Case Management Assessment  Initial Evaluation    Date/Time of Evaluation: 12/24/2024 1:55 PM  Assessment Completed by: SILAS Kaye    If patient is discharged prior to next notation, then this note serves as note for discharge by case management.    Patient Name: Ran Espinoza                   YOB: 1931  Diagnosis: Dyspnea on exertion [R06.09]  Anemia [D64.9]  Anemia, unspecified type [D64.9]                   Date / Time: 12/23/2024 12:27 PM    Patient Admission Status: Inpatient   Readmission Risk (Low < 19, Mod (19-27), High > 27): Readmission Risk Score: 13.8    Current PCP: Wiliam Mahmood MD  PCP verified by CM? (P) Yes    Chart Reviewed: Yes      History Provided by: (P) Patient  Patient Orientation: (P) Alert and Oriented, Person    Patient Cognition: (P) Alert    Hospitalization in the last 30 days (Readmission):  No    If yes, Readmission Assessment in  Navigator will be completed.    Advance Directives:      Code Status: DNR-CCA   Patient's Primary Decision Maker is: (P) Legal Next of Kin    Primary Decision Maker: Bailee Espinoza - Spouse - [phone]    Discharge Planning:    Patient lives with: (P) Spouse/Significant Other, Alone Type of Home: (P) House  Primary Care Giver: (P) Self  Patient Support Systems include: (P) Family Members, Children   Current Financial resources: (P) None  Current community resources: (P) None  Current services prior to admission: (P) Durable Medical Equipment            Current DME: (P) Walker            Type of Home Care services:  (P) None    ADLS  Prior functional level: (P) Independent in ADLs/IADLs, Assistance with the following:, Mobility  Current functional level: (P) Assistance with the following:, Independent in ADLs/IADLs, Mobility    PT AM-PAC: 20 /24  OT AM-PAC: 17 /24    Family can provide assistance at DC: (P) Yes  Would you like Case Management to discuss the discharge plan with any other family members/significant others, and if

## 2024-12-24 NOTE — DISCHARGE INSTR - COC
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    Mixed hyperlipidemia E78.2    Right inguinal hernia K40.90    Anemia D64.9       Isolation/Infection:   Isolation            No Isolation          Patient Infection Status       None to display                     Nurse Assessment:  Last Vital Signs: /68   Pulse 79   Temp 98.1 °F (36.7 °C) (Oral)   Resp 16   Ht 1.702 m (5' 7\")   Wt 61.3 kg (135 lb 2.3 oz)   SpO2 96%   BMI 21.17 kg/m²     Last documented pain score (0-10 scale): Pain Level: 2  Last Weight:   Wt Readings from Last 1 Encounters:   12/24/24 61.3 kg (135 lb 2.3 oz)     Mental Status:  oriented and alert    IV Access:  - None    Nursing Mobility/ADLs:  Walking   Assisted  Transfer  Independent  Bathing  Assisted  Dressing  Assisted  Toileting  Independent  Feeding  Independent  Med Admin  Assisted  Med Delivery   whole    Wound Care Documentation and Therapy:  Incision 10/13/17 Abdomen Mid (Active)   Number of days: 2628        Elimination:  Continence:   Bowel: Yes  Bladder: Yes  Urinary Catheter: None   Colostomy/Ileostomy/Ileal Conduit: No       Date of Last BM: 12/25/24    Intake/Output Summary (Last 24 hours) at 12/24/2024 1046  Last data filed at 12/24/2024 0727  Gross per 24 hour   Intake 649.58 ml   Output 500 ml   Net 149.58 ml     I/O last 3 completed shifts:  In: 649.6 [Blood:649.6]  Out: 200 [Urine:200]    Safety Concerns:     At Risk for Falls    Impairments/Disabilities:      None    Nutrition Therapy:  Current Nutrition Therapy:   - Oral Diet:  General    Routes of Feeding: Oral  Liquids: No Restrictions  Daily Fluid Restriction: no  Last Modified Barium Swallow with Video (Video Swallowing Test): not done    Treatments at the Time of Hospital Discharge:   Respiratory Treatments:   Oxygen Therapy:  is not on home oxygen

## 2024-12-25 LAB
ANION GAP SERPL CALCULATED.3IONS-SCNC: 7 MMOL/L (ref 3–16)
BASOPHILS # BLD: 0 K/UL (ref 0–0.2)
BASOPHILS NFR BLD: 0.4 %
BUN SERPL-MCNC: 35 MG/DL (ref 7–20)
CALCIUM SERPL-MCNC: 8.2 MG/DL (ref 8.3–10.6)
CHLORIDE SERPL-SCNC: 106 MMOL/L (ref 99–110)
CO2 SERPL-SCNC: 25 MMOL/L (ref 21–32)
CREAT SERPL-MCNC: 1 MG/DL (ref 0.8–1.3)
DEPRECATED RDW RBC AUTO: 17.4 % (ref 12.4–15.4)
EOSINOPHIL # BLD: 0.1 K/UL (ref 0–0.6)
EOSINOPHIL NFR BLD: 1.2 %
GFR SERPLBLD CREATININE-BSD FMLA CKD-EPI: 70 ML/MIN/{1.73_M2}
GLUCOSE SERPL-MCNC: 114 MG/DL (ref 70–99)
HCT VFR BLD AUTO: 25 % (ref 40.5–52.5)
HGB BLD-MCNC: 8 G/DL (ref 13.5–17.5)
LYMPHOCYTES # BLD: 0.3 K/UL (ref 1–5.1)
LYMPHOCYTES NFR BLD: 4.2 %
MCH RBC QN AUTO: 30.2 PG (ref 26–34)
MCHC RBC AUTO-ENTMCNC: 31.9 G/DL (ref 31–36)
MCV RBC AUTO: 94.7 FL (ref 80–100)
MONOCYTES # BLD: 0.4 K/UL (ref 0–1.3)
MONOCYTES NFR BLD: 6.6 %
NEUTROPHILS # BLD: 5.3 K/UL (ref 1.7–7.7)
NEUTROPHILS NFR BLD: 87.6 %
PLATELET # BLD AUTO: 198 K/UL (ref 135–450)
PMV BLD AUTO: 7.4 FL (ref 5–10.5)
POTASSIUM SERPL-SCNC: 4.2 MMOL/L (ref 3.5–5.1)
RBC # BLD AUTO: 2.64 M/UL (ref 4.2–5.9)
SODIUM SERPL-SCNC: 138 MMOL/L (ref 136–145)
WBC # BLD AUTO: 6 K/UL (ref 4–11)

## 2024-12-25 PROCEDURE — 6370000000 HC RX 637 (ALT 250 FOR IP): Performed by: HOSPITALIST

## 2024-12-25 PROCEDURE — 6360000002 HC RX W HCPCS: Performed by: INTERNAL MEDICINE

## 2024-12-25 PROCEDURE — 80048 BASIC METABOLIC PNL TOTAL CA: CPT

## 2024-12-25 PROCEDURE — 87070 CULTURE OTHR SPECIMN AEROBIC: CPT

## 2024-12-25 PROCEDURE — 94760 N-INVAS EAR/PLS OXIMETRY 1: CPT

## 2024-12-25 PROCEDURE — 87205 SMEAR GRAM STAIN: CPT

## 2024-12-25 PROCEDURE — 87186 SC STD MICRODIL/AGAR DIL: CPT

## 2024-12-25 PROCEDURE — 1200000000 HC SEMI PRIVATE

## 2024-12-25 PROCEDURE — 87077 CULTURE AEROBIC IDENTIFY: CPT

## 2024-12-25 PROCEDURE — 6370000000 HC RX 637 (ALT 250 FOR IP): Performed by: REGISTERED NURSE

## 2024-12-25 PROCEDURE — 86403 PARTICLE AGGLUT ANTBDY SCRN: CPT

## 2024-12-25 PROCEDURE — 85025 COMPLETE CBC W/AUTO DIFF WBC: CPT

## 2024-12-25 PROCEDURE — 36415 COLL VENOUS BLD VENIPUNCTURE: CPT

## 2024-12-25 PROCEDURE — 2500000003 HC RX 250 WO HCPCS: Performed by: HOSPITALIST

## 2024-12-25 RX ORDER — HYDROXYZINE PAMOATE 25 MG/1
25 CAPSULE ORAL ONCE
Status: COMPLETED | OUTPATIENT
Start: 2024-12-25 | End: 2024-12-25

## 2024-12-25 RX ORDER — FERROUS SULFATE 325(65) MG
325 TABLET ORAL 2 TIMES DAILY
Qty: 60 TABLET | Refills: 0 | Status: SHIPPED | OUTPATIENT
Start: 2024-12-25

## 2024-12-25 RX ADMIN — ACETAMINOPHEN 650 MG: 325 TABLET ORAL at 10:37

## 2024-12-25 RX ADMIN — HYDROXYZINE PAMOATE 25 MG: 25 CAPSULE ORAL at 20:54

## 2024-12-25 RX ADMIN — SODIUM CHLORIDE, PRESERVATIVE FREE 10 ML: 5 INJECTION INTRAVENOUS at 20:20

## 2024-12-25 RX ADMIN — IRON SUCROSE 200 MG: 20 INJECTION, SOLUTION INTRAVENOUS at 10:31

## 2024-12-25 RX ADMIN — SODIUM CHLORIDE, PRESERVATIVE FREE 10 ML: 5 INJECTION INTRAVENOUS at 10:31

## 2024-12-25 RX ADMIN — Medication 3 MG: at 20:20

## 2024-12-25 ASSESSMENT — PAIN SCALES - GENERAL
PAINLEVEL_OUTOF10: 1
PAINLEVEL_OUTOF10: 0
PAINLEVEL_OUTOF10: 0
PAINLEVEL_OUTOF10: 1
PAINLEVEL_OUTOF10: 3

## 2024-12-25 NOTE — CARE COORDINATION
Discharge Planning:  DELMIS spoke with pts Tiffany leroy.  SW was unable to reach pts Alicia leroy or pts spouse.  Tiffany expressed concerns about pt returning home as she is reporting that pt is not eating, declining all help in the home and is showing signs of memory impairment.  Tiffany stated that pts spouse resides at [facility] and they are in the process of getting pt moved into the assisted living as well.  Tiffany stated she is hopeful to have this arranged by 12/26.    DELMIS met with pt to discuss d/c plans.  Pt stated he is agreeable to moving into [facility] with his spouse.     DELMIS contacted Samara at [facility].  Message left requesting a return call.     Perfectserve to Dr. Arreola to provide an update.    PLAN: D/c plan delayed as family now wants pt to move into Assisted Living at [facility] with his spouse.  Spouse already living there.  Message left for [facility].    SILAS Haro \[facility]\""  [phone]  Electronically signed by SILAS Lou on 12/25/2024 at 11:35 AM

## 2024-12-26 LAB
ANION GAP SERPL CALCULATED.3IONS-SCNC: 7 MMOL/L (ref 3–16)
BASOPHILS # BLD: 0 K/UL (ref 0–0.2)
BASOPHILS NFR BLD: 0.5 %
BUN SERPL-MCNC: 28 MG/DL (ref 7–20)
CALCIUM SERPL-MCNC: 8.1 MG/DL (ref 8.3–10.6)
CHLORIDE SERPL-SCNC: 107 MMOL/L (ref 99–110)
CO2 SERPL-SCNC: 24 MMOL/L (ref 21–32)
CREAT SERPL-MCNC: 0.9 MG/DL (ref 0.8–1.3)
DEPRECATED RDW RBC AUTO: 17 % (ref 12.4–15.4)
EOSINOPHIL # BLD: 0.1 K/UL (ref 0–0.6)
EOSINOPHIL NFR BLD: 2.4 %
GFR SERPLBLD CREATININE-BSD FMLA CKD-EPI: 79 ML/MIN/{1.73_M2}
GLUCOSE SERPL-MCNC: 113 MG/DL (ref 70–99)
HCT VFR BLD AUTO: 22.9 % (ref 40.5–52.5)
HGB BLD-MCNC: 7.6 G/DL (ref 13.5–17.5)
LYMPHOCYTES # BLD: 0.3 K/UL (ref 1–5.1)
LYMPHOCYTES NFR BLD: 5.6 %
MCH RBC QN AUTO: 31 PG (ref 26–34)
MCHC RBC AUTO-ENTMCNC: 33.1 G/DL (ref 31–36)
MCV RBC AUTO: 93.6 FL (ref 80–100)
MONOCYTES # BLD: 0.4 K/UL (ref 0–1.3)
MONOCYTES NFR BLD: 7.5 %
NEUTROPHILS # BLD: 4.8 K/UL (ref 1.7–7.7)
NEUTROPHILS NFR BLD: 84 %
PLATELET # BLD AUTO: 180 K/UL (ref 135–450)
PMV BLD AUTO: 7.5 FL (ref 5–10.5)
POTASSIUM SERPL-SCNC: 4.2 MMOL/L (ref 3.5–5.1)
RBC # BLD AUTO: 2.45 M/UL (ref 4.2–5.9)
SODIUM SERPL-SCNC: 138 MMOL/L (ref 136–145)
WBC # BLD AUTO: 5.7 K/UL (ref 4–11)

## 2024-12-26 PROCEDURE — 94760 N-INVAS EAR/PLS OXIMETRY 1: CPT

## 2024-12-26 PROCEDURE — 36415 COLL VENOUS BLD VENIPUNCTURE: CPT

## 2024-12-26 PROCEDURE — 6370000000 HC RX 637 (ALT 250 FOR IP): Performed by: REGISTERED NURSE

## 2024-12-26 PROCEDURE — 1200000000 HC SEMI PRIVATE

## 2024-12-26 PROCEDURE — 97535 SELF CARE MNGMENT TRAINING: CPT

## 2024-12-26 PROCEDURE — 6360000002 HC RX W HCPCS: Performed by: INTERNAL MEDICINE

## 2024-12-26 PROCEDURE — 2500000003 HC RX 250 WO HCPCS: Performed by: HOSPITALIST

## 2024-12-26 PROCEDURE — 80048 BASIC METABOLIC PNL TOTAL CA: CPT

## 2024-12-26 PROCEDURE — 97530 THERAPEUTIC ACTIVITIES: CPT

## 2024-12-26 PROCEDURE — 85025 COMPLETE CBC W/AUTO DIFF WBC: CPT

## 2024-12-26 RX ORDER — HYDROXYZINE PAMOATE 25 MG/1
25 CAPSULE ORAL ONCE
Status: COMPLETED | OUTPATIENT
Start: 2024-12-26 | End: 2024-12-26

## 2024-12-26 RX ADMIN — SODIUM CHLORIDE, PRESERVATIVE FREE 10 ML: 5 INJECTION INTRAVENOUS at 08:03

## 2024-12-26 RX ADMIN — HYDROXYZINE PAMOATE 25 MG: 25 CAPSULE ORAL at 23:07

## 2024-12-26 RX ADMIN — IRON SUCROSE 200 MG: 20 INJECTION, SOLUTION INTRAVENOUS at 08:03

## 2024-12-26 RX ADMIN — SODIUM CHLORIDE, PRESERVATIVE FREE 10 ML: 5 INJECTION INTRAVENOUS at 21:16

## 2024-12-26 ASSESSMENT — PAIN SCALES - GENERAL: PAINLEVEL_OUTOF10: 0

## 2024-12-26 NOTE — CARE COORDINATION
SW received call from Martita Rocha at [phone] stating that she received the referral and she has passed on to the team for review. She stated that they do have space but he is waiting on a final review.    SW received voicemail from Lyn Easley at [facility] at [phone] stating that she received the referral and has passed on to the team for review.     Respectfully submitted,    Twila ROSEN, BHUMI-S  [facility]   [phone]    Electronically signed by SILAS Maldonado, ZINA on 12/26/2024 at 2:15 PM

## 2024-12-26 NOTE — CARE COORDINATION
12/26/24 1153   3 Day Waiver Checklist   The patient is prospectively assigned as an [facility] beneficiary? Yes   Does the patient reside in a SNF or other long-term care setting? No   Medically stable? Yes   Does the patient require inpatient or further inpatient hospital evaluation or treatment? No   Has certain and confirmed diagnoses? Yes   Has an identified skilled nursing or rehabilitation need that they cannot receive as an outpatient? Yes   Has been evaluated and approved for admission to the SNF within 3 days prior to the SNF admission by an [facility] physician? Yes   Name of the provider who directly evaluated the benificiary Jesika Interiano MD   Date of Physician encounter reviewed for verification  12/26/24   ACO Provider Signoff: For purposes of admission to SNF under the [facility] 3-day waiver, I have reviewed the evaluation performed by the provider and agree that the beneficiary requires admission to SNF.  Yes   ACO Provider Signoff: Name of the provider who directly evaluated the beneficiary:  MAGDALENE Olson        will present family with options for SNF upon arrival.     Respectfully submitted,    MAGDALENE Galvin  [facility]   [phone]    Electronically signed by SILAS Maldonado LSW on 12/26/2024 at 11:54 AM

## 2024-12-26 NOTE — CARE COORDINATION
SW reached back out to [facility] admissions staff. We are currently on hold.    Respectfully submitted,    Twila ROSEN, BHUMIS  [facility]   [phone]    Electronically signed by SILAS Maldonado, MATEUSW on 12/26/2024 at 11:18 AM

## 2024-12-26 NOTE — CARE COORDINATION
The Plan for Transition of Care is related to the following treatment goals:     SNF placement at 3-5 times per week.    The Patient and/or patient representative  was provided with a choice of provider  [x] Yes [] Noand agrees   with the discharge plan. [x] Yes [] No    Freedom of choice list was provided with basic dialogue that supports the patient's individualized plan of care/goals, treatment preferences and shares the quality data associated with the providers. [x] Yes [] No    SW spoke to daughter Tiffany who reports that she resides in [place]. She would like to defer decision making on final facility to sister Alicia who is currently en route.     DELMIS did advise that [facility] won't be able to take him skilled because he has less than 3 midnights. DELMIS will present sister Alicia with 3DW list upon arrival.     SW checked room and she is not yet on site. DELMIS will check back after lunch.     Respectfully submitted,    MAGDALENE Galvin  [facility]   [phone]    Electronically signed by SILAS Maldonado, ZINA on 12/26/2024 at 12:27 PM

## 2024-12-26 NOTE — CARE COORDINATION
SW reached out the following providers after sending referrals    \[facility]\""-Lyn Easley, covering for Lyn Mckeon in admissions. [phone]. SW left message requesting a return call.     Eduardo Davies-Martita Rocha [phone]. SW left message requesting a return call.     [facility] in admissions [phone]. SW reached out to her today and they are currently full. Family was aware that this was likely. SW thanked them for taking the time to review this referral and updated epic to reflect.    Respectfully submitted,    Twila ROSEN, MAGDALENE  [facility]   [phone]    Electronically signed by SILAS Maldonado, MATEUSW on 12/26/2024 at 1:58 PM

## 2024-12-26 NOTE — CARE COORDINATION
12/26/24 0826   Avoidable Days   Community/External       SNF  (Assisted living full.)       Respectfully submitted,    Twila ROSEN, MAGDALENE  [facility]   [phone]    Electronically signed by SILAS Maldonado, LSW on 12/26/2024 at 2:18 PM

## 2024-12-26 NOTE — CARE COORDINATION
SW placed phone call back to [facility] advising that his wife is currently in a room that only fits one person. She is on a waitlist for a larger room. One came available 2 weeks ago and she declined it.     She asked if he qualified for skilled placement. DELMIS advised that because he's had less than 3 midnights he won't be able to transition to [facility] skilled immediately after discharge.     SW will speak with family about the 3 day waiver program. He is a [facility] patient so he qualifies.     DELMIS thanked them for taking the time to review this referral.     Respectfully submitted,    Twila ROSEN, BHUMI-S  [facility]   [phone]    Electronically signed by SILAS Maldonado, ZINA on 12/26/2024 at 11:45 AM

## 2024-12-26 NOTE — CARE COORDINATION
Discharge order noted. DELMIS reached out to [facility] this morning to clarify Avoidable day entered for yesterday.     DELMIS spoke to Gustavo in admissions who was off for the holiday. She stated that we never sent clinical info. They will review and call momentarily with a decision. Gustavo can be reached at [phone].    Respectfully submitted,    Twila ROSEN, BHUMIS  [facility]   [phone]    Electronically signed by SILAS Maldonado, ZINA on 12/26/2024 at 8:23 AM

## 2024-12-27 ENCOUNTER — TELEPHONE (OUTPATIENT)
Dept: FAMILY MEDICINE CLINIC | Age: 88
End: 2024-12-27

## 2024-12-27 VITALS
DIASTOLIC BLOOD PRESSURE: 88 MMHG | HEART RATE: 58 BPM | RESPIRATION RATE: 18 BRPM | HEIGHT: 67 IN | WEIGHT: 136.91 LBS | OXYGEN SATURATION: 96 % | SYSTOLIC BLOOD PRESSURE: 151 MMHG | TEMPERATURE: 97.3 F | BODY MASS INDEX: 21.49 KG/M2

## 2024-12-27 LAB
ANION GAP SERPL CALCULATED.3IONS-SCNC: 8 MMOL/L (ref 3–16)
BASOPHILS # BLD: 0 K/UL (ref 0–0.2)
BASOPHILS NFR BLD: 0.5 %
BUN SERPL-MCNC: 32 MG/DL (ref 7–20)
CALCIUM SERPL-MCNC: 8 MG/DL (ref 8.3–10.6)
CHLORIDE SERPL-SCNC: 106 MMOL/L (ref 99–110)
CO2 SERPL-SCNC: 23 MMOL/L (ref 21–32)
CREAT SERPL-MCNC: 1 MG/DL (ref 0.8–1.3)
DEPRECATED RDW RBC AUTO: 16.4 % (ref 12.4–15.4)
EOSINOPHIL # BLD: 0.1 K/UL (ref 0–0.6)
EOSINOPHIL NFR BLD: 2.6 %
GFR SERPLBLD CREATININE-BSD FMLA CKD-EPI: 70 ML/MIN/{1.73_M2}
GLUCOSE SERPL-MCNC: 86 MG/DL (ref 70–99)
HCT VFR BLD AUTO: 25.2 % (ref 40.5–52.5)
HGB BLD-MCNC: 8.3 G/DL (ref 13.5–17.5)
LYMPHOCYTES # BLD: 0.3 K/UL (ref 1–5.1)
LYMPHOCYTES NFR BLD: 5.7 %
MCH RBC QN AUTO: 30.8 PG (ref 26–34)
MCHC RBC AUTO-ENTMCNC: 33 G/DL (ref 31–36)
MCV RBC AUTO: 93.5 FL (ref 80–100)
MONOCYTES # BLD: 0.3 K/UL (ref 0–1.3)
MONOCYTES NFR BLD: 6.3 %
NEUTROPHILS # BLD: 4.7 K/UL (ref 1.7–7.7)
NEUTROPHILS NFR BLD: 84.9 %
PLATELET # BLD AUTO: 193 K/UL (ref 135–450)
PMV BLD AUTO: 7.7 FL (ref 5–10.5)
POTASSIUM SERPL-SCNC: 4.6 MMOL/L (ref 3.5–5.1)
RBC # BLD AUTO: 2.69 M/UL (ref 4.2–5.9)
SODIUM SERPL-SCNC: 137 MMOL/L (ref 136–145)
WBC # BLD AUTO: 5.6 K/UL (ref 4–11)

## 2024-12-27 PROCEDURE — 97530 THERAPEUTIC ACTIVITIES: CPT

## 2024-12-27 PROCEDURE — 6370000000 HC RX 637 (ALT 250 FOR IP): Performed by: INTERNAL MEDICINE

## 2024-12-27 PROCEDURE — 36415 COLL VENOUS BLD VENIPUNCTURE: CPT

## 2024-12-27 PROCEDURE — 80048 BASIC METABOLIC PNL TOTAL CA: CPT

## 2024-12-27 PROCEDURE — 85025 COMPLETE CBC W/AUTO DIFF WBC: CPT

## 2024-12-27 RX ADMIN — AMOXICILLIN AND CLAVULANATE POTASSIUM 1 TABLET: 875; 125 TABLET, FILM COATED ORAL at 14:10

## 2024-12-27 NOTE — CARE COORDINATION
DELMIS faxed AVS to [facility] at [phone].    Respectfully submitted,    Twila Oliveira-Frank ROSEN, BHUMIS  [facility]   [phone]    Electronically signed by SILAS Maldonado, LSW on 12/27/2024 at 4:16 PM

## 2024-12-27 NOTE — CARE COORDINATION
DELMIS left message for Martita Rocha in admissions at [facility] today [phone].    DELMIS left message for Lyn Mckeon in admissions at [facility] today. [phone].    Respectfully submitted,    Twila ROSEN, MAGDALENE  [facility]   [phone]    Electronically signed by SILAS Maldonado, ZINA on 12/27/2024 at 8:46 AM

## 2024-12-27 NOTE — CARE COORDINATION
SW reached out to family for final selection. DELMIS spoke to daughter Tiffany who advised that Alicia was at the bank in a meeting but would call us afterwards. SW did advise that he was accepted at both facilities and both have a private room.    Respectfully submitted,    Twila ROSEN, MAGDALENE  [facility]   [phone]    Electronically signed by SILAS Maldonado, ZINA on 12/27/2024 at 10:28 AM

## 2024-12-27 NOTE — CARE COORDINATION
12/27/24 0851   Avoidable Days   Community/External       SNF  (Left messages for 3DW SNF's this morning.)       Respectfully submitted,    Twila ROSEN, MAGDALENE  [facility]   [phone]    Electronically signed by SILAS Maldonado, LSW on 12/27/2024 at 8:51 AM

## 2024-12-27 NOTE — CARE COORDINATION
[facility] to [facility] daughter today. She stated that this facility is closer to her home. SW clarified that [facility] and Critical access hospital are located off the same exit and are the same distance from her home but per her request we will check.    SW attempted to call the main line of [phone] and there was no answer. SW reached out to [facility] and asked them to email to have someone call with an answer right away as they were supposed to be checking her insurance yesterday.     Daughter is aware that we have [facility] and there is a high chance that patient will be leaving the building today.    We are currently waiting on a response.     Respectfully submitted,    Twila ROSEN, MAGDALENE  [facility]   [phone]    Electronically signed by SILAS Maldonado, ZINA on 12/27/2024 at 9:04 AM

## 2024-12-27 NOTE — PROGRESS NOTES
V2.0    [facility] [place]      Name:  Ran Espinoza /Age/Sex: 1931  (89 y.o. male)   MRN & CSN:  [id number] & [id number] Encounter Date/Time: 2024 1:57 PM EST   Location:  [id number]/4258-01 PCP: Wiliam Mahmood MD     Attending:Jesika Interiano MD       Hospital Day: 4    Assessment and Recommendations   Ran Espinoza is a 89 y.o. male who presents with Anemia        # Suspect acute on chronic anemia  # Suspect diastolic congestive heart failure  # Elevated troponin  # Lower extremity edema  # Acute hypoxic respiratory failure        Plan:     -Patient currently is hemodynamically stable, there is no evidence of any active bleeding.  Transfused 2 units prbc, check iron studies - reviewed.  Patient is not interested in further workup as it would entail EGD and colonoscopy.  Which I think is reasonable, he can be as needed transfused, pt significantly iron deficient, started IV iron     - recheck hb at 8 and transfuse if < 7     -Patient is noted to have lower extremity edema, I suspect he may have some degree of diastolic congestive heart failure, will diurese him once she has received the blood transfusion.      -Patient has been placed on oxygen by nasal cannula, he has dyspnea on exertion, likely because of his anemia, should improve with transfusion     -Elevated troponins demand ischemia, given his advanced age, and in the absence of any chest pain or EKG changes, will hold off any further workup     Medically ready for dc, but pt and family want to consider SNF vs assisted living as he lives alone and spouse is already at assisted living.     Diet ADULT DIET; Regular; No Added Salt (3-4 gm); 2000 ml   DVT Prophylaxis [] Lovenox, []  Heparin, [x] SCDs, [] Ambulation,  [] Eliquis, [] Xarelto  [] Coumadin   Code Status DNR-CCA             Personally reviewed Lab Studies and Imaging       Subjective:     No acute events overnight, hb stable    Review of Systems:      Pertinent positives and 
    V2.0    [facility] [place]      Name:  Ran Espinoza /Age/Sex: 1931  (89 y.o. male)   MRN & CSN:  [id number] & [id number] Encounter Date/Time: 2024 12:10 PM EST   Location:  [id number]/4258-01 PCP: Wiliam Mahmood MD     Attending:Jesika Interiano MD       Hospital Day: 5    Assessment and Recommendations   Ran Espinoza is a 89 y.o. male who presents with Anemia          # Suspect acute on chronic iron def anemia  # Suspect diastolic congestive heart failure  # Elevated troponin  # Lower extremity edema  # Acute hypoxic respiratory failure        Plan:     -Patient currently is hemodynamically stable, there is no evidence of any active bleeding.  Transfused 2 units prbc, check iron studies - reviewed.  Patient is not interested in further workup as it would entail EGD and colonoscopy.  Which I think is reasonable, he can be as needed transfused, pt significantly iron deficient, started IV iron     - recheck hb at 8.3 and transfuse if < 7     -Patient is noted to have lower extremity edema, I suspect he may have some degree of diastolic congestive heart failure, diuresed him once, LE ace wrap     -Patient has been placed on oxygen by nasal cannula, he has dyspnea on exertion, likely because of his anemia, should improve with transfusion - off O2     -Elevated troponins demand ischemia, given his advanced age, and in the absence of any chest pain or EKG changes, will hold off any further workup     Medically ready for dc, but pt and family want to consider SNF vs assisted living as he lives alone and spouse is already at assisted living. Medically ready for dc      Diet ADULT DIET; Regular; No Added Salt (3-4 gm); 2000 ml   DVT Prophylaxis [] Lovenox, []  Heparin, [x] SCDs, [] Ambulation,  [] Eliquis, [] Xarelto  [] Coumadin   Code Status DNR-CCA             Personally reviewed Lab Studies and Imaging       Subjective:     Feeling better today, off O2, tolerating diet    Review of Systems:  
    V2.0    [facility] [place]      Name:  Ran Espinoza /Age/Sex: 1931  (89 y.o. male)   MRN & CSN:  [id number] & [id number] Encounter Date/Time: 2024 5:14 PM EST   Location:  [id number]/4258-01 PCP: Wiliam Mahmood MD     Attending:Jesika Interiano MD       Hospital Day: 2    Assessment and Recommendations   Ran Espinoza is a 89 y.o. male who presents with Anemia      Plan:       # Suspect acute on chronic anemia  # Suspect diastolic congestive heart failure  # Elevated troponin  # Lower extremity edema  # Acute hypoxic respiratory failure        Plan:     -Patient currently is hemodynamically stable, there is no evidence of any active bleeding.  Transfused 2 units prbc, check iron studies - reviewed.  Patient is not interested in further workup as it would entail EGD and colonoscopy.  Which I think is reasonable, he can be as needed transfused, pt significantly iron deficient, start IV iron    - recheck hb later today and transfuse if < 7     -Patient is noted to have lower extremity edema, I suspect he may have some degree of diastolic congestive heart failure, will diurese him once she has received the blood transfusion.      -Patient has been placed on oxygen by nasal cannula, he has dyspnea on exertion, likely because of his anemia, should improve with transfusion     -Elevated troponins demand ischemia, given his advanced age, and in the absence of any chest pain or EKG changes, will hold off any further workup    Dc in am if hb stable       Diet ADULT DIET; Regular; No Added Salt (3-4 gm); 2000 ml   DVT Prophylaxis [] Lovenox, []  Heparin, [x] SCDs, [] Ambulation,  [] Eliquis, [] Xarelto  [] Coumadin   Code Status DNR-CCA             Personally reviewed Lab Studies and Imaging       Subjective:     Denies any blood in stool, feeling weak    Review of Systems:      Pertinent positives and negatives discussed in HPI    Objective:     Intake/Output Summary (Last 24 hours) at 2024 
  Physician Progress Note      PATIENT:               STACY ANSARI  CSN #:                  [id number]  :                       1931  ADMIT DATE:       2024 12:27 PM  DISCH DATE:  RESPONDING  PROVIDER #:        Jesika Interiano MD          QUERY TEXT:    Pt admitted with anemia documented, with Iron deficiency. If possible, please   document in progress notes and discharge summary further specificity regarding   the acuity and type of anemia:    The medical record reflects the following:  Risk Factors: Iron deficient, Age 89  Clinical Indicators: IM PN  Suspect acute on chronic anemia. Patient   currently is hemodynamically stable, there is no evidence of any active   bleeding. pt significantly iron deficient,  Treatment: Transfused 2 units prbc, check iron studies, started IV iron    Thank you,  Peg Bauman, [facility], [facility].  Options provided:  -- Anemia due to iron deficiency  -- Other - I will add my own diagnosis  -- Disagree - Not applicable / Not valid  -- Disagree - Clinically unable to determine / Unknown  -- Refer to Clinical Documentation Reviewer    PROVIDER RESPONSE TEXT:    This patient has iron deficiency anemia.    Query created by: Peg Bauman on 2024 3:52 AM      Electronically signed by:  Jesika Interiano MD 2024 12:11 PM          
  Physician Progress Note      PATIENT:               STACY ANSARI  CSN #:                  [id number]  :                       1931  ADMIT DATE:       2024 12:27 PM  DISCH DATE:  RESPONDING  PROVIDER #:        Jesika Interiano MD          QUERY TEXT:    Patient admitted with Anemia. Noted documentation of acute respiratory failure   in H&P on . In order to support the diagnosis of acute respiratory   failure, please include additional clinical indicators in your documentation.   Or please document if the diagnosis of acute respiratory failure has been   ruled out after further study.    The medical record reflects the following:  Risk Factors: Age 89, CHF  Clinical Indicators: H&P  Acute hypoxic respiratory failure. IM PN    Patient has been placed on oxygen by nasal cannula, he has dyspnea on   exertion, likely because of his anemia, should improve with transfusion  -SpO2 % on 2L NC  -RR 16-20  -Effort: Pulmonary effort is normal. No respiratory distress.  Treatment: 2L NC    Thank you,  Peg Bauman, [facility], [facility].  Options provided:  -- Acute Respiratory Failure as evidenced by, Please document evidence.  -- Acute Respiratory Failure ruled out after study  -- Other - I will add my own diagnosis  -- Disagree - Not applicable / Not valid  -- Disagree - Clinically unable to determine / Unknown  -- Refer to Clinical Documentation Reviewer    PROVIDER RESPONSE TEXT:    Acute Respiratory Failure ruled out after study.    Query created by: Peg Bauman on 2024 12:33 AM      Electronically signed by:  Jesika Interiano MD 2024 9:32 AM          
4 Eyes Skin Assessment     NAME:  Ran Espinoza  YOB: 1931  MEDICAL RECORD NUMBER:  [id number]    The patient is being assessed for  Admission    I agree that at least one RN has performed a thorough Head to Toe Skin Assessment on the patient. ALL assessment sites listed below have been assessed.      Areas assessed by both nurses:    Head, Face, Ears, Shoulders, Back, Chest, Arms, Elbows, Hands, Sacrum. Buttock, Coccyx, Ischium, and Legs. Feet and Heels        Does the Patient have a Wound? No noted wound(s)       Harjit Prevention initiated by RN: No  Wound Care Orders initiated by RN: No    Pressure Injury (Stage 3,4, Unstageable, DTI, NWPT, and Complex wounds) if present, place Wound referral order by RN under : No    New Ostomies, if present place, Ostomy referral order under : No     Nurse 1 eSignature: Electronically signed by Wendy Dupont RN on 12/23/24 at 6:07 PM EST    **SHARE this note so that the co-signing nurse can place an eSignature**    Nurse 2 eSignature: Electronically signed by Justin N. Schoenung, RN on 12/23/24 at 6:45 PM EST   
A stage 2 wound on patient's right ankle is noted, and this is indicated on the LDA. Wound dressing applied.   
Medication Reconciliation    List of medications patient is currently taking is complete.     Source of information: 1. Conversation with patient at bedside                                      2. EPIC records      Notes regarding home medications:   1. Patient denies use of any prescription medications.  Patient does take several OTC medications, including a baby aspirin.  2. Patient did not take any of his home medications yet today.    Desmond Sanchez [facility], PharmD, BCPS  12/23/2024 4:05 PM                
Occupational Therapy  Facility/Department: [address] MED SURG  Occupational Therapy Daily Treatment    Name: Ran Espinoza  : 1931  MRN: [id number]  Date of Service: 2024    Discharge Recommendations:  Patient would benefit from continued therapy after discharge, 3-5 sessions per week, 24 hour supervision or assist (may be d/c to [facility] with wife; otherwise SNF)     Ran Espinoza scored a  on the -[facility] ADL Inpatient form.     If patient discharges prior to next session this note will serve as a discharge summary.  Please see below for the latest assessment towards goals.        Patient Diagnosis(es): The primary encounter diagnosis was Anemia, unspecified type. A diagnosis of Dyspnea on exertion was also pertinent to this visit.  Past Medical History:  has a past medical history of Cancer (HCC), GERD (gastroesophageal reflux disease), Heart murmur, and Wears glasses.  Past Surgical History:  has a past surgical history that includes Ankle surgery (Right, ); Colonoscopy (2009); Endoscopy, colon, diagnostic (2009); Tonsillectomy and adenoidectomy; and Inguinal hernia repair (N/A, 10/13/2017).    Treatment Diagnosis: impaired ADL/fxl mobility      Assessment  Performance deficits / Impairments: Decreased functional mobility ;Decreased balance;Decreased ADL status;Decreased high-level IADLs;Decreased endurance;Decreased safe awareness  Assessment: 89 yo male admitted with SOB and anemia requiring 24 blood transfusion. PTA, pt lives alone and IND with ADL, IADL and fxl mobility no AD. Pt reports recent falls. Pt remains below baseline, requiring up to min A for functional transfers and CGA for functional mobility using RW. He required mod A for LB dressing, CGA standing grooming and toileting. Discussed w/ pt he is likely unsafe to return home alone at this time. Family trying to place pt in assisted living with his wife - if this cannot be arranged would recommend ongoing skilled 
Occupational Therapy  Facility/Department: [address] MED SURG  Occupational Therapy Initial Assessment    Name: Ran Espinoza  : 1931  MRN: [id number]  Date of Service: 2024    Discharge Recommendations:  2-3 sessions per week, Continue to assess pending progress, Patient would benefit from continued therapy after discharge, 3-5 sessions per week, 24 hour supervision or assist (If family able to provide initial 24 hr SUP, anticipate could return home with OT 2-3x/week. If unavailable, would rec further skilled OT at low-mod pace d/t low AMPAC score and fall risk)        Ran Espinoza scored a 17/ on the AM-PAC ADL Inpatient form. Current research shows that an AM-PAC score of 18 or greater is typically associated with a discharge to the patient's home setting. Based on the patient's AM-PAC score, and their current ADL deficits, it is recommended that the patient have 2-3 sessions per week of Occupational Therapy at d/c to increase the patient's independence.  At this time, this patient demonstrates the endurance and safety to discharge home with HHOT (home vs OP services) and a follow up treatment frequency of 2-3x/wk.   Please see assessment section for further patient specific details.    If patient discharges prior to next session this note will serve as a discharge summary.  Please see below for the latest assessment towards goals.     Patient Diagnosis(es): The primary encounter diagnosis was Anemia, unspecified type. A diagnosis of Dyspnea on exertion was also pertinent to this visit.  Past Medical History:  has a past medical history of Cancer (HCC), GERD (gastroesophageal reflux disease), Heart murmur, and Wears glasses.  Past Surgical History:  has a past surgical history that includes Ankle surgery (Right, ); Colonoscopy (2009); Endoscopy, colon, diagnostic (2009); Tonsillectomy and adenoidectomy; and Inguinal hernia repair (N/A, 10/13/2017).    Treatment Diagnosis: 
Patient alert and oriented x4. Patient resting comfortably in bed watching tv. Denies SOB at rest, no chest pain. VSS. Blood administration completed, tolerated well. Patients needs met at this time. Call light and needed items within reach.   
Patient hgb. 6.7 per lab. Secure message sent to [facility] [place]. Patient to receive 1 unit of PRBC.      02:17: patient tolerated blood administration well. VSS. Denies pain, sob, aoX4. Needs met at this time. Call light and needed items within reach.   
Physical Therapy  Facility/Department: [address] MED SURG  Physical Therapy Treatment Note    Name: Ran Espinoza  : 1931  MRN: [id number]  Date of Service: 2024    Discharge Recommendations:  Home with assist PRN, Home with Home health PT, Continue to assess pending progress          Patient Diagnosis(es): The primary encounter diagnosis was Anemia, unspecified type. A diagnosis of Dyspnea on exertion was also pertinent to this visit.  Past Medical History:  has a past medical history of Cancer (HCC), GERD (gastroesophageal reflux disease), Heart murmur, and Wears glasses.  Past Surgical History:  has a past surgical history that includes Ankle surgery (Right, ); Colonoscopy (2009); Endoscopy, colon, diagnostic (2009); Tonsillectomy and adenoidectomy; and Inguinal hernia repair (N/A, 10/13/2017).    Assessment  Body Structures, Functions, Activity Limitations Requiring Skilled Therapeutic Intervention: Decreased functional mobility ;Decreased ADL status;Decreased safe awareness;Decreased endurance;Decreased balance  Assessment: Ran Espinoza is a 89 y.o. male who presented to the ED on 24 or anemia, weakness, CASON. Prior to admission, pt living alone in home setting, independent with ADLs and ambulation without device. Pt currently functioning below baseline - ambulatory with RW. Anticipate patient will continue to improve and will be able to return home with PRN assist, use of rollator, and HHPT.  Treatment Diagnosis: impaired mobility  Therapy Prognosis: Fair  Decision Making: Medium Complexity  History: see above  Exam: see below  Clinical Presentation: evolving  Activity Tolerance  Activity Tolerance: Patient tolerated treatment well    Plan  Physical Therapy Plan  General Plan: 3-5 times per week  Current Treatment Recommendations: Strengthening, Balance training, Functional mobility training, Transfer training, Endurance training, Gait training, Neuromuscular re-education, 
Physical Therapy  Facility/Department: [address] MED SURG  Physical Therapy Initial Assessment    Name: Ran Espinoza  : 1931  MRN: [id number]  Date of Service: 2024    Discharge Recommendations:  Home with assist PRN, Home with Home health PT, Continue to assess pending progress        Hospital Staff Mobility Recommendation: Rolling Walker x 1 with gait belt      Patient Diagnosis(es): The primary encounter diagnosis was Anemia, unspecified type. A diagnosis of Dyspnea on exertion was also pertinent to this visit.  Past Medical History:  has a past medical history of Cancer (HCC), GERD (gastroesophageal reflux disease), Heart murmur, and Wears glasses.  Past Surgical History:  has a past surgical history that includes Ankle surgery (Right, ); Colonoscopy (2009); Endoscopy, colon, diagnostic (2009); Tonsillectomy and adenoidectomy; and Inguinal hernia repair (N/A, 10/13/2017).    Assessment  Body Structures, Functions, Activity Limitations Requiring Skilled Therapeutic Intervention: Decreased functional mobility ;Decreased ADL status;Decreased safe awareness;Decreased endurance;Decreased balance  Assessment: Ran Espinoza is a 89 y.o. male who presented to the ED on 24 or anemia, weakness, CASON. Prior to admission, pt living alone in home setting, independent with ADLs and ambulation without device. Pt currently functioning below baseline - ambulatory with RW. Anticipate patient will continue to improve and will be able to return home with PRN assist, use of rollator, and HHPT.  Treatment Diagnosis: impaired mobility  Therapy Prognosis: Fair  Decision Making: Medium Complexity  History: see above  Exam: see below  Clinical Presentation: evolving  Requires PT Follow-Up: Yes  Activity Tolerance  Activity Tolerance: Patient tolerated treatment well    Plan  Physical Therapy Plan  General Plan: 3-5 times per week  Current Treatment Recommendations: Strengthening, Balance training, 
Physical Therapy  Facility/Department: [address] MED SURG  Physical Therapy Treatment Note    Name: Ran Espinoza  : 1931  MRN: [id number]  Date of Service: 2024    Discharge Recommendations:  Home with assist PRN, Home with Home health PT, Continue to assess pending progress          Patient Diagnosis(es): The primary encounter diagnosis was Anemia, unspecified type. A diagnosis of Dyspnea on exertion was also pertinent to this visit.  Past Medical History:  has a past medical history of Cancer (HCC), GERD (gastroesophageal reflux disease), Heart murmur, and Wears glasses.  Past Surgical History:  has a past surgical history that includes Ankle surgery (Right, ); Colonoscopy (2009); Endoscopy, colon, diagnostic (2009); Tonsillectomy and adenoidectomy; and Inguinal hernia repair (N/A, 10/13/2017).    Assessment  Body Structures, Functions, Activity Limitations Requiring Skilled Therapeutic Intervention: Decreased functional mobility ;Decreased ADL status;Decreased safe awareness;Decreased endurance;Decreased balance  Assessment: Ran Espinoza is a 89 y.o. male who presented to the ED on 24 or anemia, weakness, CASON. Prior to admission, pt living alone in home setting, independent with ADLs and ambulation without device. Pt currently functioning below baseline - ambulatory with RW. Anticipate patient will continue to improve and will be able to return home with PRN assist, use of rollator, and HHPT.  Treatment Diagnosis: impaired mobility  Therapy Prognosis: Fair  Decision Making: Medium Complexity  History: see above  Exam: see below  Clinical Presentation: evolving  Activity Tolerance  Activity Tolerance: Patient tolerated treatment well    Plan  Physical Therapy Plan  General Plan: 3-5 times per week  Current Treatment Recommendations: Strengthening, Balance training, Functional mobility training, Transfer training, Endurance training, Gait training, Neuromuscular re-education, 
Pt alerted this RN about needing some medicine to help calm him down. Pt experiencing anxiety. VSS, aox4 & neuro check completed. Messaged [facility] [place] via perfect serve. Orders placed. Vistaril capsule 25 mg administered. Pt shared feeling anxious and upset over having to sell his home and land to move into [employer] where his wife is currently at. Provided therapeutic communication. Bed in lowest position, locked and alarm on.  Electronically signed by DONATO CLINE RN on 12/25/2024 at 9:16 PM    
Removed IV with Catheter intact. Pt tolerated well. Pt on EMS stretcher. DNR signed and updated.   
    Pertinent positives and negatives discussed in HPI    Objective:     Intake/Output Summary (Last 24 hours) at 12/25/2024 1417  Last data filed at 12/25/2024 1030  Gross per 24 hour   Intake 830 ml   Output 1300 ml   Net -470 ml      Vitals:   Vitals:    12/24/24 1939 12/25/24 0500 12/25/24 0737 12/25/24 0755   BP: (!) 155/84  (!) 148/85    Pulse: 66  67    Resp: 20  17    Temp: 97.4 °F (36.3 °C)  97.7 °F (36.5 °C)    TempSrc: Oral  Oral    SpO2: 99%  95% 95%   Weight:  62.1 kg (136 lb 14.5 oz)     Height:             Physical Exam:      General: Awake, alert and oriented, NAD  Eyes: EOMI  ENT: neck supple  Cardiovascular: S1S2 present, regular rate and rhythm, no murmurs  Respiratory: Clear to auscultation bilaterally  Gastrointestinal: Soft, non tender, + bowel sounds   Genitourinary: no suprapubic tenderness  Musculoskeletal: No edema    Medications:   Medications:    iron sucrose  200 mg IntraVENous Q24H    sodium chloride flush  5-40 mL IntraVENous 2 times per day    furosemide  40 mg IntraVENous Once      Infusions:    sodium chloride      sodium chloride      sodium chloride       PRN Meds: sodium chloride flush, 5-40 mL, PRN  sodium chloride, , PRN  potassium chloride, 40 mEq, PRN   Or  potassium alternative oral replacement, 40 mEq, PRN   Or  potassium chloride, 10 mEq, PRN  magnesium sulfate, 2,000 mg, PRN  ondansetron, 4 mg, Q8H PRN   Or  ondansetron, 4 mg, Q6H PRN  polyethylene glycol, 17 g, Daily PRN  acetaminophen, 650 mg, Q6H PRN   Or  acetaminophen, 650 mg, Q6H PRN  sodium chloride, , PRN  melatonin, 3 mg, Nightly PRN  sodium chloride, , PRN        Labs and Imaging   XR CHEST PORTABLE    Result Date: 12/23/2024  EXAMINATION: ONE XRAY VIEW OF THE CHEST 12/23/2024 12:41 pm COMPARISON: 10/25/2019 HISTORY: ORDERING SYSTEM PROVIDED HISTORY: CASON TECHNOLOGIST PROVIDED HISTORY: Reason for exam:->CASON FINDINGS: The lungs appear clear.  There is prominent cardiomegaly.  There is a large hiatal hernia.  Bony

## 2024-12-27 NOTE — CARE COORDINATION
SW received call from Lyn at [facility]. Clinically they can accept and they have a private room.     Respectfully submitted,    Twila ROSEN, BHUMIS  [facility]   [phone]    Electronically signed by SILAS Maldonado, MATEUSW on 12/27/2024 at 9:51 AM

## 2024-12-27 NOTE — TELEPHONE ENCOUNTER
Patient's daughter called in today regarding the patient is being set up with home care and a signature from Dr. Mahmood is needed. Tiffany was not sure if we have received anything regarding this yet and if Dr. Mahmood would be willing to sign it.

## 2024-12-27 NOTE — CARE COORDINATION
SW received a call from [facility] with the 3DW (waiver team) asking for updated MD note. SW reached out to let them know.     Respectfully submitted,    Twila ROSEN, MAGDALENE  [facility]   [phone]    Electronically signed by SILAS Maldonado, ZINA on 12/27/2024 at 11:35 AM

## 2024-12-27 NOTE — CARE COORDINATION
Wound referral for stage 2 to right ankle. Spoke with RN, pt has dressing on ankle. Plans to discharge to facility today. Wilfrid Beavers, MSN, RN, CWOCN

## 2024-12-27 NOTE — PLAN OF CARE
Problem: Discharge Planning  Goal: Discharge to home or other facility with appropriate resources  12/25/2024 1155 by Ana Laura Dillard RN  Outcome: Progressing  12/25/2024 0143 by Geronimo Guzmán RN  Outcome: Progressing     Problem: Pain  Goal: Verbalizes/displays adequate comfort level or baseline comfort level  12/25/2024 1155 by Ana Laura Dillard RN  Outcome: Progressing  12/25/2024 0143 by Geronimo Guzmán RN  Outcome: Progressing     Problem: ABCDS Injury Assessment  Goal: Absence of physical injury  12/25/2024 1155 by Ana Laura Dillard RN  Outcome: Progressing  12/25/2024 0143 by Geronimo Guzmán RN  Outcome: Progressing     Problem: Safety - Adult  Goal: Free from fall injury  12/25/2024 1155 by Ana Laura Dillard RN  Outcome: Progressing  12/25/2024 0143 by Geronimo Guzmán RN  Outcome: Progressing     Problem: Respiratory - Adult  Goal: Achieves optimal ventilation and oxygenation  12/25/2024 1155 by Ana Laura Dillard RN  Outcome: Progressing  12/25/2024 0143 by Geronimo Guzmán RN  Outcome: Progressing     Problem: Cardiovascular - Adult  Goal: Absence of cardiac dysrhythmias or at baseline  12/25/2024 1155 by Ana Laura Dillard RN  Outcome: Progressing  12/25/2024 0143 by Geronimo Guzmán RN  Outcome: Progressing     Problem: Metabolic/Fluid and Electrolytes - Adult  Goal: Electrolytes maintained within normal limits  12/25/2024 1155 by Ana Laura Dillard RN  Outcome: Progressing  12/25/2024 0143 by Geronimo Guzmán RN  Outcome: Progressing     
  Problem: Discharge Planning  Goal: Discharge to home or other facility with appropriate resources  12/25/2024 1929 by Joaquina Spring RN  Outcome: Progressing  12/25/2024 1155 by Ana Laura Dillard RN  Outcome: Progressing     Problem: Pain  Goal: Verbalizes/displays adequate comfort level or baseline comfort level  12/25/2024 1929 by Joaquina Spring RN  Outcome: Progressing  12/25/2024 1155 by Ana Laura Dillard RN  Outcome: Progressing     Problem: ABCDS Injury Assessment  Goal: Absence of physical injury  12/25/2024 1929 by Joaquina Spring RN  Outcome: Progressing  12/25/2024 1155 by Ana Laura Dillard RN  Outcome: Progressing     Problem: Safety - Adult  Goal: Free from fall injury  12/25/2024 1929 by Joaquina Spring RN  Outcome: Progressing  12/25/2024 1155 by Ana Laura Dillard RN  Outcome: Progressing     Problem: Respiratory - Adult  Goal: Achieves optimal ventilation and oxygenation  12/25/2024 1155 by Ana Laura Dillard RN  Outcome: Progressing     Problem: Cardiovascular - Adult  Goal: Absence of cardiac dysrhythmias or at baseline  12/25/2024 1155 by Ana Laura Dillard RN  Outcome: Progressing     Problem: Metabolic/Fluid and Electrolytes - Adult  Goal: Electrolytes maintained within normal limits  12/25/2024 1155 by Ana Laura Dillard RN  Outcome: Progressing     
  Problem: Discharge Planning  Goal: Discharge to home or other facility with appropriate resources  Outcome: Completed     Problem: Pain  Goal: Verbalizes/displays adequate comfort level or baseline comfort level  Outcome: Completed     Problem: ABCDS Injury Assessment  Goal: Absence of physical injury  Outcome: Completed     Problem: Safety - Adult  Goal: Free from fall injury  Outcome: Completed     Problem: Respiratory - Adult  Goal: Achieves optimal ventilation and oxygenation  Outcome: Completed     Problem: Cardiovascular - Adult  Goal: Absence of cardiac dysrhythmias or at baseline  Outcome: Completed     Problem: Metabolic/Fluid and Electrolytes - Adult  Goal: Electrolytes maintained within normal limits  Outcome: Completed     Problem: Anxiety  Goal: Will report anxiety at manageable levels  Description: INTERVENTIONS:  1. Administer medication as ordered  2. Teach and rehearse alternative coping skills  3. Provide emotional support with 1:1 interaction with staff  Outcome: Completed     Problem: Coping  Goal: Pt/Family able to verbalize concerns and demonstrate effective coping strategies  Description: INTERVENTIONS:  1. Assist patient/family to identify coping skills, available support systems and cultural and spiritual values  2. Provide emotional support, including active listening and acknowledgement of concerns of patient and caregivers  3. Reduce environmental stimuli, as able  4. Instruct patient/family in relaxation techniques, as appropriate  5. Assess for spiritual pain/suffering and initiate Spiritual Care, Psychosocial Clinical Specialist consults as needed  Outcome: Completed     Problem: Decision Making  Goal: Pt/Family able to effectively weigh alternatives and participate in decision making related to treatment and care  Description: INTERVENTIONS:  1. Determine when there are differences between patient's view, family's view, and healthcare provider's view of condition  2. Facilitate 
  Problem: Discharge Planning  Goal: Discharge to home or other facility with appropriate resources  Outcome: Progressing     Problem: Pain  Goal: Verbalizes/displays adequate comfort level or baseline comfort level  Outcome: Progressing     Problem: ABCDS Injury Assessment  Goal: Absence of physical injury  Outcome: Progressing     Problem: Safety - Adult  Goal: Free from fall injury  Outcome: Progressing     
  Problem: Discharge Planning  Goal: Discharge to home or other facility with appropriate resources  Outcome: Progressing     Problem: Pain  Goal: Verbalizes/displays adequate comfort level or baseline comfort level  Outcome: Progressing     Problem: ABCDS Injury Assessment  Goal: Absence of physical injury  Outcome: Progressing     Problem: Safety - Adult  Goal: Free from fall injury  Outcome: Progressing     Problem: Respiratory - Adult  Goal: Achieves optimal ventilation and oxygenation  Outcome: Progressing     Problem: Cardiovascular - Adult  Goal: Absence of cardiac dysrhythmias or at baseline  Outcome: Progressing     Problem: Metabolic/Fluid and Electrolytes - Adult  Goal: Electrolytes maintained within normal limits  Outcome: Progressing     
  Problem: Discharge Planning  Goal: Discharge to home or other facility with appropriate resources  Outcome: Progressing     Problem: Pain  Goal: Verbalizes/displays adequate comfort level or baseline comfort level  Outcome: Progressing     Problem: ABCDS Injury Assessment  Goal: Absence of physical injury  Outcome: Progressing     Problem: Safety - Adult  Goal: Free from fall injury  Outcome: Progressing     Problem: Respiratory - Adult  Goal: Achieves optimal ventilation and oxygenation  Outcome: Progressing     Problem: Cardiovascular - Adult  Goal: Absence of cardiac dysrhythmias or at baseline  Outcome: Progressing     Problem: Metabolic/Fluid and Electrolytes - Adult  Goal: Electrolytes maintained within normal limits  Outcome: Progressing     Problem: Neurosensory - Adult  Goal: Achieves stable or improved neurological status  Outcome: Progressing     Problem: Skin/Tissue Integrity - Adult  Goal: Skin integrity remains intact  Outcome: Progressing     Problem: Musculoskeletal - Adult  Goal: Return mobility to safest level of function  Outcome: Progressing     Problem: Anxiety  Goal: Will report anxiety at manageable levels  Description: INTERVENTIONS:  1. Administer medication as ordered  2. Teach and rehearse alternative coping skills  3. Provide emotional support with 1:1 interaction with staff  Outcome: Progressing     Problem: Gastrointestinal - Adult  Goal: Minimal or absence of nausea and vomiting  Outcome: Progressing     Problem: Coping  Goal: Pt/Family able to verbalize concerns and demonstrate effective coping strategies  Description: INTERVENTIONS:  1. Assist patient/family to identify coping skills, available support systems and cultural and spiritual values  2. Provide emotional support, including active listening and acknowledgement of concerns of patient and caregivers  3. Reduce environmental stimuli, as able  4. Instruct patient/family in relaxation techniques, as appropriate  5. Assess for 
  Problem: Pain  Goal: Verbalizes/displays adequate comfort level or baseline comfort level  12/24/2024 1125 by Mary Kate Umana RN  Outcome: Progressing     Problem: ABCDS Injury Assessment  Goal: Absence of physical injury  12/24/2024 1125 by Mary Kate Umana RN  Outcome: Progressing     Problem: Safety - Adult  Goal: Free from fall injury  12/24/2024 1125 by Mary Kate Umana RN  Outcome: Progressing     Problem: Respiratory - Adult  Goal: Achieves optimal ventilation and oxygenation  Outcome: Progressing     Problem: Cardiovascular - Adult  Goal: Absence of cardiac dysrhythmias or at baseline  Outcome: Progressing     Problem: Metabolic/Fluid and Electrolytes - Adult  Goal: Electrolytes maintained within normal limits  Outcome: Progressing     Problem: Discharge Planning  Goal: Discharge to home or other facility with appropriate resources  12/24/2024 1125 by Mary Kate Umana RN  Outcome: Progressing     
safest level of function  12/26/2024 1158 by Candace Salinas, RN  Outcome: Progressing     Problem: Gastrointestinal - Adult  Goal: Minimal or absence of nausea and vomiting  12/26/2024 1158 by Candace Salinas, RN  Outcome: Progressing

## 2024-12-27 NOTE — CARE COORDINATION
SW received call back from [facility] stating that they can accept and they also have a private room.     Respectfully submitted,    Twila ROSEN, MAGDALENE  [facility]   [phone]    Electronically signed by SILAS Maldonado, ZINA on 12/27/2024 at 10:26 AM

## 2024-12-28 LAB
BACTERIA SPEC AEROBE CULT: ABNORMAL
BACTERIA SPEC AEROBE CULT: ABNORMAL
GRAM STN SPEC: ABNORMAL
ORGANISM: ABNORMAL

## 2024-12-30 ENCOUNTER — CARE COORDINATION (OUTPATIENT)
Dept: CARE COORDINATION | Age: 88
End: 2024-12-30

## 2024-12-30 ENCOUNTER — TELEPHONE (OUTPATIENT)
Dept: FAMILY MEDICINE CLINIC | Age: 88
End: 2024-12-30

## 2024-12-30 NOTE — TELEPHONE ENCOUNTER
Joseline with [facility] called, patient left AMA refused to stay,he left before he could be seen by a doctor there.  patient called and had a friend pick him up.  Patient daughter's were notified as well.       Any questions, [phone]

## 2024-12-30 NOTE — CARE COORDINATION
Care Transitions Post-Acute Facility Discharge Call    2024    Patient: Ran Espinoza Patient : 1931   MRN: <[id number]>  Reason for Admission: anemia (hgb 6.2), dyspnea on exertion, CHF (BNP- 4,088), hypoxia (85-88% on room air), BLE edema  Discharge Date: 24 RARS: Readmission Risk Score: 12.2       Post Acute Facility Update    Care Transitions Post Acute Facility Update    Care Transitions Interventions      Post Acute Facility Update   Post Acute Facility: [facility]    ELOS: 17 days      Nursing   Prescribed diet: regular/ cardiac/ DEX w/ 2L/ day fluid restriction    Wounds: Neg   Disease specific clinical considerations: HLD, 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 CBC 1/3; CMP, CBC w/ diff- . DNRCC-A.        Rehab/Functional   Prior Level of Functioning: home alone independent       SW/Discharge Planning   Discharge Planning / Barriers: Pt from multi-level home alone (wife lives in [place] at [facility]). Pt lives on main level w/ B&B. Ramp entrance. Independent ADL, assist iADL. DME at home- walker, 4WW, WIS, SC w/ back, ETS, GB in shower, SC, GB on toilet   Care Progression on Track?: Pos  Next IDT Planned Review: 25           Next IDT Planned Review: 2025    No future appointments.    SN Notes:   Diet: - Oral Diet:  Cardiac, Low Sodium (3-4gm), and 2L/ day fluid restriction  Wounds: none  Medications: Other: med rec complete  Other:    Post-acute CC Notes: emailed admission orders    SALO WONG RN

## 2024-12-31 ENCOUNTER — CARE COORDINATION (OUTPATIENT)
Dept: CARE COORDINATION | Age: 88
End: 2024-12-31

## 2024-12-31 NOTE — CARE COORDINATION
Care Transitions Post-Acute Facility Discharge Call    2024    Patient: Ran Espinoza Patient : 1931   MRN: <[id number]>  Reason for Admission: anemia (hgb 6.2), dyspnea on exertion, CHF (BNP- 4,088), hypoxia (85-88% on room air), BLE edema    Discharge Date: 24 RARS: Readmission Risk Score: 12.2    Writer received notification from IDT patient was planning to leave AMA yesterday. [facility] discharge list confirmed that this morning. First attempt at final PAC THIEN call. Will attempt later     Discharge Facility:    Care Transitions Post Acute Facility Transition    Post Acute Facility: [facility]  Post Acute Admit Date: 24  Post Acute Discharge Date: 24  ELOS: 17 days           Care Transitions Interventions         No future appointments.    SALO WONG RN

## 2025-01-01 ENCOUNTER — HOSPITAL ENCOUNTER (INPATIENT)
Age: 89
LOS: 3 days | Discharge: HOSPICE | DRG: 377 | End: 2025-03-06
Attending: EMERGENCY MEDICINE | Admitting: HOSPITALIST
Payer: MEDICARE

## 2025-01-01 ENCOUNTER — APPOINTMENT (OUTPATIENT)
Dept: CT IMAGING | Age: 89
DRG: 377 | End: 2025-01-01
Payer: MEDICARE

## 2025-01-01 ENCOUNTER — APPOINTMENT (OUTPATIENT)
Dept: GENERAL RADIOLOGY | Age: 89
DRG: 377 | End: 2025-01-01
Payer: MEDICARE

## 2025-01-01 ENCOUNTER — TELEPHONE (OUTPATIENT)
Dept: CARDIOLOGY CLINIC | Age: 89
End: 2025-01-01

## 2025-01-01 VITALS
HEIGHT: 67 IN | BODY MASS INDEX: 20.21 KG/M2 | RESPIRATION RATE: 12 BRPM | OXYGEN SATURATION: 92 % | SYSTOLIC BLOOD PRESSURE: 180 MMHG | TEMPERATURE: 97.1 F | WEIGHT: 128.75 LBS | HEART RATE: 82 BPM | DIASTOLIC BLOOD PRESSURE: 96 MMHG

## 2025-01-01 DIAGNOSIS — R93.89 ABNORMAL FINDING ON CT SCAN: ICD-10-CM

## 2025-01-01 DIAGNOSIS — Z71.89 GOALS OF CARE, COUNSELING/DISCUSSION: ICD-10-CM

## 2025-01-01 DIAGNOSIS — K92.2 ACUTE UPPER GI BLEEDING: Primary | ICD-10-CM

## 2025-01-01 DIAGNOSIS — K44.9 HIATAL HERNIA: ICD-10-CM

## 2025-01-01 DIAGNOSIS — J90 BILATERAL PLEURAL EFFUSION: ICD-10-CM

## 2025-01-01 DIAGNOSIS — R19.5 POSITIVE OCCULT STOOL BLOOD TEST: ICD-10-CM

## 2025-01-01 DIAGNOSIS — S72.002A CLOSED FRACTURE OF LEFT HIP, INITIAL ENCOUNTER (HCC): ICD-10-CM

## 2025-01-01 DIAGNOSIS — S51.812A SKIN TEAR OF LEFT FOREARM WITHOUT COMPLICATION, INITIAL ENCOUNTER: ICD-10-CM

## 2025-01-01 DIAGNOSIS — D62 ACUTE BLOOD LOSS ANEMIA: ICD-10-CM
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
## 2025-01-01 DIAGNOSIS — K43.9 VENTRAL HERNIA WITHOUT OBSTRUCTION OR GANGRENE: ICD-10-CM

## 2025-01-01 LAB
ABO + RH BLD: NORMAL
ALBUMIN SERPL-MCNC: 2.9 G/DL (ref 3.4–5)
ALBUMIN SERPL-MCNC: 3.3 G/DL (ref 3.4–5)
ALBUMIN/GLOB SERPL: 1.2 {RATIO} (ref 1.1–2.2)
ALBUMIN/GLOB SERPL: 1.3 {RATIO} (ref 1.1–2.2)
ALP SERPL-CCNC: 155 U/L (ref 40–129)
ALP SERPL-CCNC: 162 U/L (ref 40–129)
ALT SERPL-CCNC: 16 U/L (ref 10–40)
ALT SERPL-CCNC: 16 U/L (ref 10–40)
ANION GAP SERPL CALCULATED.3IONS-SCNC: 10 MMOL/L (ref 3–16)
ANION GAP SERPL CALCULATED.3IONS-SCNC: 10 MMOL/L (ref 3–16)
APTT BLD: 27.1 SEC (ref 22.1–36.4)
AST SERPL-CCNC: 37 U/L (ref 15–37)
AST SERPL-CCNC: 45 U/L (ref 15–37)
BASOPHILS # BLD: 0 K/UL (ref 0–0.2)
BASOPHILS # BLD: 0 K/UL (ref 0–0.2)
BASOPHILS NFR BLD: 0.3 %
BASOPHILS NFR BLD: 0.7 %
BILIRUB SERPL-MCNC: 0.6 MG/DL (ref 0–1)
BILIRUB SERPL-MCNC: 1 MG/DL (ref 0–1)
BLD GP AB SCN SERPL QL: NORMAL
BLOOD BANK DISPENSE STATUS: NORMAL
BLOOD BANK PRODUCT CODE: NORMAL
BPU ID: NORMAL
BUN SERPL-MCNC: 32 MG/DL (ref 7–20)
BUN SERPL-MCNC: 37 MG/DL (ref 7–20)
CALCIUM SERPL-MCNC: 8.4 MG/DL (ref 8.3–10.6)
CALCIUM SERPL-MCNC: 8.8 MG/DL (ref 8.3–10.6)
CHLORIDE SERPL-SCNC: 108 MMOL/L (ref 99–110)
CHLORIDE SERPL-SCNC: 109 MMOL/L (ref 99–110)
CO2 SERPL-SCNC: 24 MMOL/L (ref 21–32)
CO2 SERPL-SCNC: 25 MMOL/L (ref 21–32)
CREAT SERPL-MCNC: 1 MG/DL (ref 0.8–1.3)
CREAT SERPL-MCNC: 1.2 MG/DL (ref 0.8–1.3)
DEPRECATED RDW RBC AUTO: 18.4 % (ref 12.4–15.4)
DEPRECATED RDW RBC AUTO: 18.6 % (ref 12.4–15.4)
DESCRIPTION BLOOD BANK: NORMAL
EKG ATRIAL RATE: 74 BPM
EKG DIAGNOSIS: NORMAL
EKG P AXIS: 25 DEGREES
EKG Q-T INTERVAL: 422 MS
EKG QRS DURATION: 92 MS
EKG QTC CALCULATION (BAZETT): 468 MS
EKG R AXIS: 0 DEGREES
EKG T AXIS: 14 DEGREES
EKG VENTRICULAR RATE: 74 BPM
EOSINOPHIL # BLD: 0 K/UL (ref 0–0.6)
EOSINOPHIL # BLD: 0 K/UL (ref 0–0.6)
EOSINOPHIL NFR BLD: 0.2 %
EOSINOPHIL NFR BLD: 0.5 %
FERRITIN SERPL IA-MCNC: 179 NG/ML (ref 30–400)
GFR SERPLBLD CREATININE-BSD FMLA CKD-EPI: 56 ML/MIN/{1.73_M2}
GFR SERPLBLD CREATININE-BSD FMLA CKD-EPI: 70 ML/MIN/{1.73_M2}
GLUCOSE SERPL-MCNC: 103 MG/DL (ref 70–99)
GLUCOSE SERPL-MCNC: 108 MG/DL (ref 70–99)
HCT VFR BLD AUTO: 20.5 % (ref 40.5–52.5)
HCT VFR BLD AUTO: 23.3 % (ref 40.5–52.5)
HCT VFR BLD AUTO: 24.9 % (ref 40.5–52.5)
HEMOCCULT STL QL: ABNORMAL
HGB BLD-MCNC: 6.5 G/DL (ref 13.5–17.5)
HGB BLD-MCNC: 7.7 G/DL (ref 13.5–17.5)
HGB BLD-MCNC: 8.1 G/DL (ref 13.5–17.5)
INR PPP: 1.06 (ref 0.85–1.15)
IRON SATN MFR SERPL: 89 % (ref 20–50)
IRON SERPL-MCNC: 158 UG/DL (ref 59–158)
LYMPHOCYTES # BLD: 0.2 K/UL (ref 1–5.1)
LYMPHOCYTES # BLD: 0.2 K/UL (ref 1–5.1)
LYMPHOCYTES NFR BLD: 2.1 %
LYMPHOCYTES NFR BLD: 3.1 %
MCH RBC QN AUTO: 32.8 PG (ref 26–34)
MCH RBC QN AUTO: 33.5 PG (ref 26–34)
MCHC RBC AUTO-ENTMCNC: 32.7 G/DL (ref 31–36)
MCHC RBC AUTO-ENTMCNC: 33.2 G/DL (ref 31–36)
MCV RBC AUTO: 100.2 FL (ref 80–100)
MCV RBC AUTO: 101 FL (ref 80–100)
MONOCYTES # BLD: 0.3 K/UL (ref 0–1.3)
MONOCYTES # BLD: 0.5 K/UL (ref 0–1.3)
MONOCYTES NFR BLD: 3.8 %
MONOCYTES NFR BLD: 5.8 %
NEUTROPHILS # BLD: 6.7 K/UL (ref 1.7–7.7)
NEUTROPHILS # BLD: 7.9 K/UL (ref 1.7–7.7)
NEUTROPHILS NFR BLD: 91.6 %
NEUTROPHILS NFR BLD: 91.9 %
PLATELET # BLD AUTO: 190 K/UL (ref 135–450)
PLATELET # BLD AUTO: 253 K/UL (ref 135–450)
PMV BLD AUTO: 8.3 FL (ref 5–10.5)
PMV BLD AUTO: 8.3 FL (ref 5–10.5)
POTASSIUM SERPL-SCNC: 4.3 MMOL/L (ref 3.5–5.1)
POTASSIUM SERPL-SCNC: 4.6 MMOL/L (ref 3.5–5.1)
PROT SERPL-MCNC: 5.4 G/DL (ref 6.4–8.2)
PROT SERPL-MCNC: 5.8 G/DL (ref 6.4–8.2)
PROTHROMBIN TIME: 14 SEC (ref 11.9–14.9)
RBC # BLD AUTO: 2.31 M/UL (ref 4.2–5.9)
RBC # BLD AUTO: 2.48 M/UL (ref 4.2–5.9)
SODIUM SERPL-SCNC: 143 MMOL/L (ref 136–145)
SODIUM SERPL-SCNC: 143 MMOL/L (ref 136–145)
TIBC SERPL-MCNC: 177 UG/DL (ref 260–445)
WBC # BLD AUTO: 7.2 K/UL (ref 4–11)
WBC # BLD AUTO: 8.6 K/UL (ref 4–11)

## 2025-01-01 PROCEDURE — 73700 CT LOWER EXTREMITY W/O DYE: CPT

## 2025-01-01 PROCEDURE — 94761 N-INVAS EAR/PLS OXIMETRY MLT: CPT

## 2025-01-01 PROCEDURE — 2500000003 HC RX 250 WO HCPCS: Performed by: HOSPITALIST

## 2025-01-01 PROCEDURE — 36415 COLL VENOUS BLD VENIPUNCTURE: CPT

## 2025-01-01 PROCEDURE — 2060000000 HC ICU INTERMEDIATE R&B

## 2025-01-01 PROCEDURE — 85025 COMPLETE CBC W/AUTO DIFF WBC: CPT

## 2025-01-01 PROCEDURE — 86901 BLOOD TYPING SEROLOGIC RH(D): CPT

## 2025-01-01 PROCEDURE — 74174 CTA ABD&PLVS W/CONTRAST: CPT

## 2025-01-01 PROCEDURE — 6360000002 HC RX W HCPCS: Performed by: NURSE PRACTITIONER

## 2025-01-01 PROCEDURE — 85730 THROMBOPLASTIN TIME PARTIAL: CPT

## 2025-01-01 PROCEDURE — 82728 ASSAY OF FERRITIN: CPT

## 2025-01-01 PROCEDURE — 2700000000 HC OXYGEN THERAPY PER DAY

## 2025-01-01 PROCEDURE — 85018 HEMOGLOBIN: CPT

## 2025-01-01 PROCEDURE — 80053 COMPREHEN METABOLIC PANEL: CPT

## 2025-01-01 PROCEDURE — 99285 EMERGENCY DEPT VISIT HI MDM: CPT

## 2025-01-01 PROCEDURE — 6360000002 HC RX W HCPCS: Performed by: HOSPITALIST

## 2025-01-01 PROCEDURE — 86923 COMPATIBILITY TEST ELECTRIC: CPT

## 2025-01-01 PROCEDURE — 83550 IRON BINDING TEST: CPT

## 2025-01-01 PROCEDURE — 85014 HEMATOCRIT: CPT

## 2025-01-01 PROCEDURE — 6360000002 HC RX W HCPCS: Performed by: INTERNAL MEDICINE

## 2025-01-01 PROCEDURE — 2580000003 HC RX 258: Performed by: HOSPITALIST

## 2025-01-01 PROCEDURE — 6370000000 HC RX 637 (ALT 250 FOR IP): Performed by: NURSE PRACTITIONER

## 2025-01-01 PROCEDURE — 96374 THER/PROPH/DIAG INJ IV PUSH: CPT

## 2025-01-01 PROCEDURE — 86900 BLOOD TYPING SEROLOGIC ABO: CPT

## 2025-01-01 PROCEDURE — 71260 CT THORAX DX C+: CPT

## 2025-01-01 PROCEDURE — 30233N1 TRANSFUSION OF NONAUTOLOGOUS RED BLOOD CELLS INTO PERIPHERAL VEIN, PERCUTANEOUS APPROACH: ICD-10-PCS | Performed by: HOSPITALIST

## 2025-01-01 PROCEDURE — 93005 ELECTROCARDIOGRAM TRACING: CPT | Performed by: EMERGENCY MEDICINE

## 2025-01-01 PROCEDURE — 82270 OCCULT BLOOD FECES: CPT

## 2025-01-01 PROCEDURE — 86850 RBC ANTIBODY SCREEN: CPT

## 2025-01-01 PROCEDURE — 93010 ELECTROCARDIOGRAM REPORT: CPT | Performed by: INTERNAL MEDICINE

## 2025-01-01 PROCEDURE — 73502 X-RAY EXAM HIP UNI 2-3 VIEWS: CPT

## 2025-01-01 PROCEDURE — P9016 RBC LEUKOCYTES REDUCED: HCPCS

## 2025-01-01 PROCEDURE — 6360000004 HC RX CONTRAST MEDICATION: Performed by: NURSE PRACTITIONER

## 2025-01-01 PROCEDURE — 99222 1ST HOSP IP/OBS MODERATE 55: CPT | Performed by: NURSE PRACTITIONER

## 2025-01-01 PROCEDURE — 85610 PROTHROMBIN TIME: CPT

## 2025-01-01 PROCEDURE — 83540 ASSAY OF IRON: CPT

## 2025-01-01 PROCEDURE — 2580000003 HC RX 258: Performed by: INTERNAL MEDICINE

## 2025-01-01 RX ORDER — PANTOPRAZOLE SODIUM 40 MG/10ML
80 INJECTION, POWDER, LYOPHILIZED, FOR SOLUTION INTRAVENOUS ONCE
Status: COMPLETED | OUTPATIENT
Start: 2025-01-01 | End: 2025-01-01

## 2025-01-01 RX ORDER — SODIUM CHLORIDE 0.9 % (FLUSH) 0.9 %
5-40 SYRINGE (ML) INJECTION PRN
Status: DISCONTINUED | OUTPATIENT
Start: 2025-01-01 | End: 2025-01-01 | Stop reason: HOSPADM

## 2025-01-01 RX ORDER — ONDANSETRON 4 MG/1
4 TABLET, ORALLY DISINTEGRATING ORAL EVERY 8 HOURS PRN
Status: DISCONTINUED | OUTPATIENT
Start: 2025-01-01 | End: 2025-01-01 | Stop reason: HOSPADM

## 2025-01-01 RX ORDER — NEOMYCIN/BACITRACIN/POLYMYXINB 3.5-400-5K
OINTMENT (GRAM) TOPICAL ONCE
Status: COMPLETED | OUTPATIENT
Start: 2025-01-01 | End: 2025-01-01

## 2025-01-01 RX ORDER — OXYCODONE AND ACETAMINOPHEN 5; 325 MG/1; MG/1
1 TABLET ORAL EVERY 4 HOURS PRN
Status: DISCONTINUED | OUTPATIENT
Start: 2025-01-01 | End: 2025-01-01 | Stop reason: HOSPADM

## 2025-01-01 RX ORDER — MORPHINE SULFATE 2 MG/ML
2 INJECTION, SOLUTION INTRAMUSCULAR; INTRAVENOUS EVERY 4 HOURS PRN
Status: DISCONTINUED | OUTPATIENT
Start: 2025-01-01 | End: 2025-01-01

## 2025-01-01 RX ORDER — POTASSIUM CHLORIDE 7.45 MG/ML
10 INJECTION INTRAVENOUS PRN
Status: DISCONTINUED | OUTPATIENT
Start: 2025-01-01 | End: 2025-01-01 | Stop reason: HOSPADM

## 2025-01-01 RX ORDER — SODIUM CHLORIDE 0.9 % (FLUSH) 0.9 %
5-40 SYRINGE (ML) INJECTION EVERY 12 HOURS SCHEDULED
Status: DISCONTINUED | OUTPATIENT
Start: 2025-01-01 | End: 2025-01-01 | Stop reason: HOSPADM

## 2025-01-01 RX ORDER — IOPAMIDOL 755 MG/ML
75 INJECTION, SOLUTION INTRAVASCULAR
Status: COMPLETED | OUTPATIENT
Start: 2025-01-01 | End: 2025-01-01

## 2025-01-01 RX ORDER — ENOXAPARIN SODIUM 100 MG/ML
40 INJECTION SUBCUTANEOUS DAILY
Status: DISCONTINUED | OUTPATIENT
Start: 2025-01-01 | End: 2025-01-01 | Stop reason: HOSPADM

## 2025-01-01 RX ORDER — MAGNESIUM SULFATE IN WATER 40 MG/ML
2000 INJECTION, SOLUTION INTRAVENOUS PRN
Status: DISCONTINUED | OUTPATIENT
Start: 2025-01-01 | End: 2025-01-01 | Stop reason: HOSPADM

## 2025-01-01 RX ORDER — MORPHINE SULFATE 2 MG/ML
2 INJECTION, SOLUTION INTRAMUSCULAR; INTRAVENOUS
Status: DISCONTINUED | OUTPATIENT
Start: 2025-01-01 | End: 2025-01-01

## 2025-01-01 RX ORDER — HALOPERIDOL 5 MG/ML
5 INJECTION INTRAMUSCULAR EVERY 6 HOURS PRN
Status: DISCONTINUED | OUTPATIENT
Start: 2025-01-01 | End: 2025-01-01 | Stop reason: HOSPADM

## 2025-01-01 RX ORDER — MORPHINE SULFATE 4 MG/ML
4 INJECTION, SOLUTION INTRAMUSCULAR; INTRAVENOUS
Status: DISCONTINUED | OUTPATIENT
Start: 2025-01-01 | End: 2025-01-01 | Stop reason: HOSPADM

## 2025-01-01 RX ORDER — LORAZEPAM 2 MG/ML
1 INJECTION INTRAMUSCULAR
Status: DISCONTINUED | OUTPATIENT
Start: 2025-01-01 | End: 2025-01-01 | Stop reason: HOSPADM

## 2025-01-01 RX ORDER — SODIUM CHLORIDE 9 MG/ML
INJECTION, SOLUTION INTRAVENOUS CONTINUOUS
Status: ACTIVE | OUTPATIENT
Start: 2025-01-01 | End: 2025-01-01

## 2025-01-01 RX ORDER — ONDANSETRON 2 MG/ML
4 INJECTION INTRAMUSCULAR; INTRAVENOUS EVERY 6 HOURS PRN
Status: DISCONTINUED | OUTPATIENT
Start: 2025-01-01 | End: 2025-01-01 | Stop reason: HOSPADM

## 2025-01-01 RX ORDER — POTASSIUM CHLORIDE 1500 MG/1
40 TABLET, EXTENDED RELEASE ORAL PRN
Status: DISCONTINUED | OUTPATIENT
Start: 2025-01-01 | End: 2025-01-01 | Stop reason: HOSPADM

## 2025-01-01 RX ORDER — POLYETHYLENE GLYCOL 3350 17 G/17G
17 POWDER, FOR SOLUTION ORAL DAILY PRN
Status: DISCONTINUED | OUTPATIENT
Start: 2025-01-01 | End: 2025-01-01 | Stop reason: HOSPADM

## 2025-01-01 RX ORDER — SODIUM CHLORIDE 9 MG/ML
INJECTION, SOLUTION INTRAVENOUS PRN
Status: DISCONTINUED | OUTPATIENT
Start: 2025-01-01 | End: 2025-01-01

## 2025-01-01 RX ORDER — SODIUM CHLORIDE 9 MG/ML
INJECTION, SOLUTION INTRAVENOUS PRN
Status: DISCONTINUED | OUTPATIENT
Start: 2025-01-01 | End: 2025-01-01 | Stop reason: HOSPADM

## 2025-01-01 RX ORDER — ACETAMINOPHEN 650 MG/1
650 SUPPOSITORY RECTAL EVERY 6 HOURS PRN
Status: DISCONTINUED | OUTPATIENT
Start: 2025-01-01 | End: 2025-01-01 | Stop reason: HOSPADM

## 2025-01-01 RX ORDER — FENTANYL CITRATE 50 UG/ML
50 INJECTION, SOLUTION INTRAMUSCULAR; INTRAVENOUS ONCE
Status: COMPLETED | OUTPATIENT
Start: 2025-01-01 | End: 2025-01-01

## 2025-01-01 RX ORDER — ACETAMINOPHEN 325 MG/1
650 TABLET ORAL EVERY 6 HOURS PRN
Status: DISCONTINUED | OUTPATIENT
Start: 2025-01-01 | End: 2025-01-01 | Stop reason: HOSPADM

## 2025-01-01 RX ADMIN — FENTANYL CITRATE 50 MCG: 50 INJECTION INTRAMUSCULAR; INTRAVENOUS at 17:50

## 2025-01-01 RX ADMIN — Medication 1 MG: at 23:09

## 2025-01-01 RX ADMIN — SODIUM CHLORIDE, PRESERVATIVE FREE 10 ML: 5 INJECTION INTRAVENOUS at 08:32

## 2025-01-01 RX ADMIN — PANTOPRAZOLE SODIUM 80 MG: 40 INJECTION, POWDER, FOR SOLUTION INTRAVENOUS at 16:00

## 2025-01-01 RX ADMIN — Medication 1 MG: at 15:20

## 2025-01-01 RX ADMIN — Medication 1 MG: at 09:43

## 2025-01-01 RX ADMIN — Medication 1 MG: at 07:23

## 2025-01-01 RX ADMIN — SODIUM CHLORIDE, PRESERVATIVE FREE 10 ML: 5 INJECTION INTRAVENOUS at 09:16

## 2025-01-01 RX ADMIN — MORPHINE SULFATE 2 MG: 2 INJECTION, SOLUTION INTRAMUSCULAR; INTRAVENOUS at 12:45

## 2025-01-01 RX ADMIN — MORPHINE SULFATE 2 MG: 2 INJECTION, SOLUTION INTRAMUSCULAR; INTRAVENOUS at 15:20

## 2025-01-01 RX ADMIN — SODIUM CHLORIDE, PRESERVATIVE FREE 10 ML: 5 INJECTION INTRAVENOUS at 23:38

## 2025-01-01 RX ADMIN — PANTOPRAZOLE SODIUM 8 MG/HR: 40 INJECTION, POWDER, FOR SOLUTION INTRAVENOUS at 11:16

## 2025-01-01 RX ADMIN — Medication 1 MG: at 21:07

## 2025-01-01 RX ADMIN — SODIUM CHLORIDE: 0.9 INJECTION, SOLUTION INTRAVENOUS at 20:19

## 2025-01-01 RX ADMIN — SODIUM CHLORIDE, PRESERVATIVE FREE 10 ML: 5 INJECTION INTRAVENOUS at 00:13

## 2025-01-01 RX ADMIN — BACITRACIN ZINC, NEOMYCIN, POLYMYXIN B SULFAT: 5000; 3.5; 4 OINTMENT TOPICAL at 17:54

## 2025-01-01 RX ADMIN — SODIUM CHLORIDE, PRESERVATIVE FREE 10 ML: 5 INJECTION INTRAVENOUS at 07:23

## 2025-01-01 RX ADMIN — Medication 1 MG: at 16:03

## 2025-01-01 RX ADMIN — MORPHINE SULFATE 4 MG: 4 INJECTION, SOLUTION INTRAMUSCULAR; INTRAVENOUS at 17:50

## 2025-01-01 RX ADMIN — SODIUM CHLORIDE, PRESERVATIVE FREE 10 ML: 5 INJECTION INTRAVENOUS at 21:08

## 2025-01-01 RX ADMIN — Medication 1 MG: at 04:07

## 2025-01-01 RX ADMIN — IOPAMIDOL 75 ML: 755 INJECTION, SOLUTION INTRAVENOUS at 15:32

## 2025-01-01 ASSESSMENT — ENCOUNTER SYMPTOMS
EYE PAIN: 0
ANAL BLEEDING: 0
DIARRHEA: 0
NAUSEA: 0
SORE THROAT: 0
BACK PAIN: 0
VOMITING: 0
ABDOMINAL PAIN: 0
SHORTNESS OF BREATH: 0
COUGH: 0

## 2025-01-01 ASSESSMENT — PAIN SCALES - WONG BAKER
WONGBAKER_NUMERICALRESPONSE: NO HURT
WONGBAKER_NUMERICALRESPONSE: HURTS WHOLE LOT
WONGBAKER_NUMERICALRESPONSE: NO HURT
WONGBAKER_NUMERICALRESPONSE: HURTS LITTLE MORE

## 2025-01-01 ASSESSMENT — PAIN SCALES - GENERAL
PAINLEVEL_OUTOF10: 0
PAINLEVEL_OUTOF10: 4
PAINLEVEL_OUTOF10: 0
PAINLEVEL_OUTOF10: 6

## 2025-01-01 ASSESSMENT — PAIN - FUNCTIONAL ASSESSMENT: PAIN_FUNCTIONAL_ASSESSMENT: 0-10

## 2025-01-03 ENCOUNTER — CARE COORDINATION (OUTPATIENT)
Dept: CARE COORDINATION | Age: 89
End: 2025-01-03

## 2025-01-03 NOTE — CARE COORDINATION
Care Transitions Note    Initial Call - Call within 2 business days of discharge: Yes    Attempted to reach patient for transitions of care follow up. Unable to reach patient. 2nd attempt at final PAC THIEN call. Left VM requesting call back at his convenience but this is final call. Encouraged call back if questions or concerns.   Enrollment ended    Outreach Attempts:   Multiple attempts to contact patient at phone numbers on file.     Needs to be reviewed by the provider   MIS SOLARES from SNF, need for 7-day THIEN appt     Method of communication with provider : chart routing             Patient: Ran Espinoza    Patient : 1931   MRN: <[id number]>    Reason for Admission: anemia (hgb 6.2), dyspnea on exertion, CHF (BNP- 4,088), hypoxia (85-88% on room air), BLE edema     Discharge Date: 24  RURS: Readmission Risk Score: 12.2    Last Discharge Facility       Date Complaint Diagnosis Description Type Department Provider    24 Shortness of Breath Anemia, unspecified type ... ED to Hosp-Admission (Discharged) (ADMITTED) ADRIANA AdamsN Jesika Interiano MD; Fabi Pérez...            Was this an external facility discharge? Yes. Discharge Date: . Facility Name: [facility]     Follow Up Appointment:   Patient does not have a follow up appointment scheduled at time of call.  Chart message to PCP to schedule THIEN appt       No further follow-up call indicated     SALO WONG RN

## 2025-02-05 ENCOUNTER — TELEPHONE (OUTPATIENT)
Dept: FAMILY MEDICINE CLINIC | Age: 89
End: 2025-02-05

## 2025-02-05 NOTE — TELEPHONE ENCOUNTER
Patient called in complaining of wheeping left knee. Clear discharge. Both of the patient's knees are swollen. A little pain. After 3 hours he has a puddle under that knee on the floor. The patient's right knee has a wound in the joint but has been addressed a couple weeks ago. When the patient's wakes up in the morning his pajamas are soaked. Patient offered to come in and be seen today.     Please advise.

## 2025-02-06 ENCOUNTER — APPOINTMENT (OUTPATIENT)
Dept: CT IMAGING | Age: 89
DRG: 521 | End: 2025-02-06
Payer: MEDICARE

## 2025-02-06 ENCOUNTER — APPOINTMENT (OUTPATIENT)
Dept: GENERAL RADIOLOGY | Age: 89
DRG: 521 | End: 2025-02-06
Payer: MEDICARE

## 2025-02-06 ENCOUNTER — HOSPITAL ENCOUNTER (INPATIENT)
Age: 89
LOS: 7 days | Discharge: NURSING, REHAB OR LONG-TERM CARE | DRG: 521 | End: 2025-02-13
Attending: EMERGENCY MEDICINE | Admitting: INTERNAL MEDICINE
Payer: MEDICARE

## 2025-02-06 DIAGNOSIS — I35.0 NONRHEUMATIC AORTIC VALVE STENOSIS: ICD-10-CM

## 2025-02-06 DIAGNOSIS — S72.002A CLOSED FRACTURE OF LEFT HIP, INITIAL ENCOUNTER (HCC): Primary | ICD-10-CM

## 2025-02-06 PROBLEM — S72.002S HIP FRACTURE REQUIRING OPERATIVE REPAIR, LEFT, SEQUELA: Status: ACTIVE | Noted: 2025-02-06

## 2025-02-06 LAB
ALBUMIN SERPL-MCNC: 3.4 G/DL (ref 3.4–5)
ALBUMIN/GLOB SERPL: 1.4 {RATIO} (ref 1.1–2.2)
ALP SERPL-CCNC: 124 U/L (ref 40–129)
ALT SERPL-CCNC: 10 U/L (ref 10–40)
ANION GAP SERPL CALCULATED.3IONS-SCNC: 10 MMOL/L (ref 3–16)
AST SERPL-CCNC: 30 U/L (ref 15–37)
BASOPHILS # BLD: 0 K/UL (ref 0–0.2)
BASOPHILS NFR BLD: 0.3 %
BILIRUB SERPL-MCNC: 0.4 MG/DL (ref 0–1)
BUN SERPL-MCNC: 28 MG/DL (ref 7–20)
CALCIUM SERPL-MCNC: 8.7 MG/DL (ref 8.3–10.6)
CHLORIDE SERPL-SCNC: 105 MMOL/L (ref 99–110)
CO2 SERPL-SCNC: 24 MMOL/L (ref 21–32)
CREAT SERPL-MCNC: 1 MG/DL (ref 0.8–1.3)
DEPRECATED RDW RBC AUTO: 16.7 % (ref 12.4–15.4)
EOSINOPHIL # BLD: 0 K/UL (ref 0–0.6)
EOSINOPHIL NFR BLD: 0.6 %
GFR SERPLBLD CREATININE-BSD FMLA CKD-EPI: 70 ML/MIN/{1.73_M2}
GLUCOSE SERPL-MCNC: 86 MG/DL (ref 70–99)
HCT VFR BLD AUTO: 30 % (ref 40.5–52.5)
HGB BLD-MCNC: 9 G/DL (ref 13.5–17.5)
IMM GRANULOCYTES # BLD: 0 K/UL (ref 0–0.2)
IMM GRANULOCYTES NFR BLD: 0.3 %
INR PPP: 1.02 (ref 0.85–1.15)
LYMPHOCYTES # BLD: 0.2 K/UL (ref 1–5.1)
LYMPHOCYTES NFR BLD: 3.1 %
MCH RBC QN AUTO: 30.9 PG (ref 26–34)
MCHC RBC AUTO-ENTMCNC: 30 G/DL (ref 32–36.4)
MCV RBC AUTO: 103.1 FL (ref 80–100)
MONOCYTES # BLD: 0.5 K/UL (ref 0–1.3)
MONOCYTES NFR BLD: 7.4 %
NEUTROPHILS # BLD: 6.3 K/UL (ref 1.7–7.7)
NEUTROPHILS NFR BLD: 88.3 %
PLATELET # BLD AUTO: 165 K/UL (ref 135–450)
PMV BLD AUTO: 9.2 FL (ref 5–10.5)
POTASSIUM SERPL-SCNC: 4.4 MMOL/L (ref 3.5–5.1)
PROT SERPL-MCNC: 5.8 G/DL (ref 6.4–8.2)
PROTHROMBIN TIME: 13.6 SEC (ref 11.9–14.9)
RBC # BLD AUTO: 2.91 M/UL (ref 4.2–5.9)
SODIUM SERPL-SCNC: 139 MMOL/L (ref 136–145)
WBC # BLD AUTO: 7.1 K/UL (ref 4–11)

## 2025-02-06 PROCEDURE — 99285 EMERGENCY DEPT VISIT HI MDM: CPT

## 2025-02-06 PROCEDURE — 80053 COMPREHEN METABOLIC PANEL: CPT

## 2025-02-06 PROCEDURE — 93005 ELECTROCARDIOGRAM TRACING: CPT | Performed by: EMERGENCY MEDICINE

## 2025-02-06 PROCEDURE — 99222 1ST HOSP IP/OBS MODERATE 55: CPT | Performed by: NURSE PRACTITIONER

## 2025-02-06 PROCEDURE — 36415 COLL VENOUS BLD VENIPUNCTURE: CPT

## 2025-02-06 PROCEDURE — 70450 CT HEAD/BRAIN W/O DYE: CPT

## 2025-02-06 PROCEDURE — 6360000002 HC RX W HCPCS: Performed by: NURSE PRACTITIONER

## 2025-02-06 PROCEDURE — 6370000000 HC RX 637 (ALT 250 FOR IP): Performed by: INTERNAL MEDICINE

## 2025-02-06 PROCEDURE — 2500000003 HC RX 250 WO HCPCS: Performed by: INTERNAL MEDICINE

## 2025-02-06 PROCEDURE — 73700 CT LOWER EXTREMITY W/O DYE: CPT

## 2025-02-06 PROCEDURE — 6370000000 HC RX 637 (ALT 250 FOR IP): Performed by: EMERGENCY MEDICINE

## 2025-02-06 PROCEDURE — 85025 COMPLETE CBC W/AUTO DIFF WBC: CPT

## 2025-02-06 PROCEDURE — 85610 PROTHROMBIN TIME: CPT

## 2025-02-06 PROCEDURE — 94760 N-INVAS EAR/PLS OXIMETRY 1: CPT

## 2025-02-06 PROCEDURE — 72125 CT NECK SPINE W/O DYE: CPT

## 2025-02-06 PROCEDURE — 2060000000 HC ICU INTERMEDIATE R&B

## 2025-02-06 RX ORDER — POTASSIUM CHLORIDE 1500 MG/1
40 TABLET, EXTENDED RELEASE ORAL PRN
Status: DISCONTINUED | OUTPATIENT
Start: 2025-02-06 | End: 2025-02-13 | Stop reason: HOSPADM

## 2025-02-06 RX ORDER — ACETAMINOPHEN 325 MG/1
650 TABLET ORAL EVERY 6 HOURS PRN
Status: DISCONTINUED | OUTPATIENT
Start: 2025-02-06 | End: 2025-02-07

## 2025-02-06 RX ORDER — ONDANSETRON 2 MG/ML
4 INJECTION INTRAMUSCULAR; INTRAVENOUS EVERY 6 HOURS PRN
Status: DISCONTINUED | OUTPATIENT
Start: 2025-02-06 | End: 2025-02-13 | Stop reason: HOSPADM

## 2025-02-06 RX ORDER — ONDANSETRON 4 MG/1
4 TABLET, ORALLY DISINTEGRATING ORAL EVERY 8 HOURS PRN
Status: DISCONTINUED | OUTPATIENT
Start: 2025-02-06 | End: 2025-02-13 | Stop reason: HOSPADM

## 2025-02-06 RX ORDER — ASPIRIN 81 MG/1
81 TABLET, CHEWABLE ORAL DAILY
Status: DISCONTINUED | OUTPATIENT
Start: 2025-02-06 | End: 2025-02-13 | Stop reason: HOSPADM

## 2025-02-06 RX ORDER — ASCORBIC ACID 500 MG
500 TABLET ORAL DAILY
Status: DISCONTINUED | OUTPATIENT
Start: 2025-02-06 | End: 2025-02-13 | Stop reason: HOSPADM

## 2025-02-06 RX ORDER — SODIUM CHLORIDE 0.9 % (FLUSH) 0.9 %
5-40 SYRINGE (ML) INJECTION EVERY 12 HOURS SCHEDULED
Status: DISCONTINUED | OUTPATIENT
Start: 2025-02-06 | End: 2025-02-07

## 2025-02-06 RX ORDER — ACETAMINOPHEN 325 MG/1
650 TABLET ORAL ONCE
Status: COMPLETED | OUTPATIENT
Start: 2025-02-06 | End: 2025-02-06

## 2025-02-06 RX ORDER — SODIUM CHLORIDE 9 MG/ML
INJECTION, SOLUTION INTRAVENOUS PRN
Status: DISCONTINUED | OUTPATIENT
Start: 2025-02-06 | End: 2025-02-13 | Stop reason: HOSPADM

## 2025-02-06 RX ORDER — FERROUS SULFATE 325(65) MG
325 TABLET ORAL 2 TIMES DAILY WITH MEALS
Status: DISCONTINUED | OUTPATIENT
Start: 2025-02-06 | End: 2025-02-13 | Stop reason: HOSPADM

## 2025-02-06 RX ORDER — MAGNESIUM SULFATE IN WATER 40 MG/ML
2000 INJECTION, SOLUTION INTRAVENOUS PRN
Status: DISCONTINUED | OUTPATIENT
Start: 2025-02-06 | End: 2025-02-13 | Stop reason: HOSPADM

## 2025-02-06 RX ORDER — POTASSIUM CHLORIDE 7.45 MG/ML
10 INJECTION INTRAVENOUS PRN
Status: DISCONTINUED | OUTPATIENT
Start: 2025-02-06 | End: 2025-02-13 | Stop reason: HOSPADM

## 2025-02-06 RX ORDER — ENOXAPARIN SODIUM 100 MG/ML
40 INJECTION SUBCUTANEOUS DAILY
Status: DISCONTINUED | OUTPATIENT
Start: 2025-02-06 | End: 2025-02-13 | Stop reason: HOSPADM

## 2025-02-06 RX ORDER — ACETAMINOPHEN 650 MG/1
650 SUPPOSITORY RECTAL EVERY 6 HOURS PRN
Status: DISCONTINUED | OUTPATIENT
Start: 2025-02-06 | End: 2025-02-07

## 2025-02-06 RX ORDER — POLYETHYLENE GLYCOL 3350 17 G/17G
17 POWDER, FOR SOLUTION ORAL DAILY PRN
Status: DISCONTINUED | OUTPATIENT
Start: 2025-02-06 | End: 2025-02-13 | Stop reason: HOSPADM

## 2025-02-06 RX ORDER — SODIUM CHLORIDE 0.9 % (FLUSH) 0.9 %
5-40 SYRINGE (ML) INJECTION PRN
Status: DISCONTINUED | OUTPATIENT
Start: 2025-02-06 | End: 2025-02-07

## 2025-02-06 RX ADMIN — ENOXAPARIN SODIUM 40 MG: 100 INJECTION SUBCUTANEOUS at 15:23

## 2025-02-06 RX ADMIN — SODIUM CHLORIDE, PRESERVATIVE FREE 10 ML: 5 INJECTION INTRAVENOUS at 21:04

## 2025-02-06 RX ADMIN — POLYETHYLENE GLYCOL 3350 17 G: 17 POWDER, FOR SOLUTION ORAL at 21:53

## 2025-02-06 RX ADMIN — OXYCODONE HYDROCHLORIDE AND ACETAMINOPHEN 500 MG: 500 TABLET ORAL at 15:23

## 2025-02-06 RX ADMIN — ACETAMINOPHEN 650 MG: 325 TABLET ORAL at 15:23

## 2025-02-06 RX ADMIN — ASPIRIN 81 MG CHEWABLE TABLET 81 MG: 81 TABLET CHEWABLE at 15:23

## 2025-02-06 RX ADMIN — ACETAMINOPHEN 650 MG: 325 TABLET ORAL at 09:53

## 2025-02-06 RX ADMIN — FERROUS SULFATE TAB 325 MG (65 MG ELEMENTAL FE) 325 MG: 325 (65 FE) TAB at 17:29

## 2025-02-06 ASSESSMENT — PAIN DESCRIPTION - ONSET
ONSET: ON-GOING
ONSET: ON-GOING

## 2025-02-06 ASSESSMENT — PAIN SCALES - GENERAL
PAINLEVEL_OUTOF10: 3
PAINLEVEL_OUTOF10: 4
PAINLEVEL_OUTOF10: 6

## 2025-02-06 ASSESSMENT — PAIN DESCRIPTION - LOCATION
LOCATION: HIP

## 2025-02-06 ASSESSMENT — PAIN DESCRIPTION - FREQUENCY
FREQUENCY: CONTINUOUS
FREQUENCY: INTERMITTENT

## 2025-02-06 ASSESSMENT — PAIN DESCRIPTION - DESCRIPTORS
DESCRIPTORS: ACHING
DESCRIPTORS: SORE
DESCRIPTORS: SORE

## 2025-02-06 ASSESSMENT — PAIN DESCRIPTION - PAIN TYPE
TYPE: ACUTE PAIN
TYPE: ACUTE PAIN

## 2025-02-06 ASSESSMENT — PAIN - FUNCTIONAL ASSESSMENT
PAIN_FUNCTIONAL_ASSESSMENT: PREVENTS OR INTERFERES SOME ACTIVE ACTIVITIES AND ADLS
PAIN_FUNCTIONAL_ASSESSMENT: ACTIVITIES ARE NOT PREVENTED
PAIN_FUNCTIONAL_ASSESSMENT: PREVENTS OR INTERFERES SOME ACTIVE ACTIVITIES AND ADLS
PAIN_FUNCTIONAL_ASSESSMENT: 0-10

## 2025-02-06 ASSESSMENT — PAIN DESCRIPTION - ORIENTATION
ORIENTATION: LEFT

## 2025-02-06 NOTE — H&P
Hospital Medicine History & Physical      PCP: Wiliam Mahmood MD    Date of Admission: 2/6/2025    Date of Service: Pt seen/examined on 02/06/2025 and Admitted to Inpatient with expected LOS greater than two midnights due to medical therapy.   Chief Complaint: Hip pain      History Of Present Illness:      89 y.o. male who presented to [facility] with hip pain, had a mechanical fall at home of 2 he was falling asleep patient presented to ED initially with left hip pain 7 out of 10 intensity worse with any ambulation, imaging positive for hip fracture denies fever chills chest pain or shortness of breath.  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 Authorizing Provider   ferrous sulfate (IRON 325) 325 (65 Fe) MG tablet Take 1 tablet by mouth 2 times daily 12/25/24  Yes Jesika Interiano MD   aspirin 81 MG tablet Take 1 tablet by mouth daily   Yes Jovanna Pierre MD   Ascorbic Acid (VITAMIN C) 500 MG tablet Take 1 tablet by mouth daily   Yes Jovanna Pierre MD   Vitamin D (CHOLECALCIFEROL) 1000 UNITS CAPS capsule Take 1 capsule by mouth daily   Yes Jovanna Pierre MD   Multiple Vitamins-Minerals (MULTI COMPLETE PO) Take 1 tablet by mouth daily.   Yes Jovanna Pierre MD       Allergies:  Egg-derived products    Social History:      The

## 2025-02-06 NOTE — PROGRESS NOTES
4 Eyes Admission Assessment     I agree as the admission nurse that 2 RN's have performed a thorough Head to Toe Skin Assessment on the patient. ALL assessment sites listed below have been assessed on admission.       Areas assessed by both nurses:   [x]   Head, Face, and Ears   [x]   Shoulders, Back, and Chest  [x]   Arms, Elbows, and Hands   [x]   Coccyx, Sacrum, and Ischum  [x]   Legs, Feet, and Heels        Does the Patient have Skin Breakdown?  Yes a wound was noted on the Admission Assessment and an LDA was Initiated documentation include the Ximena-wound, Wound Assessment, Measurements, Dressing Treatment, Drainage, and Color\",         Harjit Prevention initiated:  Yes   Wound Care Orders initiated:  Yes      WOC nurse consulted for Pressure Injury (Stage 3,4, Unstageable, DTI, NWPT, and Complex wounds):  NA      Nurse 1 eSignature: Electronically signed by Shawnee Huynh RN on 2/6/25 at 5:50 PM EST    **SHARE this note so that the co-signing nurse is able to place an eSignature**    Nurse 2 eSignature: Electronically signed by Shena Prasad RN on 2/6/25 at 5:52 PM EST

## 2025-02-06 NOTE — ED PROVIDER NOTES
[facility]ERGENC[facility] DE[facility]MENT  [facility]      Pt Name: Ran Espinoza  MRN: [id number]  Birthdate 4/12/1931  Date ofevaluation: 2/6/2025  Provider: Aj Calderón MD    CHIEF COMPLAINT       Chief Complaint   Patient presents with    Fall    Hip Pain     Pt was sitting at the Kitchen table he fell asleep. He slipped off the chair and fell on the floor. He was on the floor 1 hour before he could get to his alarm and call 911. He hit his head. No loss of consciousness. C/o left hip pain.  Patient also has skin redness on abdomen and left lower leg.        HPI    HISTORY OF PRESENT ILLNESS   (Location/Symptom, Timing/Onset,Context/Setting, Quality, Duration, Modifying Factors, Severity)  Note limiting factors.   Ran Espinoza is a 89 y.o. male who presents to the emergency department with left hip pain.  This is a 89-year-old male who presents after falling at his home at his table after falling asleep.  The patient presents with left hip pain.  Apparently, the patient also slipped and hit his head.  There was no loss of consciousness.  He denies any other complaints.        NursingNotes were reviewed.    Review of Systems    REVIEW OF SYSTEMS    (2-9 systems for level 4, 10 or more for level 5)     Review of Systems   Constitutional: Negative for fever.   HENT: Negative for rhinorrhea and sore throat.    Eyes: Negative for redness.   Respiratory: Negative for shortness of breath.    Cardiovascular: Negative for chest pain.   Gastrointestinal: Negative for abdominal pain.   Genitourinary: Negative for flank pain.   Neurological: Negative for headaches.   Hematological: Negative for adenopathy.   Psychiatric/Behavioral: Negative for confusion.              Except as noted above the remainder of the review of systems was reviewed and negative.       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

## 2025-02-06 NOTE — ED NOTES
Pt is from home. He lives alone. His wife is in [facility] x 6 months. His children live out of state.He does have neighbors that check in on him. He uses a walker at home. He is alert and oriented x 4. He c/o left hip pain. He reports having left lower leg swelling with weeping at times.Left lower leg is red he reports having x 4 days. He has redness on his abdomen fold area with foul odor. Appears to be a yeast infection.

## 2025-02-06 NOTE — ED NOTES
Tiffany patient's daughter called. Patient states, okay to talk with her. Gave Tiffany update on patients dx and plan to transfer him to [facility].

## 2025-02-06 NOTE — ED NOTES
Patient has 0.5 cm round abrasion on top of his head. Cleaned area with normal saline and chlorhexidene soap

## 2025-02-06 NOTE — PROGRESS NOTES
Pt resting in bed this evening. He reports mild pain in left hip at rest. PRN Tylenol administered. Pulses palpable, skin warm to touch. He has moderate flexion in LLE. Pt is aware of NPO status at [facility] in preparation for surgery tomorrow. Consent reviewed and signed by pt, placed in chart. He is Sinus Arrhythmia on telemetry. Continue care.

## 2025-02-06 NOTE — PROGRESS NOTES
Pt arrived to room 3105 from ED.  Vital signs taken and were WNL.  Admission and assessment completed. Pt oriented to room, bed, phone, and call light. Fall precautions in place. Call light, bedside table and phone within easy reach. Pt instructed to use call light to call for assistance.

## 2025-02-06 NOTE — CONSULTS
[facility] Orthopedic Surgery  Consult Note    This patient is seen in consultation at the request of Dr Mcdowell    Reason for Consult:  left hip fracture    CHIEF COMPLAINT:  left hip pain    History Obtained From:  patient, electronic medical record    HISTORY OF PRESENT ILLNESS:    The patient is a 89 y.o. male who presents with left hip pain. He states he could not sleep and was up reading a book . He was getting up at the table and thinks he just fell asleep and fell to the floor. He reported new left hip pain.. Pain is described in left hip and with the intensity of moderate. Pain is described as aching. Discomfort is intermittent. Pain is worse with activity and better at rest. He reports he just started using a walker a few days ago.     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 Pt active plays basketball once a week       Family History   Problem Relation Age of Onset    Arthritis Mother     Heart Disease Father         pacemaker           Current Medications:   Current Facility-Administered Medications: ascorbic acid (VITAMIN C) tablet 500 mg, 500 mg, Oral, Daily  aspirin chewable tablet 81 mg, 81 mg, Oral, Daily  ferrous sulfate (IRON 325) tablet 325 mg, 325 mg, Oral, BID WC  sodium chloride flush 0.9 % injection 5-40 mL, 5-40 mL, IntraVENous, 2 times per day  sodium chloride flush 0.9  - CNP  2/6/2025  3:05 PM

## 2025-02-06 NOTE — PLAN OF CARE
Problem: Discharge Planning  Goal: Discharge to home or other facility with appropriate resources  Outcome: Progressing  Flowsheets (Taken 2/6/2025 1751)  Discharge to home or other facility with appropriate resources:   Identify barriers to discharge with patient and caregiver   Identify discharge learning needs (meds, wound care, etc)   Refer to discharge planning if patient needs post-hospital services based on physician order or complex needs related to functional status, cognitive ability or social support system   Arrange for needed discharge resources and transportation as appropriate     Problem: Pain  Goal: Verbalizes/displays adequate comfort level or baseline comfort level  Outcome: Progressing  Flowsheets (Taken 2/6/2025 1751)  Verbalizes/displays adequate comfort level or baseline comfort level:   Encourage patient to monitor pain and request assistance   Administer analgesics based on type and severity of pain and evaluate response   Consider cultural and social influences on pain and pain management   Assess pain using appropriate pain scale   Implement non-pharmacological measures as appropriate and evaluate response   Notify Licensed Independent Practitioner if interventions unsuccessful or patient reports new pain     Problem: Safety - Adult  Goal: Free from fall injury  Outcome: Progressing  Flowsheets (Taken 2/6/2025 1751)  Free From Fall Injury: Instruct family/caregiver on patient safety     Problem: ABCDS Injury Assessment  Goal: Absence of physical injury  Outcome: Progressing  Flowsheets (Taken 2/6/2025 1751)  Absence of Physical Injury: Implement safety measures based on patient assessment     Problem: Skin/Tissue Integrity  Goal: Skin integrity remains intact  Description: 1.  Monitor for areas of redness and/or skin breakdown  2.  Assess vascular access sites hourly  3.  Every 4-6 hours minimum:  Change oxygen saturation probe site  4.  Every 4-6 hours:  If on nasal continuous positive

## 2025-02-06 NOTE — ED NOTES
Dr. Calderón states, Hospitalist and Ortho both respond back to perfect . Waiting on patient admit orders.

## 2025-02-06 NOTE — ED NOTES
Report called to Whit florian on [address] west at [facility]. [facility] ETA is [phone]

## 2025-02-07 ENCOUNTER — APPOINTMENT (OUTPATIENT)
Age: 89
DRG: 521 | End: 2025-02-07
Payer: MEDICARE

## 2025-02-07 ENCOUNTER — ANESTHESIA EVENT (OUTPATIENT)
Dept: OPERATING ROOM | Age: 89
End: 2025-02-07
Payer: MEDICARE

## 2025-02-07 ENCOUNTER — ANESTHESIA (OUTPATIENT)
Dept: OPERATING ROOM | Age: 89
End: 2025-02-07
Payer: MEDICARE

## 2025-02-07 ENCOUNTER — APPOINTMENT (OUTPATIENT)
Dept: GENERAL RADIOLOGY | Age: 89
DRG: 521 | End: 2025-02-07
Payer: MEDICARE

## 2025-02-07 LAB
ABO + RH BLD: NORMAL
ALBUMIN SERPL-MCNC: 3.1 G/DL (ref 3.4–5)
ALBUMIN/GLOB SERPL: 1.6 {RATIO} (ref 1.1–2.2)
ALP SERPL-CCNC: 100 U/L (ref 40–129)
ALT SERPL-CCNC: 16 U/L (ref 10–40)
ANION GAP SERPL CALCULATED.3IONS-SCNC: 7 MMOL/L (ref 3–16)
AST SERPL-CCNC: 31 U/L (ref 15–37)
BASOPHILS # BLD: 0 K/UL (ref 0–0.2)
BASOPHILS NFR BLD: 0.2 %
BILIRUB SERPL-MCNC: 0.4 MG/DL (ref 0–1)
BLD GP AB SCN SERPL QL: NORMAL
BUN SERPL-MCNC: 27 MG/DL (ref 7–20)
CALCIUM SERPL-MCNC: 8 MG/DL (ref 8.3–10.6)
CHLORIDE SERPL-SCNC: 109 MMOL/L (ref 99–110)
CO2 SERPL-SCNC: 25 MMOL/L (ref 21–32)
CREAT SERPL-MCNC: 1 MG/DL (ref 0.8–1.3)
DEPRECATED RDW RBC AUTO: 17.9 % (ref 12.4–15.4)
ECHO AO ASC DIAM: 3.3 CM
ECHO AO ASCENDING AORTA INDEX: 1.84 CM/M2
ECHO AO ROOT DIAM: 3.9 CM
ECHO AO ROOT INDEX: 2.18 CM/M2
ECHO AR MAX VEL PISA: 4.6 M/S
ECHO AV AREA PEAK VELOCITY: 0.6 CM2
ECHO AV AREA VTI: 0.7 CM2
ECHO AV AREA/BSA PEAK VELOCITY: 0.3 CM2/M2
ECHO AV AREA/BSA VTI: 0.4 CM2/M2
ECHO AV MEAN GRADIENT: 33 MMHG
ECHO AV MEAN VELOCITY: 2.6 M/S
ECHO AV PEAK GRADIENT: 58 MMHG
ECHO AV PEAK VELOCITY: 3.8 M/S
ECHO AV REGURGITANT PHT: 536 MS
ECHO AV VELOCITY RATIO: 0.21
ECHO AV VTI: 69.7 CM
ECHO BSA: 1.78 M2
ECHO LA AREA 2C: 27.3 CM2
ECHO LA AREA 4C: 30.4 CM2
ECHO LA DIAMETER INDEX: 2.18 CM/M2
ECHO LA DIAMETER: 3.9 CM
ECHO LA MAJOR AXIS: 6.4 CM
ECHO LA MINOR AXIS: 6 CM
ECHO LA TO AORTIC ROOT RATIO: 1
ECHO LA VOL BP: 105 ML (ref 18–58)
ECHO LA VOL MOD A2C: 96 ML (ref 18–58)
ECHO LA VOL MOD A4C: 110 ML (ref 18–58)
ECHO LA VOL/BSA BIPLANE: 59 ML/M2 (ref 16–34)
ECHO LA VOLUME INDEX MOD A2C: 54 ML/M2 (ref 16–34)
ECHO LA VOLUME INDEX MOD A4C: 61 ML/M2 (ref 16–34)
ECHO LV E' LATERAL VELOCITY: 6.46 CM/S
ECHO LV E' SEPTAL VELOCITY: 5.29 CM/S
ECHO LV EF PHYSICIAN: 60 %
ECHO LV FRACTIONAL SHORTENING: 24 % (ref 28–44)
ECHO LV INTERNAL DIMENSION DIASTOLE INDEX: 2.74 CM/M2
ECHO LV INTERNAL DIMENSION DIASTOLIC: 4.9 CM (ref 4.2–5.9)
ECHO LV INTERNAL DIMENSION SYSTOLIC INDEX: 2.07 CM/M2
ECHO LV INTERNAL DIMENSION SYSTOLIC: 3.7 CM
ECHO LV IVSD: 1.3 CM (ref 0.6–1)
ECHO LV MASS 2D: 200.5 G (ref 88–224)
ECHO LV MASS INDEX 2D: 112 G/M2 (ref 49–115)
ECHO LV POSTERIOR WALL DIASTOLIC: 0.9 CM (ref 0.6–1)
ECHO LV RELATIVE WALL THICKNESS RATIO: 0.37
ECHO LVOT AREA: 2.8 CM2
ECHO LVOT AV VTI INDEX: 0.25
ECHO LVOT DIAM: 1.9 CM
ECHO LVOT MEAN GRADIENT: 1 MMHG
ECHO LVOT PEAK GRADIENT: 2 MMHG
ECHO LVOT PEAK VELOCITY: 0.8 M/S
ECHO LVOT STROKE VOLUME INDEX: 27.9 ML/M2
ECHO LVOT SV: 49.9 ML
ECHO LVOT VTI: 17.6 CM
ECHO MV A VELOCITY: 1.1 M/S
ECHO MV AREA VTI: 0.2 CM2
ECHO MV E DECELERATION TIME (DT): 279 MS
ECHO MV E VELOCITY: 0.58 M/S
ECHO MV E/A RATIO: 0.53
ECHO MV E/E' LATERAL: 8.98
ECHO MV E/E' RATIO (AVERAGED): 9.97
ECHO MV E/E' SEPTAL: 10.96
ECHO MV LVOT VTI INDEX: 12.95
ECHO MV MAX VELOCITY: 6.8 M/S
ECHO MV MEAN GRADIENT: 122 MMHG
ECHO MV MEAN VELOCITY: 5.3 M/S
ECHO MV PEAK GRADIENT: 187 MMHG
ECHO MV REGURGITANT ALIASING (NYQUIST) VELOCITY: 22 CM/S
ECHO MV REGURGITANT RADIUS PISA: 0.78 CM
ECHO MV VTI: 228 CM
ECHO PULMONARY ARTERY END DIASTOLIC PRESSURE: 6 MMHG
ECHO PV MAX VELOCITY: 1.9 M/S
ECHO PV PEAK GRADIENT: 15 MMHG
ECHO PV REGURGITANT END DIASTOLIC MAX VELOCITY: 1.2 M/S
ECHO RA AREA 4C: 13.2 CM2
ECHO RA END SYSTOLIC VOLUME APICAL 4 CHAMBER INDEX BSA: 16 ML/M2
ECHO RA VOLUME: 28 ML
ECHO RV INTERNAL DIMENSION: 3.4 CM
ECHO TV REGURGITANT MAX VELOCITY: 3.53 M/S
ECHO TV REGURGITANT PEAK GRADIENT: 50 MMHG
EKG ATRIAL RATE: 70 BPM
EKG DIAGNOSIS: NORMAL
EKG P AXIS: 50 DEGREES
EKG P-R INTERVAL: 96 MS
EKG Q-T INTERVAL: 416 MS
EKG QRS DURATION: 100 MS
EKG QTC CALCULATION (BAZETT): 449 MS
EKG R AXIS: 3 DEGREES
EKG T AXIS: 30 DEGREES
EKG VENTRICULAR RATE: 70 BPM
EOSINOPHIL # BLD: 0 K/UL (ref 0–0.6)
EOSINOPHIL NFR BLD: 0.1 %
GFR SERPLBLD CREATININE-BSD FMLA CKD-EPI: 70 ML/MIN/{1.73_M2}
GLUCOSE SERPL-MCNC: 118 MG/DL (ref 70–99)
HCT VFR BLD AUTO: 25.4 % (ref 40.5–52.5)
HGB BLD-MCNC: 8.4 G/DL (ref 13.5–17.5)
LYMPHOCYTES # BLD: 0.2 K/UL (ref 1–5.1)
LYMPHOCYTES NFR BLD: 2.5 %
MCH RBC QN AUTO: 32 PG (ref 26–34)
MCHC RBC AUTO-ENTMCNC: 32.9 G/DL (ref 31–36)
MCV RBC AUTO: 97.2 FL (ref 80–100)
MONOCYTES # BLD: 0.6 K/UL (ref 0–1.3)
MONOCYTES NFR BLD: 8.6 %
NEUTROPHILS # BLD: 6.1 K/UL (ref 1.7–7.7)
NEUTROPHILS NFR BLD: 88.6 %
PLATELET # BLD AUTO: 183 K/UL (ref 135–450)
PMV BLD AUTO: 7.2 FL (ref 5–10.5)
POTASSIUM SERPL-SCNC: 4.1 MMOL/L (ref 3.5–5.1)
PROT SERPL-MCNC: 5.1 G/DL (ref 6.4–8.2)
RBC # BLD AUTO: 2.62 M/UL (ref 4.2–5.9)
SODIUM SERPL-SCNC: 141 MMOL/L (ref 136–145)
WBC # BLD AUTO: 6.9 K/UL (ref 4–11)

## 2025-02-07 PROCEDURE — 93306 TTE W/DOPPLER COMPLETE: CPT

## 2025-02-07 PROCEDURE — 2500000003 HC RX 250 WO HCPCS: Performed by: ANESTHESIOLOGY

## 2025-02-07 PROCEDURE — 3600000015 HC SURGERY LEVEL 5 ADDTL 15MIN: Performed by: ORTHOPAEDIC SURGERY

## 2025-02-07 PROCEDURE — 2500000003 HC RX 250 WO HCPCS

## 2025-02-07 PROCEDURE — 2580000003 HC RX 258: Performed by: NURSE PRACTITIONER

## 2025-02-07 PROCEDURE — 86850 RBC ANTIBODY SCREEN: CPT

## 2025-02-07 PROCEDURE — 85025 COMPLETE CBC W/AUTO DIFF WBC: CPT

## 2025-02-07 PROCEDURE — 3700000000 HC ANESTHESIA ATTENDED CARE: Performed by: ORTHOPAEDIC SURGERY

## 2025-02-07 PROCEDURE — 99232 SBSQ HOSP IP/OBS MODERATE 35: CPT | Performed by: ORTHOPAEDIC SURGERY

## 2025-02-07 PROCEDURE — 6370000000 HC RX 637 (ALT 250 FOR IP): Performed by: INTERNAL MEDICINE

## 2025-02-07 PROCEDURE — 36415 COLL VENOUS BLD VENIPUNCTURE: CPT

## 2025-02-07 PROCEDURE — 73502 X-RAY EXAM HIP UNI 2-3 VIEWS: CPT

## 2025-02-07 PROCEDURE — 6370000000 HC RX 637 (ALT 250 FOR IP): Performed by: ORTHOPAEDIC SURGERY

## 2025-02-07 PROCEDURE — C1776 JOINT DEVICE (IMPLANTABLE): HCPCS | Performed by: ORTHOPAEDIC SURGERY

## 2025-02-07 PROCEDURE — 27236 TREAT THIGH FRACTURE: CPT | Performed by: ORTHOPAEDIC SURGERY

## 2025-02-07 PROCEDURE — 2060000000 HC ICU INTERMEDIATE R&B

## 2025-02-07 PROCEDURE — 2709999900 HC NON-CHARGEABLE SUPPLY: Performed by: ORTHOPAEDIC SURGERY

## 2025-02-07 PROCEDURE — 86900 BLOOD TYPING SEROLOGIC ABO: CPT

## 2025-02-07 PROCEDURE — 6360000002 HC RX W HCPCS: Performed by: ORTHOPAEDIC SURGERY

## 2025-02-07 PROCEDURE — 86923 COMPATIBILITY TEST ELECTRIC: CPT

## 2025-02-07 PROCEDURE — 86901 BLOOD TYPING SEROLOGIC RH(D): CPT

## 2025-02-07 PROCEDURE — 7100000001 HC PACU RECOVERY - ADDTL 15 MIN: Performed by: ORTHOPAEDIC SURGERY

## 2025-02-07 PROCEDURE — 2720000010 HC SURG SUPPLY STERILE: Performed by: ORTHOPAEDIC SURGERY

## 2025-02-07 PROCEDURE — 3700000001 HC ADD 15 MINUTES (ANESTHESIA): Performed by: ORTHOPAEDIC SURGERY

## 2025-02-07 PROCEDURE — 6360000002 HC RX W HCPCS: Performed by: NURSE PRACTITIONER

## 2025-02-07 PROCEDURE — 3600000005 HC SURGERY LEVEL 5 BASE: Performed by: ORTHOPAEDIC SURGERY

## 2025-02-07 PROCEDURE — 7100000000 HC PACU RECOVERY - FIRST 15 MIN: Performed by: ORTHOPAEDIC SURGERY

## 2025-02-07 PROCEDURE — 6360000002 HC RX W HCPCS: Performed by: ANESTHESIOLOGY

## 2025-02-07 PROCEDURE — 2500000003 HC RX 250 WO HCPCS: Performed by: ORTHOPAEDIC SURGERY

## 2025-02-07 PROCEDURE — 93306 TTE W/DOPPLER COMPLETE: CPT | Performed by: INTERNAL MEDICINE

## 2025-02-07 PROCEDURE — 80053 COMPREHEN METABOLIC PANEL: CPT

## 2025-02-07 PROCEDURE — 93010 ELECTROCARDIOGRAM REPORT: CPT | Performed by: INTERNAL MEDICINE

## 2025-02-07 PROCEDURE — 0SRS0JZ REPLACEMENT OF LEFT HIP JOINT, FEMORAL SURFACE WITH SYNTHETIC SUBSTITUTE, OPEN APPROACH: ICD-10-PCS | Performed by: ORTHOPAEDIC SURGERY

## 2025-02-07 DEVICE — STEM FEM STD OFFSET 6 HIP CLLRD HA AVENIR COMPLETE: Type: IMPLANTABLE DEVICE | Site: HIP | Status: FUNCTIONAL

## 2025-02-07 DEVICE — IMPLANTABLE DEVICE: Type: IMPLANTABLE DEVICE | Site: HIP | Status: FUNCTIONAL

## 2025-02-07 RX ORDER — MEPERIDINE HYDROCHLORIDE 25 MG/ML
12.5 INJECTION INTRAMUSCULAR; INTRAVENOUS; SUBCUTANEOUS
Status: DISCONTINUED | OUTPATIENT
Start: 2025-02-07 | End: 2025-02-07 | Stop reason: HOSPADM

## 2025-02-07 RX ORDER — SODIUM CHLORIDE 0.9 % (FLUSH) 0.9 %
5-40 SYRINGE (ML) INJECTION EVERY 12 HOURS SCHEDULED
Status: DISCONTINUED | OUTPATIENT
Start: 2025-02-07 | End: 2025-02-07 | Stop reason: HOSPADM

## 2025-02-07 RX ORDER — FENTANYL CITRATE 50 UG/ML
INJECTION, SOLUTION INTRAMUSCULAR; INTRAVENOUS
Status: DISCONTINUED | OUTPATIENT
Start: 2025-02-07 | End: 2025-02-07 | Stop reason: SDUPTHER

## 2025-02-07 RX ORDER — PROPOFOL 10 MG/ML
INJECTION, EMULSION INTRAVENOUS
Status: DISCONTINUED | OUTPATIENT
Start: 2025-02-07 | End: 2025-02-07 | Stop reason: SDUPTHER

## 2025-02-07 RX ORDER — PHENYLEPHRINE HCL IN 0.9% NACL 1 MG/10 ML
SYRINGE (ML) INTRAVENOUS
Status: DISCONTINUED | OUTPATIENT
Start: 2025-02-07 | End: 2025-02-07 | Stop reason: SDUPTHER

## 2025-02-07 RX ORDER — FENTANYL CITRATE 0.05 MG/ML
50 INJECTION, SOLUTION INTRAMUSCULAR; INTRAVENOUS EVERY 5 MIN PRN
Status: DISCONTINUED | OUTPATIENT
Start: 2025-02-07 | End: 2025-02-07 | Stop reason: HOSPADM

## 2025-02-07 RX ORDER — FENTANYL CITRATE 0.05 MG/ML
25 INJECTION, SOLUTION INTRAMUSCULAR; INTRAVENOUS EVERY 5 MIN PRN
Status: DISCONTINUED | OUTPATIENT
Start: 2025-02-07 | End: 2025-02-07 | Stop reason: HOSPADM

## 2025-02-07 RX ORDER — ACETAMINOPHEN 500 MG
1000 TABLET ORAL EVERY 8 HOURS SCHEDULED
Status: DISCONTINUED | OUTPATIENT
Start: 2025-02-07 | End: 2025-02-13 | Stop reason: HOSPADM

## 2025-02-07 RX ORDER — SODIUM CHLORIDE 9 MG/ML
INJECTION, SOLUTION INTRAVENOUS PRN
Status: DISCONTINUED | OUTPATIENT
Start: 2025-02-07 | End: 2025-02-07 | Stop reason: HOSPADM

## 2025-02-07 RX ORDER — MAGNESIUM HYDROXIDE 1200 MG/15ML
LIQUID ORAL CONTINUOUS PRN
Status: COMPLETED | OUTPATIENT
Start: 2025-02-07 | End: 2025-02-07

## 2025-02-07 RX ORDER — SODIUM CHLORIDE 9 MG/ML
INJECTION, SOLUTION INTRAVENOUS PRN
Status: COMPLETED | OUTPATIENT
Start: 2025-02-07 | End: 2025-02-07

## 2025-02-07 RX ORDER — SODIUM CHLORIDE 0.9 % (FLUSH) 0.9 %
5-40 SYRINGE (ML) INJECTION PRN
Status: DISCONTINUED | OUTPATIENT
Start: 2025-02-07 | End: 2025-02-07 | Stop reason: HOSPADM

## 2025-02-07 RX ORDER — ONDANSETRON 2 MG/ML
4 INJECTION INTRAMUSCULAR; INTRAVENOUS
Status: DISCONTINUED | OUTPATIENT
Start: 2025-02-07 | End: 2025-02-07 | Stop reason: HOSPADM

## 2025-02-07 RX ORDER — VANCOMYCIN HYDROCHLORIDE 1 G/20ML
INJECTION, POWDER, LYOPHILIZED, FOR SOLUTION INTRAVENOUS
Status: COMPLETED | OUTPATIENT
Start: 2025-02-07 | End: 2025-02-07

## 2025-02-07 RX ORDER — TRANEXAMIC ACID 10 MG/ML
INJECTION, SOLUTION INTRAVENOUS
Status: DISCONTINUED | OUTPATIENT
Start: 2025-02-07 | End: 2025-02-07 | Stop reason: SDUPTHER

## 2025-02-07 RX ORDER — LIDOCAINE HYDROCHLORIDE 20 MG/ML
INJECTION, SOLUTION INFILTRATION; PERINEURAL
Status: DISCONTINUED | OUTPATIENT
Start: 2025-02-07 | End: 2025-02-07 | Stop reason: SDUPTHER

## 2025-02-07 RX ORDER — ROCURONIUM BROMIDE 10 MG/ML
INJECTION, SOLUTION INTRAVENOUS
Status: DISCONTINUED | OUTPATIENT
Start: 2025-02-07 | End: 2025-02-07 | Stop reason: SDUPTHER

## 2025-02-07 RX ORDER — NALOXONE HYDROCHLORIDE 0.4 MG/ML
INJECTION, SOLUTION INTRAMUSCULAR; INTRAVENOUS; SUBCUTANEOUS PRN
Status: DISCONTINUED | OUTPATIENT
Start: 2025-02-07 | End: 2025-02-07 | Stop reason: HOSPADM

## 2025-02-07 RX ORDER — EPHEDRINE SULFATE/0.9% NACL/PF 25 MG/5 ML
SYRINGE (ML) INTRAVENOUS
Status: DISCONTINUED | OUTPATIENT
Start: 2025-02-07 | End: 2025-02-07 | Stop reason: SDUPTHER

## 2025-02-07 RX ADMIN — PROPOFOL 50 MG: 10 INJECTION, EMULSION INTRAVENOUS at 15:14

## 2025-02-07 RX ADMIN — CEFAZOLIN SODIUM 2000 MG: 1 INJECTION, POWDER, FOR SOLUTION INTRAMUSCULAR; INTRAVENOUS at 15:10

## 2025-02-07 RX ADMIN — ROCURONIUM BROMIDE 50 MG: 10 INJECTION, SOLUTION INTRAVENOUS at 15:15

## 2025-02-07 RX ADMIN — ACETAMINOPHEN 1000 MG: 500 TABLET ORAL at 19:02

## 2025-02-07 RX ADMIN — Medication 100 MCG: at 15:34

## 2025-02-07 RX ADMIN — FERROUS SULFATE TAB 325 MG (65 MG ELEMENTAL FE) 325 MG: 325 (65 FE) TAB at 19:03

## 2025-02-07 RX ADMIN — SODIUM CHLORIDE: 9 INJECTION, SOLUTION INTRAVENOUS at 14:57

## 2025-02-07 RX ADMIN — TRANEXAMIC ACID 1 G: 10 INJECTION, SOLUTION INTRAVENOUS at 15:21

## 2025-02-07 RX ADMIN — EPHEDRINE SULFATE 5 MG: 5 INJECTION INTRAVENOUS at 15:41

## 2025-02-07 RX ADMIN — EPHEDRINE SULFATE 5 MG: 5 INJECTION INTRAVENOUS at 16:14

## 2025-02-07 RX ADMIN — FENTANYL CITRATE 25 MCG: 50 INJECTION INTRAMUSCULAR; INTRAVENOUS at 16:42

## 2025-02-07 RX ADMIN — FENTANYL CITRATE 100 MCG: 50 INJECTION INTRAMUSCULAR; INTRAVENOUS at 15:14

## 2025-02-07 RX ADMIN — SUGAMMADEX 200 MG: 100 INJECTION, SOLUTION INTRAVENOUS at 16:44

## 2025-02-07 RX ADMIN — LIDOCAINE HYDROCHLORIDE 80 MG: 20 INJECTION, SOLUTION INFILTRATION; PERINEURAL at 15:14

## 2025-02-07 RX ADMIN — ROCURONIUM BROMIDE 20 MG: 10 INJECTION, SOLUTION INTRAVENOUS at 15:48

## 2025-02-07 RX ADMIN — EPHEDRINE SULFATE 5 MG: 5 INJECTION INTRAVENOUS at 15:48

## 2025-02-07 RX ADMIN — Medication 200 MCG: at 15:21

## 2025-02-07 RX ADMIN — WATER 2000 MG: 1 INJECTION INTRAMUSCULAR; INTRAVENOUS; SUBCUTANEOUS at 22:50

## 2025-02-07 RX ADMIN — Medication 100 MCG: at 15:14

## 2025-02-07 ASSESSMENT — PAIN SCALES - GENERAL
PAINLEVEL_OUTOF10: 4
PAINLEVEL_OUTOF10: 0
PAINLEVEL_OUTOF10: 4

## 2025-02-07 ASSESSMENT — PAIN DESCRIPTION - PAIN TYPE
TYPE: ACUTE PAIN
TYPE: ACUTE PAIN

## 2025-02-07 ASSESSMENT — PAIN DESCRIPTION - DESCRIPTORS
DESCRIPTORS: ACHING;SORE
DESCRIPTORS: ACHING;SORE

## 2025-02-07 ASSESSMENT — PAIN DESCRIPTION - ORIENTATION
ORIENTATION: LEFT
ORIENTATION: LEFT

## 2025-02-07 ASSESSMENT — PAIN - FUNCTIONAL ASSESSMENT
PAIN_FUNCTIONAL_ASSESSMENT: PREVENTS OR INTERFERES SOME ACTIVE ACTIVITIES AND ADLS
PAIN_FUNCTIONAL_ASSESSMENT: 0-10
PAIN_FUNCTIONAL_ASSESSMENT: PREVENTS OR INTERFERES SOME ACTIVE ACTIVITIES AND ADLS

## 2025-02-07 ASSESSMENT — PAIN DESCRIPTION - LOCATION
LOCATION: HIP
LOCATION: HIP

## 2025-02-07 ASSESSMENT — PAIN DESCRIPTION - ONSET
ONSET: ON-GOING
ONSET: ON-GOING

## 2025-02-07 ASSESSMENT — PAIN DESCRIPTION - FREQUENCY
FREQUENCY: CONTINUOUS
FREQUENCY: CONTINUOUS

## 2025-02-07 ASSESSMENT — LIFESTYLE VARIABLES: SMOKING_STATUS: 0

## 2025-02-07 NOTE — PLAN OF CARE
Problem: Discharge Planning  Goal: Discharge to home or other facility with appropriate resources  2/6/2025 1933 by Davis Sierra RN  Outcome: Progressing  2/6/2025 1751 by Shawnee Huynh RN  Outcome: Progressing  Flowsheets (Taken 2/6/2025 1751)  Discharge to home or other facility with appropriate resources:   Identify barriers to discharge with patient and caregiver   Identify discharge learning needs (meds, wound care, etc)   Refer to discharge planning if patient needs post-hospital services based on physician order or complex needs related to functional status, cognitive ability or social support system   Arrange for needed discharge resources and transportation as appropriate     Problem: Pain  Goal: Verbalizes/displays adequate comfort level or baseline comfort level  2/6/2025 1933 by Davis Sierra RN  Outcome: Progressing  2/6/2025 1751 by Shawnee Huynh RN  Outcome: Progressing  Flowsheets (Taken 2/6/2025 1751)  Verbalizes/displays adequate comfort level or baseline comfort level:   Encourage patient to monitor pain and request assistance   Administer analgesics based on type and severity of pain and evaluate response   Consider cultural and social influences on pain and pain management   Assess pain using appropriate pain scale   Implement non-pharmacological measures as appropriate and evaluate response   Notify Licensed Independent Practitioner if interventions unsuccessful or patient reports new pain     Problem: Safety - Adult  Goal: Free from fall injury  2/6/2025 1933 by Davis Sierra RN  Outcome: Progressing  Flowsheets (Taken 2/6/2025 1932)  Free From Fall Injury: Instruct family/caregiver on patient safety  2/6/2025 1751 by Shawnee Huynh RN  Outcome: Progressing  Flowsheets (Taken 2/6/2025 1751)  Free From Fall Injury: Instruct family/caregiver on patient safety     Problem: ABCDS Injury Assessment  Goal: Absence of physical injury  2/6/2025 1933 by Mariela  2/6/2025 1751)  Return Mobility to Safest Level of Function:   Assess patient stability and activity tolerance for standing, transferring and ambulating with or without assistive devices   Assist with transfers and ambulation using safe patient handling equipment as needed   Ensure adequate protection for wounds/incisions during mobilization   Obtain physical therapy/occupational therapy consults as needed   Instruct patient/family in ordered activity level  Goal: Maintain proper alignment of affected body part  2/6/2025 1933 by Davis Sierra RN  Outcome: Progressing  2/6/2025 1751 by Shawnee Huynh RN  Outcome: Progressing  Flowsheets (Taken 2/6/2025 1751)  Maintain proper alignment of affected body part:   Support and protect limb and body alignment per provider's orders   Instruct and reinforce with patient and family use of appropriate assistive device and precautions (e.g. spinal or hip dislocation precautions)  Goal: Return ADL status to a safe level of function  2/6/2025 1933 by Davis Sierra RN  Outcome: Progressing  2/6/2025 1751 by Shawnee Huynh RN  Outcome: Progressing  Flowsheets (Taken 2/6/2025 1751)  Return ADL Status to a Safe Level of Function:   Administer medication as ordered   Obtain physical therapy/occupational therapy consults as needed   Assess activities of daily living deficits and provide assistive devices as needed   Assist and instruct patient to increase activity and self care as tolerated     Problem: Infection - Adult  Goal: Absence of infection at discharge  2/6/2025 1933 by Davis Sierra RN  Outcome: Progressing  2/6/2025 1751 by Shawnee Huynh RN  Outcome: Progressing  Flowsheets (Taken 2/6/2025 1751)  Absence of infection at discharge:   Assess and monitor for signs and symptoms of infection   Monitor lab/diagnostic results   Monitor all insertion sites i.e., indwelling lines, tubes and drains   Administer medications as ordered   Instruct and

## 2025-02-07 NOTE — PROGRESS NOTES
Patient resting in bed this morning with complaint of 4/10 pain to the L hip. Scheduled morning medications administered per orders. See eMAR for documentation of medication administration. Patient tolerated medications whole w/ water w/o complication. IV to the L forearm flushed w/o complication and is saline locked at this time. Alcohol cap in place.     Head to toe assessment completed and charted. See flowsheets for documentation.     Patient updated with plan of care for the shift, denies questions. Patient denies further physical/emotional needs at this time. Call light, telephone, and bed side table are within reach. Fall precautions in place. Will continue to monitor and assess.

## 2025-02-07 NOTE — BRIEF OP NOTE
Brief Postoperative Note      Patient: Ran Espinoza  YOB: 1931  MRN: [id number]    Date of Procedure: 2/7/2025    Pre-Op Diagnosis Codes:      * Closed fracture of left hip, initial encounter ([facility]) [S72.002A]    Post-Op Diagnosis: Same       Procedure(s):  LEFT HIP HEMIARTHROPLASTY ANTERIOR APPROACH    Surgeon(s):  Alex Lemons MD    Assistant:  First Assistant: Han Rocha RN    Anesthesia: General    Estimated Blood Loss (mL): less than 50     Complications: None    Specimens:   * No specimens in log *    Implants:  Implant Name Type Inv. Item Serial No.  Lot No. LRB No. Used Action   STEM FEM STD OFFSET 6 HIP CLLRD HA AVENIR COMPLETE - [id number]  STEM FEM STD OFFSET 6 HIP CLLRD HA AVENIR COMPLETE  [employer]- [id number] Left 1 Implanted   HEAD FEM DIA28 MM NK 7 MM COCR HIP 12/14 TAPR UNIV STRL - [id number]  HEAD FEM DIA28 MM NK 7 MM COCR HIP 12/14 TAPR UNIV STRL  [employer]- [id number] Left 1 Implanted   SHELL BPLR OD55MM ID28MM HIP CO CHROM RINGLOC - [id number]  SHELL BPLR OD55MM ID28MM HIP CO CHROM RINGLOC  [employer]- [id number] Left 1 Implanted         Drains: * No LDAs found *    Findings:  Infection Present At Time Of Surgery (PATOS) (choose all levels that have infection present):  No infection present  Other Findings: none    Plan of care:   Weight bearing status- as tolerated on operative extremity   Precautions- none   Pain control- Tylenol 1000mg TID, opioids sparingly, PRN   Mead- none   Drain- none   Wound care- monocryl, aquacel   Antibiotics- perioperative    DVT ppx- ASA BID x 6 weeks  Dispo- home pending ptot   Follow up- 2 weeks for wound check

## 2025-02-07 NOTE — ACP (ADVANCE CARE PLANNING)
Advance Care Planning   Healthcare Decision Maker:    Primary Decision Maker: Bailee Espinoza - Spouse - [phone]    Secondary Decision Maker: Alicia Roberts - Child - [phone]    Secondary Decision Maker: ItzelTiffany - Child - [phone]    Respectfully submitted,    Twila ROSEN, LISW-S  [facility]   [phone]    Electronically signed by SILAS Maldonado, LSW on 2/7/2025 at 12:06 PM

## 2025-02-07 NOTE — CARE COORDINATION
SW attempted to meet with patient to review possible discharge needs, however, he was asleep. DELMIS will try again later if timer permits.     Respectfully submitted,    Twila ROSEN, MAGDALENE  [facility]   [phone]    Electronically signed by SILAS Maldonado, ZINA on 2/7/2025 at 11:55 AM

## 2025-02-07 NOTE — PROGRESS NOTES
IntraVENous, PRN  potassium chloride (KLOR-CON M) extended release tablet 40 mEq, 40 mEq, Oral, PRN **OR** potassium bicarb-citric acid (EFFER-K) effervescent tablet 40 mEq, 40 mEq, Oral, PRN **OR** potassium chloride 10 mEq/100 mL IVPB (Peripheral Line), 10 mEq, IntraVENous, PRN  magnesium sulfate 2000 mg in 50 mL IVPB premix, 2,000 mg, IntraVENous, PRN  enoxaparin (LOVENOX) injection 40 mg, 40 mg, SubCUTAneous, Daily  ondansetron (ZOFRAN-ODT) disintegrating tablet 4 mg, 4 mg, Oral, Q8H PRN **OR** ondansetron (ZOFRAN) injection 4 mg, 4 mg, IntraVENous, Q6H PRN  polyethylene glycol (GLYCOLAX) packet 17 g, 17 g, Oral, Daily PRN  acetaminophen (TYLENOL) tablet 650 mg, 650 mg, Oral, Q6H PRN **OR** acetaminophen (TYLENOL) suppository 650 mg, 650 mg, Rectal, Q6H PRN  ceFAZolin (ANCEF) 2,000 mg in sodium chloride 0.9 % 50 mL IVPB (mini-bag), 2,000 mg, IntraVENous, On Call to OR    ASSESSMENT AND PLAN    Fall  Left hip pain  Left subcapital hip fx, impacted.   Plan ORIF left hip per Dr Alex Lemons today  NPO . Pt agreeable to proceed to OR today    I saw and evaluated the patient and reviewed the APPs note. I agree with the APPs findings and plan.    89-year-old male with displaced left femoral neck fracture.    I had a long discussion with the daughter patient regarding perioperative risk they will rescind his DNR at the time of surgery.    We discussed the diagnosis and treatment options in detail with the daughter, patient. I explained the nature of their hip fracture and poor outcomes with nonoperative treatment as well as open reduction internal fixation  We discussed the surgical procedure, recovery period, and protocol for pain management, expected post-operative course in detail.    We discussed the potential benefits of the surgery including improved mobility, pain relief as well as the inherent risks including but are not limited to infection, dislocation, leg length inequality, hardware failure, nerve or vascular  injury, need for further surgery, deep vein thrombus, pulmonary embolism.   The patient/daughter has verbalized understanding of these risks and wishes to proceed.    Greater than 35 minutes was spent preparing to see the patient, reviewing prior records, independently evaluating prior imaging, performing necessary examination and history, counseling about treatment options and performing clinical documentation.

## 2025-02-07 NOTE — PROGRESS NOTES
Patient returned to unit from echo - transported via bed by this RN and transporter Lyn. Patient resting in bed w/ eyes closed, no s/s of distress noted.

## 2025-02-07 NOTE — PROGRESS NOTES
V2.0    [facility] Progress Note      Name:  Ran Espinoza /Age/Sex: 1931  (89 y.o. male)   MRN & CSN:  [id number] & [id number] Encounter Date/Time: 2025 8:03 AM EST   Location:  [address]- PCP: Wiliam Mahmood MD     Attending:Adonis Mcdowell MD       Hospital Day: 2    Assessment and Recommendations   Ran Espinoza is a 89 y.o. male who presents with Closed fracture of left hip (HCC)    89-year-old patient admitted to the hospital with hip fracture Ortho consulted pain management continue to feel okay    Plan:     Left hip fracture, mechanical, pain management, orthopedic surgery consulted will follow further recommendations.  Anemia, appears chronic hemoglobin 9.0 on admission down to 8.4 this morning continue to monitor could be worsening with expected local bleeding due to fracture  History of anemia no signs of bleeding  Heart murmur  Generalized weakness PT OT when okay with Ortho        Diet Diet NPO Exceptions are: Sips of Water with Meds   DVT Prophylaxis [] Lovenox, []  Heparin, [] SCDs, [] Ambulation,  [] Eliquis, [] Xarelto  [] Coumadin   Code Status DNR-CC             Personally reviewed Lab Studies and Imaging     Discussed management of the case with Ortho who recommended surgery    Rhythm strip independently interpreted by myself heart rate 79 HI 0 14 QT 0 39    Medical Decision Making:  The following items were considered in medical decision making:  Discussion of patient care with other providers  Reviewed clinical lab tests  Reviewed radiology tests  Reviewed other diagnostic tests/interventions  Independent review of radiologic images  Microbiology cultures and other micro tests reviewed      Subjective:     Chief Complaint: Hip pain    Ran Espinoza is a 89 y.o. male who presents with hip pain overall controlled at this time no fever chills nausea vomiting      Review of Systems:      Pertinent positives and negatives discussed in HPI    Objective:     Intake/Output  Summary (Last 24 hours) at 2/7/2025 0803  Last data filed at 2/7/2025 0403  Gross per 24 hour   Intake 440 ml   Output 450 ml   Net -10 ml      Vitals:   Vitals:    02/06/25 2355 02/07/25 0317 02/07/25 0355 02/07/25 0656   BP: 121/82  120/71 127/82   Pulse: 93  75 89   Resp: 16  16 16   Temp: 98 °F (36.7 °C)  98.3 °F (36.8 °C) 98.2 °F (36.8 °C)   TempSrc:    Oral   SpO2: 92%  92% (!) 86%   Weight:  68.1 kg (150 lb 2.1 oz)     Height:             Physical Exam:      Physical Exam Performed:    /82   Pulse 89   Temp 98.2 °F (36.8 °C) (Oral)   Resp 16   Ht 1.702 m (5' 7\")   Wt 68.1 kg (150 lb 2.1 oz)   SpO2 (!) 86%   BMI 23.51 kg/m²     General appearance: No apparent distress.  Respiratory:  Normal respiratory effort. Clear to auscultation, bilaterally without Rales/Wheezes/Rhonchi.  Cardiovascular: Regular rate and rhythm with normal S1/S2 without murmurs, rubs or gallops.  Abdomen: Soft, non-tender  Musculoskeletal: No clubbing, cyanosis   Skin: Right ankle wound  Neurologic: No focal weakness  Psychiatric: Alert and oriented  Capillary Refill: Brisk, 3 seconds, normal   Peripheral Pulses: +2 palpable, equal bilaterally       Medications:   Medications:    vitamin C  500 mg Oral Daily    aspirin  81 mg Oral Daily    ferrous sulfate  325 mg Oral BID     sodium chloride flush  5-40 mL IntraVENous 2 times per day    enoxaparin  40 mg SubCUTAneous Daily    ceFAZolin  2,000 mg IntraVENous On Call to OR      Infusions:    sodium chloride       PRN Meds: sodium chloride flush, 5-40 mL, PRN  sodium chloride, , PRN  potassium chloride, 40 mEq, PRN   Or  potassium alternative oral replacement, 40 mEq, PRN   Or  potassium chloride, 10 mEq, PRN  magnesium sulfate, 2,000 mg, PRN  ondansetron, 4 mg, Q8H PRN   Or  ondansetron, 4 mg, Q6H PRN  polyethylene glycol, 17 g, Daily PRN  acetaminophen, 650 mg, Q6H PRN   Or  acetaminophen, 650 mg, Q6H PRN        Labs and Imaging   CT HIP LEFT WO CONTRAST    Result Date:

## 2025-02-07 NOTE — PROGRESS NOTES
Report called to Corbin MEDINA on 3W. All questions answered. Pt to room 3105 when transport available.

## 2025-02-07 NOTE — PROGRESS NOTES
Patient with intermittent confusion throughout the shift.     Call placed to patient's daughter Tiffany Robbins ([phone]) to obtain new surgical consent for open reduction and internal fixation of the left hip with Dr. Alex Lemons, consent for blood administration, and to confirm surgical code status. Consent obtained & verified by this RN and Charge RN Shena Prasad for surgery and blood administration. Tiffany reports that she would like to suspend patient's DNR-CC status during surgery and resume code status 24 hours after procedure per hospital policy. Signed documents placed into patients chart.

## 2025-02-07 NOTE — CARE COORDINATION
SW spoke to Gustavo in admissions at [facility]. They advised that they are full today, however, if a room comes available after the weekend he will be the first one to get it. They will require 3 midnights. Today is night one. They did NOT decline the request for rehab. SW updated the request from 'pending' to considering. DELMIS did advise that Yaneth from [facility]W was on this weekend and would likely follow up as more info comes available to her.    Patient is currently in surgery and we will speak with everyone after therapy has a chance to work with him.     Respectfully submitted,    Twila ROSEN, MAGDALENE  [facility]   [phone]    Electronically signed by SILAS Maldonado, ZINA on 2/7/2025 at 3:26 PM

## 2025-02-07 NOTE — ANESTHESIA PRE PROCEDURE
AM    MCV 97.2 02/07/2025 04:15 AM    RDW 17.9 02/07/2025 04:15 AM     02/07/2025 04:15 AM     CMP    Lab Results   Component Value Date/Time     02/07/2025 04:15 AM    K 4.1 02/07/2025 04:15 AM    K 4.4 02/06/2025 08:28 AM     02/07/2025 04:15 AM    CO2 25 02/07/2025 04:15 AM    BUN 27 02/07/2025 04:15 AM    CREATININE 1.0 02/07/2025 04:15 AM    GFRAA >60 10/03/2022 11:54 AM    GFRAA >60 01/31/2013 09:19 AM    AGRATIO 1.6 02/07/2025 04:15 AM    LABGLOM 70 02/07/2025 04:15 AM    GLUCOSE 118 02/07/2025 04:15 AM    GLUCOSE 97 01/03/2017 01:52 PM    CALCIUM 8.0 02/07/2025 04:15 AM    BILITOT 0.4 02/07/2025 04:15 AM    ALKPHOS 100 02/07/2025 04:15 AM    AST 31 02/07/2025 04:15 AM    ALT 16 02/07/2025 04:15 AM     BMP    Lab Results   Component Value Date/Time     02/07/2025 04:15 AM    K 4.1 02/07/2025 04:15 AM    K 4.4 02/06/2025 08:28 AM     02/07/2025 04:15 AM    CO2 25 02/07/2025 04:15 AM    BUN 27 02/07/2025 04:15 AM    CREATININE 1.0 02/07/2025 04:15 AM    CALCIUM 8.0 02/07/2025 04:15 AM    GFRAA >60 10/03/2022 11:54 AM    GFRAA >60 01/31/2013 09:19 AM    LABGLOM 70 02/07/2025 04:15 AM    GLUCOSE 118 02/07/2025 04:15 AM    GLUCOSE 97 01/03/2017 01:52 PM     POCGlucose  Recent Labs     02/06/25  0828 02/07/25  0415   GLUCOSE 86 118*      Coags    Lab Results   Component Value Date/Time    PROTIME 13.6 02/06/2025 08:28 AM    INR 1.02 02/06/2025 08:28 AM     HCG (If Applicable) No results found for: \"PREGTESTUR\", \"PREGSERUM\", \"HCG\", \"HCGQUANT\"   ABGs No results found for: \"PHART\", \"PO2ART\", \"WMM4VDV\", \"FNO2PML\", \"BEART\", \"L4WFUFGL\"   Type & Screen (If Applicable)  No results found for: \"LABABO\"                         BMI: Wt Readings from Last 3 Encounters:       NPO Status:   Date of last liquid consumption: 02/06/25   Time of last liquid consumption: 1159   Date of last solid food consumption: 02/06/25      Time of last solid consumption: 1159       Anesthesia Evaluation  Patient

## 2025-02-07 NOTE — CARE COORDINATION
Case Management Assessment  Initial Evaluation    Date/Time of Evaluation: 2/7/2025 12:25 PM  Assessment Completed by: SILAS Maldonado, MATEUSW    If patient is discharged prior to next notation, then this note serves as note for discharge by case management.    Patient Name: Ran Espinoza                   YOB: 1931  Diagnosis: Closed fracture of left hip, initial encounter ([facility]) [S72.002A]  Hip fracture requiring operative repair, left, sequela [S72.002S]                   Date / Time: 2/6/2025  7:20 AM    Patient Admission Status: Inpatient   Readmission Risk (Low < 19, Mod (19-27), High > 27): Readmission Risk Score: 15.2    Current PCP: Wiliam Mahmood MD  PCP verified by CM? Yes    Chart Reviewed: Yes      History Provided by: Child/Family  Patient Orientation: Alert and Oriented    Patient Cognition: Alert    Hospitalization in the last 30 days (Readmission):  No    If yes, Readmission Assessment in  Navigator will be completed.    Advance Directives:      Code Status: DNR-CC   Patient's Primary Decision Maker is: Legal Next of Kin    Primary Decision Maker: Bailee Espinoza - Spouse - [phone]    Secondary Decision Maker: ArmandoAlicia - Child - [phone]    Secondary Decision Maker: Tiffany Robbins - Child - [phone]    Discharge Planning:    Patient lives with: Alone Type of Home: House  Primary Care Giver: Self  Patient Support Systems include: Spouse/Significant Other, Children, Family Members, Friends/Neighbors   Current Financial resources: Medicare  Current community resources: None  Current services prior to admission: Durable Medical Equipment            Current DME: Walker            Type of Home Care services:  None    ADLS  Prior functional level: Independent in ADLs/IADLs  Current functional level: Assistance with the following: (TBD by therapies.)    PT AM-PAC:   /24  OT AM-PAC:   /24    Family can provide assistance at DC: No (daughters live out of town.)  Would you like  Case Management to discuss the discharge plan with any other family members/significant others, and if so, who? Yes (call daughters first.)  Plans to Return to Present Housing: No  Other Identified Issues/Barriers to RETURNING to current housing: Lives alone. Wife is in assisted living. Daughters live out of town.  Potential Assistance needed at discharge: Other (Comment), Skilled Nursing Facility (Acute Rehab.)            Potential DME:    Patient expects to discharge to: Unknown  Plan for transportation at discharge: Family    Financial    Payor: MEDICARE / Plan: MEDICARE PART A AND B / Product Type: *No Product type* /     Does insurance require precert for SNF: No    Potential assistance Purchasing Medications: No  Meds-to-Beds request: Yes      [id number] - [place], [place] - [address] - P [phone] - F [phone]  [address] [place] [address]  Phone: [phone] Fax: [phone]      Notes:    Factors facilitating achievement of predicted outcomes: Family support, Friend support, Motivated, Cooperative, Good insight into deficits, Has needed Durable Medical Equipment at home, and Knowledge about rehab    Barriers to discharge: Lives alone. Wife is in assisted living. Daughters live out of town.    Additional Case Management Notes:     1) Waiting on ortho clearance and therapy recommendations.     2) SNF vs Acute Rehab. Referral sent to [facility] so they can start to follow along in case they are needed. This would be the family's facility of preference.     The Plan for Transition of Care is related to the following treatment goals of Closed fracture of left hip, initial encounter ([facility]) [S72.002A]  Hip fracture requiring operative repair, left, sequela [S72.002S]    The Patient and/or Patient Representative Agree with the Discharge Plan?  Unsure, as the plan remains in process.       Respectfully submitted,    Twila Marin [facility], MAGDALENE  [facility]

## 2025-02-07 NOTE — PROGRESS NOTES
Patient to PACU from OR. Pt asleep. VS stable (see flowsheet) on 4L NC. Surgical dressing clean, dry and intact x1.

## 2025-02-07 NOTE — OP NOTE
prominences were well padded.  The operative extremity was prepped and draped in the usual sterile fashion. A safety timeout was performed where the correct patient, planned operative procedure, and correct operative site was reviewed and agreed upon by all Operating Room staff. It was also confirmed that the patient had received a dose of intravenous prophylactic antibiotics.     The planned incision was marked along the anterior aspect of the hip, slightly lateral and distal to the ASIS.  After the incision, the subcutaneous adipose tissue was dissected with electrocautery down to the level of the fascia.  A Rodas elevator was used to better visualize the fascial layer directly over the TFL.  A scalpel was used to incise the fascia in line with the overlying incision. The fascia was elevated medially identifying the interval between the TFL and rectus femoris. A ascending circumflex vessels were identified and carefully ligated with electrocautery. Next, the deep TFL fascia was incised exposing the capsule overlying the femoral neck. Retractors were placed and the capsule was opened in line with the femoral neck. The superior and inferior capsular leaflets were tagged with suture for later repair. The femoral neck was then cut and the femoral head was removed.  The fractured femoral head was exposed and removed with a corkscrew.  The acetabulum was cleared of any residual debris.  The femoral head was sized.      Attention was then addressed to the femur. The pirformis fossa was cleaned of any remaining soft tissue and a rongeur was used to remove the remnant bony at the posterior lateral corner of the femoral neck.  A box osteotome was then used to create a bony canal at the lateral aspect of the base of the femoral neck. The canal finder was placed down the femoral canal.  The femoral canal was then prepared with a series of sequential broaches paying close attention to maintaining consistent and appropriate

## 2025-02-07 NOTE — PROGRESS NOTES
Patient's daughter Tiffany has arrived and would like an update from surgeon after patient is finished in the OR. Tiffany sent to surgical waiting. Call placed to pre-op to alert surgery team. Information to be conveyed to surgeon by pre-op RN.

## 2025-02-07 NOTE — PROGRESS NOTES
Pt was AO x 4 upon initial assessment, he has episodes of intermittent confusion. Upon reassessment, pt is only alert and oriented to self and situation. He is able to tell his name and , he knows about the planned surgery but when asked if he knows where he is at this time, he answered \"[place]\", unable to tell time or date. Pt is able to make needs known and continent of bowel and bladder. Fall and standard safety precautions in place. Call light within reach.

## 2025-02-07 NOTE — CARE COORDINATION
Patient signed himself out of [facility] against medical advice. He did not want to wait for his daughter to pick him up. While there he refused to eat.     She stated that because he signed himself out and would not allow staff to assist him he is not welcomed back to [facility] this time.     She stated that she would welcome a future referral but at this point they can't take him.    Respectfully submitted,    Twila ROSEN, MAGDALENE  [facility]   [phone]    Electronically signed by SILAS Maldonado, ZINA on 2/7/2025 at 12:03 PM

## 2025-02-07 NOTE — PLAN OF CARE
Function:   Assess patient stability and activity tolerance for standing, transferring and ambulating with or without assistive devices   Assist with transfers and ambulation using safe patient handling equipment as needed   Ensure adequate protection for wounds/incisions during mobilization   Obtain physical therapy/occupational therapy consults as needed   Instruct patient/family in ordered activity level  Goal: Maintain proper alignment of affected body part  Outcome: Progressing  Flowsheets (Taken 2/6/2025 1751 by Shawnee Huynh, RN)  Maintain proper alignment of affected body part:   Support and protect limb and body alignment per provider's orders   Instruct and reinforce with patient and family use of appropriate assistive device and precautions (e.g. spinal or hip dislocation precautions)  Goal: Return ADL status to a safe level of function  Outcome: Progressing  Flowsheets (Taken 2/6/2025 1751 by Shawnee Huynh, RN)  Return ADL Status to a Safe Level of Function:   Administer medication as ordered   Obtain physical therapy/occupational therapy consults as needed   Assess activities of daily living deficits and provide assistive devices as needed   Assist and instruct patient to increase activity and self care as tolerated     Problem: Infection - Adult  Goal: Absence of infection at discharge  Outcome: Progressing  Flowsheets (Taken 2/6/2025 2323 by Davis Sierra, RN)  Absence of infection at discharge: Assess and monitor for signs and symptoms of infection

## 2025-02-08 LAB
ALBUMIN SERPL-MCNC: 3 G/DL (ref 3.4–5)
ALBUMIN/GLOB SERPL: 1.4 {RATIO} (ref 1.1–2.2)
ALP SERPL-CCNC: 93 U/L (ref 40–129)
ALT SERPL-CCNC: 16 U/L (ref 10–40)
ANION GAP SERPL CALCULATED.3IONS-SCNC: 8 MMOL/L (ref 3–16)
AST SERPL-CCNC: 36 U/L (ref 15–37)
BASOPHILS # BLD: 0 K/UL (ref 0–0.2)
BASOPHILS NFR BLD: 0.2 %
BILIRUB SERPL-MCNC: 0.4 MG/DL (ref 0–1)
BUN SERPL-MCNC: 27 MG/DL (ref 7–20)
CALCIUM SERPL-MCNC: 8.1 MG/DL (ref 8.3–10.6)
CHLORIDE SERPL-SCNC: 106 MMOL/L (ref 99–110)
CO2 SERPL-SCNC: 24 MMOL/L (ref 21–32)
CREAT SERPL-MCNC: 0.9 MG/DL (ref 0.8–1.3)
DEPRECATED RDW RBC AUTO: 17.8 % (ref 12.4–15.4)
EOSINOPHIL # BLD: 0 K/UL (ref 0–0.6)
EOSINOPHIL NFR BLD: 0 %
FOLATE SERPL-MCNC: 14.3 NG/ML (ref 4.78–24.2)
GFR SERPLBLD CREATININE-BSD FMLA CKD-EPI: 79 ML/MIN/{1.73_M2}
GLUCOSE SERPL-MCNC: 106 MG/DL (ref 70–99)
HCT VFR BLD AUTO: 25.5 % (ref 40.5–52.5)
HGB BLD-MCNC: 8.4 G/DL (ref 13.5–17.5)
LYMPHOCYTES # BLD: 0.1 K/UL (ref 1–5.1)
LYMPHOCYTES NFR BLD: 1.3 %
MCH RBC QN AUTO: 32.2 PG (ref 26–34)
MCHC RBC AUTO-ENTMCNC: 33 G/DL (ref 31–36)
MCV RBC AUTO: 97.8 FL (ref 80–100)
MONOCYTES # BLD: 0.5 K/UL (ref 0–1.3)
MONOCYTES NFR BLD: 5.9 %
NEUTROPHILS # BLD: 8.2 K/UL (ref 1.7–7.7)
NEUTROPHILS NFR BLD: 92.6 %
PLATELET # BLD AUTO: 175 K/UL (ref 135–450)
PMV BLD AUTO: 7.2 FL (ref 5–10.5)
POTASSIUM SERPL-SCNC: 4.2 MMOL/L (ref 3.5–5.1)
PROT SERPL-MCNC: 5.1 G/DL (ref 6.4–8.2)
RBC # BLD AUTO: 2.6 M/UL (ref 4.2–5.9)
SODIUM SERPL-SCNC: 138 MMOL/L (ref 136–145)
T4 FREE SERPL-MCNC: 1.1 NG/DL (ref 0.9–1.8)
TROPONIN, HIGH SENSITIVITY: 64 NG/L (ref 0–22)
TSH SERPL DL<=0.005 MIU/L-ACNC: 7.14 UIU/ML (ref 0.27–4.2)
VIT B12 SERPL-MCNC: 241 PG/ML (ref 211–911)
WBC # BLD AUTO: 8.8 K/UL (ref 4–11)

## 2025-02-08 PROCEDURE — 85025 COMPLETE CBC W/AUTO DIFF WBC: CPT

## 2025-02-08 PROCEDURE — 6370000000 HC RX 637 (ALT 250 FOR IP): Performed by: INTERNAL MEDICINE

## 2025-02-08 PROCEDURE — 2060000000 HC ICU INTERMEDIATE R&B

## 2025-02-08 PROCEDURE — 6360000002 HC RX W HCPCS: Performed by: INTERNAL MEDICINE

## 2025-02-08 PROCEDURE — 84439 ASSAY OF FREE THYROXINE: CPT

## 2025-02-08 PROCEDURE — 93005 ELECTROCARDIOGRAM TRACING: CPT

## 2025-02-08 PROCEDURE — 82607 VITAMIN B-12: CPT

## 2025-02-08 PROCEDURE — 6370000000 HC RX 637 (ALT 250 FOR IP): Performed by: ORTHOPAEDIC SURGERY

## 2025-02-08 PROCEDURE — 80053 COMPREHEN METABOLIC PANEL: CPT

## 2025-02-08 PROCEDURE — 99024 POSTOP FOLLOW-UP VISIT: CPT | Performed by: ORTHOPAEDIC SURGERY

## 2025-02-08 PROCEDURE — 84484 ASSAY OF TROPONIN QUANT: CPT

## 2025-02-08 PROCEDURE — 6360000002 HC RX W HCPCS: Performed by: NURSE PRACTITIONER

## 2025-02-08 PROCEDURE — 36415 COLL VENOUS BLD VENIPUNCTURE: CPT

## 2025-02-08 PROCEDURE — 82746 ASSAY OF FOLIC ACID SERUM: CPT

## 2025-02-08 PROCEDURE — 6360000002 HC RX W HCPCS: Performed by: ORTHOPAEDIC SURGERY

## 2025-02-08 PROCEDURE — 2500000003 HC RX 250 WO HCPCS: Performed by: ORTHOPAEDIC SURGERY

## 2025-02-08 PROCEDURE — 92610 EVALUATE SWALLOWING FUNCTION: CPT

## 2025-02-08 PROCEDURE — 84443 ASSAY THYROID STIM HORMONE: CPT

## 2025-02-08 RX ORDER — CYCLOBENZAPRINE HCL 10 MG
10 TABLET ORAL 3 TIMES DAILY PRN
Status: DISCONTINUED | OUTPATIENT
Start: 2025-02-08 | End: 2025-02-13 | Stop reason: HOSPADM

## 2025-02-08 RX ORDER — CYANOCOBALAMIN 1000 UG/ML
1000 INJECTION, SOLUTION INTRAMUSCULAR; SUBCUTANEOUS ONCE
Status: COMPLETED | OUTPATIENT
Start: 2025-02-08 | End: 2025-02-08

## 2025-02-08 RX ADMIN — ACETAMINOPHEN 1000 MG: 500 TABLET ORAL at 20:05

## 2025-02-08 RX ADMIN — WATER 2000 MG: 1 INJECTION INTRAMUSCULAR; INTRAVENOUS; SUBCUTANEOUS at 07:45

## 2025-02-08 RX ADMIN — FERROUS SULFATE TAB 325 MG (65 MG ELEMENTAL FE) 325 MG: 325 (65 FE) TAB at 09:46

## 2025-02-08 RX ADMIN — POLYETHYLENE GLYCOL 3350 17 G: 17 POWDER, FOR SOLUTION ORAL at 19:55

## 2025-02-08 RX ADMIN — ACETAMINOPHEN 1000 MG: 500 TABLET ORAL at 13:45

## 2025-02-08 RX ADMIN — ONDANSETRON 4 MG: 2 INJECTION, SOLUTION INTRAMUSCULAR; INTRAVENOUS at 18:25

## 2025-02-08 RX ADMIN — CYANOCOBALAMIN 1000 MCG: 1000 INJECTION, SOLUTION INTRAMUSCULAR; SUBCUTANEOUS at 16:56

## 2025-02-08 RX ADMIN — CYCLOBENZAPRINE 10 MG: 10 TABLET, FILM COATED ORAL at 22:38

## 2025-02-08 RX ADMIN — FERROUS SULFATE TAB 325 MG (65 MG ELEMENTAL FE) 325 MG: 325 (65 FE) TAB at 18:25

## 2025-02-08 RX ADMIN — OXYCODONE HYDROCHLORIDE AND ACETAMINOPHEN 500 MG: 500 TABLET ORAL at 09:47

## 2025-02-08 RX ADMIN — ASPIRIN 81 MG CHEWABLE TABLET 81 MG: 81 TABLET CHEWABLE at 09:46

## 2025-02-08 RX ADMIN — ENOXAPARIN SODIUM 40 MG: 100 INJECTION SUBCUTANEOUS at 09:47

## 2025-02-08 ASSESSMENT — PAIN DESCRIPTION - FREQUENCY: FREQUENCY: CONTINUOUS

## 2025-02-08 ASSESSMENT — PAIN DESCRIPTION - LOCATION
LOCATION: BACK
LOCATION: HIP

## 2025-02-08 ASSESSMENT — PAIN - FUNCTIONAL ASSESSMENT
PAIN_FUNCTIONAL_ASSESSMENT: PREVENTS OR INTERFERES SOME ACTIVE ACTIVITIES AND ADLS
PAIN_FUNCTIONAL_ASSESSMENT: PREVENTS OR INTERFERES SOME ACTIVE ACTIVITIES AND ADLS

## 2025-02-08 ASSESSMENT — PAIN SCALES - WONG BAKER: WONGBAKER_NUMERICALRESPONSE: NO HURT

## 2025-02-08 ASSESSMENT — PAIN DESCRIPTION - ORIENTATION
ORIENTATION: MID
ORIENTATION: LEFT

## 2025-02-08 ASSESSMENT — PAIN SCALES - GENERAL
PAINLEVEL_OUTOF10: 5
PAINLEVEL_OUTOF10: 4

## 2025-02-08 ASSESSMENT — PAIN DESCRIPTION - DESCRIPTORS
DESCRIPTORS: STABBING
DESCRIPTORS: ACHING

## 2025-02-08 ASSESSMENT — PAIN DESCRIPTION - ONSET: ONSET: GRADUAL

## 2025-02-08 ASSESSMENT — PAIN DESCRIPTION - PAIN TYPE: TYPE: ACUTE PAIN

## 2025-02-08 NOTE — PROGRESS NOTES
Hospitalist Progress Note  2/8/2025 10:07 AM  Subjective:   Admit Date: 2/6/2025  PCP: Wiliam Mahmood MD Status: Inpatient   Interval History: Hospital Day: 3, admitted with left hip fracture after mechanical fall s/p left hip hemiarthroplasty (2/7 Dr Lemons).  Intermittent agitation and confusion.  Tried to get out of bed overnight. Tachycardia and sinus arrythmia w/ rate of 120-140 overnight.   Difficulty swallowing noted by nursing with bedside swallowing evaluation, diet changed to dysphagia diet per SLP.     Diet: dysphagia  Left forearm peripheral IV (2/7, day #2)     Medications:     acetaminophen  1,000 mg Oral 3 times per day   vitamin C  500 mg Oral Daily   aspirin  81 mg Oral Daily   ferrous sulfate  325 mg Oral BID WC   enoxaparin  40 mg SubCUTAneous Daily       Labs:       02/06/25  0828 02/07/25  0415 02/08/25  0610   WBC 7.1 6.9 8.8   HGB 9.0* 8.4* 8.4*    183 175   .1* 97.2 97.8          141 138   K 4.4 4.1 4.2    109 106   CO2 24 25 24   BUN 28* 27* 27*   CREATININE 1.0 1.0 0.9   GLUCOSE 86 118* 106*         CALCIUM 8.7 8.0* 8.1*   ALBUMIN 3.4 3.1* 3.0*   INR 1.02  --   --    AST 30 31 36   ALT 10 16 16   ALKPHOS 124 100 93     TSH (2/8) 7.14 uIU/mL  Free T4 (2/8) 1.1 ng/dL    CT left hip (2/6) Subcapital left hip fracture     Noncontrast CT head (2/6) No acute intracranial abnormality.     CT cervical spine (2/6) No acute abnormality of the cervical spine     Transthoracic echo (2/7) Left Ventricle: Normal left ventricular systolic function with a visually estimated EF of 55 - 60%. Left ventricle size is normal. Mild basal septal thickening. Normal wall motion. Diastolic dysfunction present with increased LAP with normal LV EF. Right Ventricle: Right ventricle size is normal. Normal systolic function. Left Atrium: Left atrium is severely dilated. Aortic Valve: Trileaflet valve. Severely calcified cusps. Mild regurgitation. Severe stenosis of the aortic valve. AV mean gradient

## 2025-02-08 NOTE — PROGRESS NOTES
[facility] Orthopedic Surgery   Progress Note      SUBJECTIVE:  No complaints. Poor historian      OBJECTIVE:  /83   Pulse 85   Temp 98.2 °F (36.8 °C) (Axillary)   Resp 17   Ht 1.702 m (5' 7\")   Wt 67.3 kg (148 lb 5.9 oz)   SpO2 92%   BMI 23.24 kg/m²   In no apparent distress. Confused.   Dressing / incision clean, dry, intact. Dried bloody drainage  Sensation is intact to light touch throughout the Left foot.  Wiggles toes  The Left foot is warm and well perfused.    CBC:   Lab Results   Component Value Date/Time    WBC 8.8 02/08/2025 06:10 AM    RBC 2.60 02/08/2025 06:10 AM    RBC 4.01 01/03/2017 01:52 PM    HGB 8.4 02/08/2025 06:10 AM    HCT 25.5 02/08/2025 06:10 AM    MCV 97.8 02/08/2025 06:10 AM    MCH 32.2 02/08/2025 06:10 AM    MCHC 33.0 02/08/2025 06:10 AM    RDW 17.8 02/08/2025 06:10 AM     02/08/2025 06:10 AM    MPV 7.2 02/08/2025 06:10 AM       PT/INR:    Lab Results   Component Value Date/Time    PROTIME 13.6 02/06/2025 08:28 AM    INR 1.02 02/06/2025 08:28 AM       Chem Basic:   Lab Results   Component Value Date/Time     02/08/2025 06:10 AM    K 4.2 02/08/2025 06:10 AM    K 4.4 02/06/2025 08:28 AM     02/08/2025 06:10 AM    CO2 24 02/08/2025 06:10 AM    GLUCOSE 106 02/08/2025 06:10 AM    GLUCOSE 97 01/03/2017 01:52 PM    BUN 27 02/08/2025 06:10 AM    CREATININE 0.9 02/08/2025 06:10 AM           ASSESSMENT:  POD#1 s/p left hip hemiarthroplasty      PLAN:    --Pain control  --Finish prophylactic IV abx  --Acute blood loss anemia. Stable postop. Monitor H/h.  --DVT prophylaxis  --PT/OT  --WBAT  --Anterior hip precautions  --Dispo planning        Electronically signed by Enrrique Bustillos MD on 2/8/2025 at 9:54 AM

## 2025-02-08 NOTE — PLAN OF CARE
Problem: Discharge Planning  Goal: Discharge to home or other facility with appropriate resources  2/8/2025 0622 by Yoly Maldonado, RN  Outcome: Progressing     Problem: Pain  Goal: Verbalizes/displays adequate comfort level or baseline comfort level  2/8/2025 0622 by Yoly Maldonado, RN  Outcome: Progressing     Problem: Safety - Adult  Goal: Free from fall injury  2/8/2025 0622 by Yoly Maldonado, RN  Outcome: Progressing

## 2025-02-08 NOTE — ANESTHESIA POSTPROCEDURE EVALUATION
Department of Anesthesiology  Postprocedure Note    Patient: Ran Espinoza  MRN: [id number]  YOB: 1931  Date of evaluation: 2/7/2025    Procedure Summary       Date: 02/07/25 Room / Location: [address]    Anesthesia Start: 1509 Anesthesia Stop: 1701    Procedure: LEFT HIP HEMIARTHROPLASTY ANTERIOR APPROACH (Left: Hip) Diagnosis:       Closed fracture of left hip, initial encounter ([facility])      (Closed fracture of left hip, initial encounter ([facility]) [S72.002A])    Surgeons: Alex Lemons MD Responsible Provider: Ceasar Saldana MD    Anesthesia Type: general ASA Status: 4            Anesthesia Type: No value filed.    Darrius Phase I: Darrius Score: 9    Darrius Phase II:      Anesthesia Post Evaluation    Patient location during evaluation: PACU  Patient participation: complete - patient participated  Level of consciousness: awake and confused  Pain score: 2  Airway patency: patent  Nausea & Vomiting: no nausea and no vomiting  Cardiovascular status: blood pressure returned to baseline  Respiratory status: acceptable  Hydration status: euvolemic  Pain management: adequate    No notable events documented.

## 2025-02-08 NOTE — PROGRESS NOTES
Patient on telemetry running sinus arrythmia w/ a rate of 120-140's. Patient asymptomatic, denies pain / SOB / heart palpitations. Patient has been sinus arrythmia throughout the day w/ a controlled rate of 70's - 90's. Vitals obtained, noted to be stable w/ the exception of HR. Message sent to MD Maryse Gordon to alert to change in cardiac function.     Waiting for response

## 2025-02-08 NOTE — PROGRESS NOTES
[facility]: [facility] 3W ORTHOPEDICS  DYSPHAGIA BEDSIDE SWALLOW EVALUATION     Patient: Ran Espinoza   : 1931   MRN: [id number]      Evaluation Date: 2025     Admitting Diagnosis:   Closed fracture of left hip, initial encounter ([facility]) [S72.002A]  Hip fracture requiring operative repair, left, sequela [S72.002S]   has a past medical history of Anemia, Cancer ([facility]), GERD (gastroesophageal reflux disease), Heart murmur, and Wears glasses.   has a past surgical history that includes Ankle surgery (Right, ); Colonoscopy (2009); Endoscopy, colon, diagnostic (2009); Tonsillectomy and adenoidectomy; and Inguinal hernia repair (N/A, 10/13/2017).  Allergies:   Allergies   Allergen Reactions    Egg-Derived Products Other (See Comments)     headaches     Dysphagia History including instrumental assessment: None, patient and patient's daughter deny history  GI history: N/A  ENT history: N/A  Baseline/Prior Level of Function: Living Status: Lives alone; Baseline diet: Regular/thin    Onset Date: 2025        Reason for referral: SLP evaluation orders received due to pt/family reports of trouble swallowing                         CURRENT ENCOUNTER DIAGNOSTICS/COURSE OF ADMISSION     H&P: LEFT HIP HEMIARTHROPLASTY  25  89 y.o. male who presented to [facility] with hip pain, had a mechanical fall at home of 2 he was falling asleep patient presented to ED initially with left hip pain 7 out of 10 intensity worse with any ambulation, imaging positive for hip fracture denies fever chills chest pain or shortness of breath.     Current Consultations:  Ortho    Imaging:  CT HEAD 25  IMPRESSION:  No acute intracranial abnormality.    Current Diet Level (prior to evaluation): NPO due to difficulty swallowing breakfast  Respiratory Status: Room Air     SUBJECTIVE     General: Patient awake, alert, cooperative, no agitation     Comments regarding referral/diet/dysphagia:   Patient: Reports \"it's not going

## 2025-02-08 NOTE — PLAN OF CARE
Problem: Discharge Planning  Goal: Discharge to home or other facility with appropriate resources  Outcome: Progressing   Continuing to work with patient and health care team on discharge plan. Discharge instructions and medication management will be reviewed prior to discharge.    Problem: Pain  Goal: Verbalizes/displays adequate comfort level or baseline comfort level  Outcome: Progressing   Pt able to express presence/absence of pain and rate pain appropriately using numerical scale. Pain/discomfort being managed with PRN analgesics per MD orders (see MAR). Pain assessed every shift and after interventions.    Problem: Safety - Adult  Goal: Free from fall injury  Outcome: Progressing   Pt free from falls this shift. Fall precautions in place at all times. Call light always within reach. Pt able and agreeable to contact for safety appropriately.    Problem: ABCDS Injury Assessment  Goal: Absence of physical injury  Outcome: Progressing     Problem: Skin/Tissue Integrity  Goal: Skin integrity remains intact  Description: 1.  Monitor for areas of redness and/or skin breakdown  2.  Assess vascular access sites hourly  3.  Every 4-6 hours minimum:  Change oxygen saturation probe site  4.  Every 4-6 hours:  If on nasal continuous positive airway pressure, respiratory therapy assess nares and determine need for appliance change or resting period  Outcome: Progressing     Problem: Skin/Tissue Integrity  Goal: Skin integrity remains intact  Description: 1.  Monitor for areas of redness and/or skin breakdown  2.  Assess vascular access sites hourly  3.  Every 4-6 hours minimum:  Change oxygen saturation probe site  4.  Every 4-6 hours:  If on nasal continuous positive airway pressure, respiratory therapy assess nares and determine need for appliance change or resting period  Outcome: Progressing     Problem: Cardiovascular - Adult  Goal: Maintains optimal cardiac output and hemodynamic stability  Outcome:

## 2025-02-08 NOTE — FLOWSHEET NOTE
Pt slept little overnight. Very agitated, confused, sarcastic and belligerent with staff. Refused to keep tele on. Removed multiple times. Took boots off of feet. Tried to get oob several times and did manage to stand at one point. Pt refused to believe that he had hip surgery and stated that he was walking out of here. Multiple staff in pt's room off and on all night. Did not want staff in room and were told to get out many times. Very confused and forgetful. All fall precautions in place. Bed alarm on. Call light in reach.

## 2025-02-09 ENCOUNTER — APPOINTMENT (OUTPATIENT)
Dept: GENERAL RADIOLOGY | Age: 89
DRG: 521 | End: 2025-02-09
Payer: MEDICARE

## 2025-02-09 LAB
ALBUMIN SERPL-MCNC: 2.8 G/DL (ref 3.4–5)
ANION GAP SERPL CALCULATED.3IONS-SCNC: 5 MMOL/L (ref 3–16)
BASE EXCESS BLDV CALC-SCNC: 2.5 MMOL/L
BASOPHILS # BLD: 0 K/UL (ref 0–0.2)
BASOPHILS NFR BLD: 0.3 %
BUN SERPL-MCNC: 32 MG/DL (ref 7–20)
CALCIUM SERPL-MCNC: 8.2 MG/DL (ref 8.3–10.6)
CHLORIDE SERPL-SCNC: 107 MMOL/L (ref 99–110)
CO2 BLDV-SCNC: 27 MMOL/L
CO2 SERPL-SCNC: 28 MMOL/L (ref 21–32)
COHGB MFR BLDV: 1.9 %
CREAT SERPL-MCNC: 1 MG/DL (ref 0.8–1.3)
DEPRECATED RDW RBC AUTO: 18 % (ref 12.4–15.4)
EKG ATRIAL RATE: 87 BPM
EKG DIAGNOSIS: NORMAL
EKG P AXIS: 46 DEGREES
EKG P-R INTERVAL: 130 MS
EKG Q-T INTERVAL: 374 MS
EKG QRS DURATION: 94 MS
EKG QTC CALCULATION (BAZETT): 450 MS
EKG R AXIS: 53 DEGREES
EKG T AXIS: 11 DEGREES
EKG VENTRICULAR RATE: 87 BPM
EOSINOPHIL # BLD: 0 K/UL (ref 0–0.6)
EOSINOPHIL NFR BLD: 0.2 %
GFR SERPLBLD CREATININE-BSD FMLA CKD-EPI: 70 ML/MIN/{1.73_M2}
GLUCOSE SERPL-MCNC: 108 MG/DL (ref 70–99)
HCO3 BLDV-SCNC: 26 MMOL/L (ref 23–29)
HCT VFR BLD AUTO: 24.7 % (ref 40.5–52.5)
HGB BLD-MCNC: 8.2 G/DL (ref 13.5–17.5)
LACTATE BLDV-SCNC: 1 MMOL/L (ref 0.4–2)
LYMPHOCYTES # BLD: 0.2 K/UL (ref 1–5.1)
LYMPHOCYTES NFR BLD: 2.8 %
MAGNESIUM SERPL-MCNC: 2.08 MG/DL (ref 1.8–2.4)
MCH RBC QN AUTO: 32.3 PG (ref 26–34)
MCHC RBC AUTO-ENTMCNC: 33.1 G/DL (ref 31–36)
MCV RBC AUTO: 97.8 FL (ref 80–100)
METHGB MFR BLDV: 0.6 %
MONOCYTES # BLD: 0.5 K/UL (ref 0–1.3)
MONOCYTES NFR BLD: 7.9 %
NEUTROPHILS # BLD: 6.1 K/UL (ref 1.7–7.7)
NEUTROPHILS NFR BLD: 88.8 %
NT-PROBNP SERPL-MCNC: 4620 PG/ML (ref 0–449)
O2 THERAPY: ABNORMAL
PCO2 BLDV: 35.1 MMHG (ref 40–50)
PH BLDV: 7.48 [PH] (ref 7.35–7.45)
PHOSPHATE SERPL-MCNC: 3.1 MG/DL (ref 2.5–4.9)
PLATELET # BLD AUTO: 182 K/UL (ref 135–450)
PMV BLD AUTO: 7.4 FL (ref 5–10.5)
PO2 BLDV: 53 MMHG
POTASSIUM SERPL-SCNC: 4.3 MMOL/L (ref 3.5–5.1)
RBC # BLD AUTO: 2.53 M/UL (ref 4.2–5.9)
SAO2 % BLDV: 89 %
SODIUM SERPL-SCNC: 140 MMOL/L (ref 136–145)
TROPONIN, HIGH SENSITIVITY: 64 NG/L (ref 0–22)
WBC # BLD AUTO: 6.9 K/UL (ref 4–11)

## 2025-02-09 PROCEDURE — 71045 X-RAY EXAM CHEST 1 VIEW: CPT

## 2025-02-09 PROCEDURE — 99223 1ST HOSP IP/OBS HIGH 75: CPT | Performed by: INTERNAL MEDICINE

## 2025-02-09 PROCEDURE — 80069 RENAL FUNCTION PANEL: CPT

## 2025-02-09 PROCEDURE — 2700000000 HC OXYGEN THERAPY PER DAY

## 2025-02-09 PROCEDURE — 83880 ASSAY OF NATRIURETIC PEPTIDE: CPT

## 2025-02-09 PROCEDURE — 93010 ELECTROCARDIOGRAM REPORT: CPT | Performed by: INTERNAL MEDICINE

## 2025-02-09 PROCEDURE — 97162 PT EVAL MOD COMPLEX 30 MIN: CPT

## 2025-02-09 PROCEDURE — 94761 N-INVAS EAR/PLS OXIMETRY MLT: CPT

## 2025-02-09 PROCEDURE — 6370000000 HC RX 637 (ALT 250 FOR IP): Performed by: ORTHOPAEDIC SURGERY

## 2025-02-09 PROCEDURE — 36415 COLL VENOUS BLD VENIPUNCTURE: CPT

## 2025-02-09 PROCEDURE — 6360000002 HC RX W HCPCS: Performed by: INTERNAL MEDICINE

## 2025-02-09 PROCEDURE — 6360000002 HC RX W HCPCS: Performed by: NURSE PRACTITIONER

## 2025-02-09 PROCEDURE — 82803 BLOOD GASES ANY COMBINATION: CPT

## 2025-02-09 PROCEDURE — 2060000000 HC ICU INTERMEDIATE R&B

## 2025-02-09 PROCEDURE — 83605 ASSAY OF LACTIC ACID: CPT

## 2025-02-09 PROCEDURE — 84484 ASSAY OF TROPONIN QUANT: CPT

## 2025-02-09 PROCEDURE — 6370000000 HC RX 637 (ALT 250 FOR IP): Performed by: INTERNAL MEDICINE

## 2025-02-09 PROCEDURE — 85025 COMPLETE CBC W/AUTO DIFF WBC: CPT

## 2025-02-09 PROCEDURE — 83735 ASSAY OF MAGNESIUM: CPT

## 2025-02-09 PROCEDURE — 97530 THERAPEUTIC ACTIVITIES: CPT

## 2025-02-09 RX ORDER — FUROSEMIDE 10 MG/ML
20 INJECTION INTRAMUSCULAR; INTRAVENOUS ONCE
Status: COMPLETED | OUTPATIENT
Start: 2025-02-09 | End: 2025-02-09

## 2025-02-09 RX ORDER — FUROSEMIDE 10 MG/ML
40 INJECTION INTRAMUSCULAR; INTRAVENOUS ONCE
Status: COMPLETED | OUTPATIENT
Start: 2025-02-09 | End: 2025-02-09

## 2025-02-09 RX ADMIN — ACETAMINOPHEN 1000 MG: 500 TABLET ORAL at 04:45

## 2025-02-09 RX ADMIN — ASPIRIN 81 MG CHEWABLE TABLET 81 MG: 81 TABLET CHEWABLE at 10:02

## 2025-02-09 RX ADMIN — OXYCODONE HYDROCHLORIDE AND ACETAMINOPHEN 500 MG: 500 TABLET ORAL at 10:02

## 2025-02-09 RX ADMIN — ACETAMINOPHEN 1000 MG: 500 TABLET ORAL at 20:35

## 2025-02-09 RX ADMIN — FUROSEMIDE 20 MG: 10 INJECTION, SOLUTION INTRAMUSCULAR; INTRAVENOUS at 02:28

## 2025-02-09 RX ADMIN — FERROUS SULFATE TAB 325 MG (65 MG ELEMENTAL FE) 325 MG: 325 (65 FE) TAB at 10:02

## 2025-02-09 RX ADMIN — FUROSEMIDE 40 MG: 10 INJECTION, SOLUTION INTRAMUSCULAR; INTRAVENOUS at 14:09

## 2025-02-09 RX ADMIN — POLYETHYLENE GLYCOL 3350 17 G: 17 POWDER, FOR SOLUTION ORAL at 20:35

## 2025-02-09 RX ADMIN — FERROUS SULFATE TAB 325 MG (65 MG ELEMENTAL FE) 325 MG: 325 (65 FE) TAB at 18:28

## 2025-02-09 RX ADMIN — ACETAMINOPHEN 1000 MG: 500 TABLET ORAL at 14:09

## 2025-02-09 RX ADMIN — ENOXAPARIN SODIUM 40 MG: 100 INJECTION SUBCUTANEOUS at 10:02

## 2025-02-09 ASSESSMENT — PAIN - FUNCTIONAL ASSESSMENT: PAIN_FUNCTIONAL_ASSESSMENT: PREVENTS OR INTERFERES SOME ACTIVE ACTIVITIES AND ADLS

## 2025-02-09 ASSESSMENT — PAIN DESCRIPTION - LOCATION: LOCATION: HIP

## 2025-02-09 ASSESSMENT — PAIN DESCRIPTION - ORIENTATION: ORIENTATION: LEFT

## 2025-02-09 ASSESSMENT — PAIN DESCRIPTION - DESCRIPTORS: DESCRIPTORS: DISCOMFORT;ACHING

## 2025-02-09 NOTE — PROGRESS NOTES
Patient resting in bed. Patient anxious and impulsive at intervals . Alert and oriented x 2. Respirations regular and unlabored on room air.  Patient refusing telemetry and pneumatic boots.   Tele-camera in place.  Fall/safety precautions in place. Call light in reach.  Electronically signed by Mariam Sung RN on 2/8/2025 at 8:51 PM

## 2025-02-09 NOTE — PROGRESS NOTES
[facility] Orthopedic Surgery   Progress Note      SUBJECTIVE:  Provides no significant history. Just mumbles.      OBJECTIVE:  BP (!) 138/90   Pulse 75   Temp 97.6 °F (36.4 °C)   Resp 16   Ht 1.702 m (5' 7\")   Wt 65.3 kg (143 lb 15.4 oz)   SpO2 98%   BMI 22.55 kg/m²   Dressing / incision clean, dry, intact.   Sensation is intact to light touch throughout the Left foot.  Wiggles toes  The Left foot is warm and well perfused.    CBC:   Lab Results   Component Value Date/Time    WBC 6.9 02/09/2025 07:01 AM    RBC 2.53 02/09/2025 07:01 AM    RBC 4.01 01/03/2017 01:52 PM    HGB 8.2 02/09/2025 07:01 AM    HCT 24.7 02/09/2025 07:01 AM    MCV 97.8 02/09/2025 07:01 AM    MCH 32.3 02/09/2025 07:01 AM    MCHC 33.1 02/09/2025 07:01 AM    RDW 18.0 02/09/2025 07:01 AM     02/09/2025 07:01 AM    MPV 7.4 02/09/2025 07:01 AM       PT/INR:    Lab Results   Component Value Date/Time    PROTIME 13.6 02/06/2025 08:28 AM    INR 1.02 02/06/2025 08:28 AM       Chem Basic:   Lab Results   Component Value Date/Time     02/09/2025 07:01 AM    K 4.3 02/09/2025 07:01 AM    K 4.4 02/06/2025 08:28 AM     02/09/2025 07:01 AM    CO2 28 02/09/2025 07:01 AM    GLUCOSE 108 02/09/2025 07:01 AM    GLUCOSE 97 01/03/2017 01:52 PM    BUN 32 02/09/2025 07:01 AM    CREATININE 1.0 02/09/2025 07:01 AM           ASSESSMENT:  POD#2 s/p left hip hemiarthroplasty      PLAN:    --Pain control. Judicious use of narcotics given mental status  --DVT prophylaxis  --Acute postop blood loss anemia. H/H stable. Continue to monitor.  --PT/OT  --Dispo planning. Follow  with Dr. Lemons in 2 weeks. [phone]          Electronically signed by Enrrique Bustillos MD on 2/9/2025 at 9:10 AM

## 2025-02-09 NOTE — PROGRESS NOTES
Hospitalist Progress Note  2/9/2025 10:09 AM  Subjective:   Admit Date: 2/6/2025  PCP: Wiliam Mahmood MD Status: Inpatient   Interval History: Hospital Day: 4, increased oxygen requirement to 4 LPM with pulmonary edema on CXR.     History of present illness: Admitted with left hip fracture after mechanical fall s/p left hip hemiarthroplasty (2/7 Dr Lemons).  Intermittent agitation and confusion.  Tried to get out of bed overnight. Tachycardia and sinus arrythmia w/ rate of 120-140 overnight.   Difficulty swallowing noted by nursing with bedside swallowing evaluation, diet changed to dysphagia diet per SLP.      Diet: dysphagia (nectar)  Left forearm peripheral IV (2/7, day #3)     Medications:     acetaminophen  1,000 mg Oral 3 times per day   vitamin C  500 mg Oral Daily   aspirin  81 mg Oral Daily   ferrous sulfate  325 mg Oral BID WC   enoxaparin  40 mg SubCUTAneous Daily       Labs:       02/07/25  0415 02/08/25  0610 02/09/25  0701   WBC 6.9 8.8 6.9   HGB 8.4* 8.4* 8.2*    175 182   MCV 97.2 97.8 97.8          138 140   K 4.1 4.2 4.3    106 107   CO2 25 24 28   BUN 27* 27* 32*   CREATININE 1.0 0.9 1.0   GLUCOSE 118* 106* 108*         MG  --   --  2.08   PHOS  --   --  3.1   CALCIUM 8.0* 8.1* 8.2*   ALBUMIN 3.1* 3.0* 2.8*   AST 31 36  --    ALT 16 16  --    ALKPHOS 100 93  --      VBG (2/9) 7.48 / 35  hsTnT (2/8) 64, (2/9) 64 ng/L  TSH (2/8) 7.14 uIU/mL  Free T4 (2/8) 1.1 ng/dL  proBNP (2/9) 4,620 pg/mL  Lactate (2/9) 1.0 mmol/L    Portable CXR (2/9) Pulmonary edema with basilar opacities and pleural effusions.      CT left hip (2/6) Subcapital left hip fracture      Noncontrast CT head (2/6) No acute intracranial abnormality.      CT cervical spine (2/6) No acute abnormality of the cervical spine      Transthoracic echo (2/7) Left Ventricle: Normal left ventricular systolic function with a visually estimated EF of 55 - 60%. Left ventricle size is normal. Mild basal septal thickening. Normal wall

## 2025-02-09 NOTE — PROGRESS NOTES
MD aKt messaged via [employer] r/t patient restlessness and discomfort.    During correspondence between this RN and MD, patient stated he was having worsening 9/10 back pain that felt like being stabbed.    STAT EKG and trops ordered and completed.    Flexeril ordered and administered.

## 2025-02-09 NOTE — CARE COORDINATION
Spoke with [facility]; they will review for clinical acceptance Mon am however may not have a bed until mid-week.   Discussed with daughter and reviewed SNF list; she  is interested in private room at [facility] and [facility] as plan B.      Referred via [facility]; patient accepted @ [facility]; not sure until am if private room available. Still waiting to hear back from [facility].   Updated daughter Tiffany.

## 2025-02-09 NOTE — CONSULTS
[facility]  Cardiology Consult Note        CC:      Pulmonary edema with hypoxia in the setting of severe aortic stenosis            HPI:   This is a 89 y.o. male admitted with hip fracture after mechanical fall.  Status post hemiarthroplasty.      The patient has had intermittent agitation and confusion.  He is having sinus tachycardia with heart rate in the 120-140.  Hemoglobin 8.2.  Chest x-ray showing pulmonary edema.  The patient treated with IV Lasix.  Vital signs stable.  Blood pressure 123/73 and a pulse of 73 oxygen saturations 98% on 4 L.      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    GERD (gastroesophageal reflux disease)     mild    Heart murmur     Wears glasses     reading      Past Surgical History:   Procedure Laterality Date    ANKLE SURGERY Right 1985    COLONOSCOPY  6/4/2009    ok, dr acosta    ENDOSCOPY, COLON, DIAGNOSTIC  06/04/2009    hiatal hernia dr acosta    INGUINAL HERNIA REPAIR N/A 10/13/2017    TONSILLECTOMY AND ADENOIDECTOMY        Family History   Problem Relation Age of Onset    Arthritis Mother     Heart Disease Father         pacemaker      Social History     Tobacco Use    Smoking status: Never    Smokeless tobacco: Never   Vaping Use    Vaping status: Never Used   Substance Use Topics    Alcohol use: Yes     Comment: minimal beer. Pt active plays basketball once a week    Drug use: No      Allergies   Allergen Reactions    Egg-Derived Products Other (See Comments)     headaches         Review of Systems -   Constitutional: Negative for weight gain/loss; malaise, fever  Respiratory: Negative for Asthma;  cough and hemoptysis  Cardiovascular: Negative for palpitations,dizziness   Gastrointestinal: Negative for abd.pain; constipation/diarrhea;    Genitourinary: Negative for stones; hematuria; frequency hesitancy  Integumentt: Negative for rash or pruritis  Hematologic/lymphatic:

## 2025-02-09 NOTE — PROGRESS NOTES
Zofran 4 mg given IVP for complaints of nausea. Patient repositioned for comfort. Respirations regular and unlabored on room air. Patient now agreeable to telemetry. Fall/safety precautions in place. Call light in reach. Electronically signed by Mariam Sung RN on 2/8/2025 at 8:48 PM

## 2025-02-09 NOTE — PROGRESS NOTES
Patient alert to person and situation .Respirations regular and unlabored on @ 4 L per nasal cannula. Telemetry showing sinus arrhythmia. Left hip drg intact with a small amount of dried blood present. Patient repositioned for comfort. Fall/safety precautions in place. Call light in reach. Electronically signed by Mariam Sung RN on 2/9/2025 at 5:30 PM

## 2025-02-09 NOTE — PROGRESS NOTES
Physical Therapy  Facility/Department: [address] ORTHOPEDICS  Physical Therapy Initial Assessment    Name: Ran Espinoza  : 1931  MRN: [id number]  Date of Service: 2025    Discharge Recommendations:  Continue to assess pending progress, Subacute/Skilled Nursing Facility   PT Equipment Recommendations  Other: Defer to next level of care.      Ran Espinoza scored a  on the AM-PAC short mobility form. Current research shows that an AM-PAC score of 17 or less is typically not associated with a discharge to the patient's home setting. Based on the patient's AM-PAC score and their current functional mobility deficits, it is recommended that the patient have 3-5 sessions per week of Physical Therapy at d/c to increase the patient's independence.  Please see assessment section for further patient specific details.    If patient discharges prior to next session this note will serve as a discharge summary.  Please see below for the latest assessment towards goals.      Assessment  Body Structures, Functions, Activity Limitations Requiring Skilled Therapeutic Intervention: Decreased functional mobility ;Decreased strength;Decreased safe awareness;Decreased endurance;Decreased balance;Increased pain  Assessment: 89 y/o male admit 2025 with L Hip Fx following fall. CT Head/C-Spine : negative. 2025 S/P L Hip Hemiarthroplasty (Dr Lemons; [facility]). PMH as noted including Mitral Regur, Lymphoma, Hernia/Repair, R Ankle Surg.  PTA pt living alone (wife at [facility]) in multi level home with ramp access and 1st floor bed/bath; reports independent daily care and functional mobility (with Rollator).  Pr currently requiring Max/depend assist x 2 for bed mob with care and repositioning.  At this time, would recommend cont PT (3-5) upon d/c.  Will cont to monitor pt's progress.  Therapy Prognosis: Fair  Decision Making: Medium Complexity  History: 89 y/o male admit 2025 with L Hip  Fx following fall. CT Head/C-Spine : negative. 2/7/2025 S/P L Hip Hemiarthroplasty (Dr Lemons; [facility]). PMH as noted including Mitral Regur, Lymphoma, Hernia/Repair, R Ankle Surg.  Exam: See above.  Clinical Presentation: See above.  Requires PT Follow-Up: Yes  Activity Tolerance  Activity Tolerance: Treatment limited secondary to decreased cognition;Patient limited by endurance;Patient limited by pain    Plan  Physical Therapy Plan  General Plan: 3-5 times per week  Current Treatment Recommendations: Strengthening, Therapeutic activities, Balance training, Functional mobility training, Transfer training, Gait training, Safety education & training, Patient/Caregiver education & training  Safety Devices  Type of Devices: Bed alarm in place, Call light within reach, Left in bed, Telesitter in use, Nurse notified    Restrictions  Restrictions/Precautions  Restrictions/Precautions: Fall Risk, Weight Bearing  Lower Extremity Weight Bearing Restrictions  Left Lower Extremity Weight Bearing: Weight Bearing As Tolerated  Position Activity Restriction  Hip Precautions: Anterior hip precautions     Subjective  General  Chart Reviewed: Yes  Patient assessed for rehabilitation services?: Yes  Additional Pertinent Hx: 89 y/o male admit 2/6/2025 with L Hip Fx following fall.  CT Head/C-Spine : negative.  2/7/2025 S/P L Hip Hemiarthroplasty (Dr Lemons; [facility]).  PMH as noted including Mitral Regur, Lymphoma, Hernia/Repair, R Ankle Surg.  Family/Caregiver Present: No  Referring Practitioner: Dr. Healy.  Other (Comment): Pt follows simple commands; delayed.  Subjective  Subjective: Pt agreeable to PT Eval/Rx. pain L hip with mvt (did not rate).         Social/Functional History  Social/Functional History  Lives With: Alone (Wife is at [facility].)  Type of Home: House  Home Layout: Multi-level  Home Access: Ramped entrance  Bathroom Shower/Tub: Walk-in shower  Bathroom Toilet: Handicap

## 2025-02-09 NOTE — PLAN OF CARE
Problem: Discharge Planning  Goal: Discharge to home or other facility with appropriate resources  2/8/2025 2025 by Liliane Mcgraw RN  Outcome: Progressing  Flowsheets (Taken 2/8/2025 1855 by Mariam Sung RN)  Discharge to home or other facility with appropriate resources:   Identify barriers to discharge with patient and caregiver   Identify discharge learning needs (meds, wound care, etc)   Refer to discharge planning if patient needs post-hospital services based on physician order or complex needs related to functional status, cognitive ability or social support system   Arrange for needed discharge resources and transportation as appropriate  2/8/2025 1855 by Mariam Sung RN  Flowsheets (Taken 2/8/2025 1855)  Discharge to home or other facility with appropriate resources:   Identify barriers to discharge with patient and caregiver   Identify discharge learning needs (meds, wound care, etc)   Refer to discharge planning if patient needs post-hospital services based on physician order or complex needs related to functional status, cognitive ability or social support system   Arrange for needed discharge resources and transportation as appropriate     Problem: Pain  Goal: Verbalizes/displays adequate comfort level or baseline comfort level  2/8/2025 2025 by Liliane Mcgraw RN  Outcome: Progressing  Flowsheets (Taken 2/8/2025 1855 by Mariam Sung RN)  Verbalizes/displays adequate comfort level or baseline comfort level:   Encourage patient to monitor pain and request assistance   Administer analgesics based on type and severity of pain and evaluate response   Assess pain using appropriate pain scale   Implement non-pharmacological measures as appropriate and evaluate response   Notify Licensed Independent Practitioner if interventions unsuccessful or patient reports new pain  2/8/2025 1855 by Mariam Sung RN  Flowsheets (Taken 2/8/2025 1855)  Verbalizes/displays adequate comfort level or baseline comfort level:    rate  Goal: Absence of cardiac dysrhythmias or at baseline  Outcome: Progressing  Flowsheets (Taken 2/7/2025 2244 by Yoly Maldonado, RN)  Absence of cardiac dysrhythmias or at baseline: Monitor cardiac rate and rhythm     Problem: Skin/Tissue Integrity - Adult  Goal: Skin integrity remains intact  Description: 1.  Monitor for areas of redness and/or skin breakdown  2.  Assess vascular access sites hourly  3.  Every 4-6 hours minimum:  Change oxygen saturation probe site  4.  Every 4-6 hours:  If on nasal continuous positive airway pressure, respiratory therapy assess nares and determine need for appliance change or resting period  Outcome: Progressing  Flowsheets (Taken 2/8/2025 0633 by Yoly Maldonado, RN)  Skin Integrity Remains Intact: Monitor for areas of redness and/or skin breakdown  Goal: Incisions, wounds, or drain sites healing without S/S of infection  Outcome: Progressing  Flowsheets (Taken 2/8/2025 0633 by Yoly Maldonado, RN)  Incisions, Wounds, or Drain Sites Healing Without Sign and Symptoms of Infection: TWICE DAILY: Assess and document skin integrity     Problem: Musculoskeletal - Adult  Goal: Return mobility to safest level of function  Outcome: Progressing  Flowsheets (Taken 2/7/2025 2244 by Yoly Maldonado, RN)  Return Mobility to Safest Level of Function: Assess patient stability and activity tolerance for standing, transferring and ambulating with or without assistive devices  Goal: Maintain proper alignment of affected body part  Outcome: Progressing  Flowsheets (Taken 2/6/2025 1751 by Shawnee Huynh, RN)  Maintain proper alignment of affected body part:   Support and protect limb and body alignment per provider's orders   Instruct and reinforce with patient and family use of appropriate assistive device and precautions (e.g. spinal or hip dislocation precautions)  Goal: Return ADL status to a safe level of function  Outcome: Progressing  Flowsheets (Taken 2/6/2025 1751 by Shawnee Huynh

## 2025-02-09 NOTE — PROGRESS NOTES
MD Kat messaged via [employer] regarding change in patient status. Patient now requiring 4L NC to stay above 90% O2 saturation. Pt is now lethargic, agitated, unable to follow commands, and less oriented than beginning of shift.     Trops, lactic  and VBGs ordered, MD coming to bedside.    -MD saw pt at bedside. Additional BNP and chest XR ordered.

## 2025-02-09 NOTE — PLAN OF CARE
Problem: Discharge Planning  Goal: Discharge to home or other facility with appropriate resources     Problem: Pain  Goal: Verbalizes/displays adequate comfort level or baseline comfort level     Problem: Safety - Adult  Goal: Free from fall injury     Problem: ABCDS Injury Assessment  Goal: Absence of physical injury     Problem: Cardiovascular - Adult  Goal: Maintains optimal cardiac output and hemodynamic stability

## 2025-02-10 LAB
ALBUMIN SERPL-MCNC: 2.8 G/DL (ref 3.4–5)
ANION GAP SERPL CALCULATED.3IONS-SCNC: 7 MMOL/L (ref 3–16)
BASOPHILS # BLD: 0 K/UL (ref 0–0.2)
BASOPHILS NFR BLD: 0.4 %
BUN SERPL-MCNC: 31 MG/DL (ref 7–20)
CALCIUM SERPL-MCNC: 8.2 MG/DL (ref 8.3–10.6)
CHLORIDE SERPL-SCNC: 108 MMOL/L (ref 99–110)
CO2 SERPL-SCNC: 30 MMOL/L (ref 21–32)
CREAT SERPL-MCNC: 1 MG/DL (ref 0.8–1.3)
DEPRECATED RDW RBC AUTO: 17.9 % (ref 12.4–15.4)
EOSINOPHIL # BLD: 0.1 K/UL (ref 0–0.6)
EOSINOPHIL NFR BLD: 2.2 %
GFR SERPLBLD CREATININE-BSD FMLA CKD-EPI: 70 ML/MIN/{1.73_M2}
GLUCOSE SERPL-MCNC: 90 MG/DL (ref 70–99)
HCT VFR BLD AUTO: 22.7 % (ref 40.5–52.5)
HGB BLD-MCNC: 7.5 G/DL (ref 13.5–17.5)
LYMPHOCYTES # BLD: 0.2 K/UL (ref 1–5.1)
LYMPHOCYTES NFR BLD: 3.8 %
MAGNESIUM SERPL-MCNC: 2.1 MG/DL (ref 1.8–2.4)
MCH RBC QN AUTO: 32.1 PG (ref 26–34)
MCHC RBC AUTO-ENTMCNC: 33.1 G/DL (ref 31–36)
MCV RBC AUTO: 96.9 FL (ref 80–100)
MONOCYTES # BLD: 0.3 K/UL (ref 0–1.3)
MONOCYTES NFR BLD: 6.7 %
NEUTROPHILS # BLD: 4.4 K/UL (ref 1.7–7.7)
NEUTROPHILS NFR BLD: 86.9 %
PHOSPHATE SERPL-MCNC: 3.4 MG/DL (ref 2.5–4.9)
PLATELET # BLD AUTO: 182 K/UL (ref 135–450)
PMV BLD AUTO: 7.1 FL (ref 5–10.5)
POTASSIUM SERPL-SCNC: 4 MMOL/L (ref 3.5–5.1)
RBC # BLD AUTO: 2.35 M/UL (ref 4.2–5.9)
SODIUM SERPL-SCNC: 145 MMOL/L (ref 136–145)
WBC # BLD AUTO: 5.1 K/UL (ref 4–11)

## 2025-02-10 PROCEDURE — 97166 OT EVAL MOD COMPLEX 45 MIN: CPT

## 2025-02-10 PROCEDURE — 80069 RENAL FUNCTION PANEL: CPT

## 2025-02-10 PROCEDURE — 83735 ASSAY OF MAGNESIUM: CPT

## 2025-02-10 PROCEDURE — 92526 ORAL FUNCTION THERAPY: CPT

## 2025-02-10 PROCEDURE — 6370000000 HC RX 637 (ALT 250 FOR IP): Performed by: ORTHOPAEDIC SURGERY

## 2025-02-10 PROCEDURE — 2700000000 HC OXYGEN THERAPY PER DAY

## 2025-02-10 PROCEDURE — 2060000000 HC ICU INTERMEDIATE R&B

## 2025-02-10 PROCEDURE — 6370000000 HC RX 637 (ALT 250 FOR IP): Performed by: INTERNAL MEDICINE

## 2025-02-10 PROCEDURE — 36415 COLL VENOUS BLD VENIPUNCTURE: CPT

## 2025-02-10 PROCEDURE — 97110 THERAPEUTIC EXERCISES: CPT

## 2025-02-10 PROCEDURE — 94761 N-INVAS EAR/PLS OXIMETRY MLT: CPT

## 2025-02-10 PROCEDURE — 85025 COMPLETE CBC W/AUTO DIFF WBC: CPT

## 2025-02-10 PROCEDURE — 97530 THERAPEUTIC ACTIVITIES: CPT

## 2025-02-10 PROCEDURE — 6360000002 HC RX W HCPCS: Performed by: NURSE PRACTITIONER

## 2025-02-10 RX ADMIN — ACETAMINOPHEN 1000 MG: 500 TABLET ORAL at 05:14

## 2025-02-10 RX ADMIN — ENOXAPARIN SODIUM 40 MG: 100 INJECTION SUBCUTANEOUS at 09:52

## 2025-02-10 RX ADMIN — ACETAMINOPHEN 1000 MG: 500 TABLET ORAL at 14:10

## 2025-02-10 RX ADMIN — ACETAMINOPHEN 1000 MG: 500 TABLET ORAL at 21:53

## 2025-02-10 RX ADMIN — FERROUS SULFATE TAB 325 MG (65 MG ELEMENTAL FE) 325 MG: 325 (65 FE) TAB at 09:51

## 2025-02-10 RX ADMIN — ASPIRIN 81 MG CHEWABLE TABLET 81 MG: 81 TABLET CHEWABLE at 09:52

## 2025-02-10 RX ADMIN — OXYCODONE HYDROCHLORIDE AND ACETAMINOPHEN 500 MG: 500 TABLET ORAL at 09:52

## 2025-02-10 RX ADMIN — FERROUS SULFATE TAB 325 MG (65 MG ELEMENTAL FE) 325 MG: 325 (65 FE) TAB at 17:15

## 2025-02-10 ASSESSMENT — PAIN SCALES - GENERAL
PAINLEVEL_OUTOF10: 8
PAINLEVEL_OUTOF10: 0
PAINLEVEL_OUTOF10: 7

## 2025-02-10 ASSESSMENT — PAIN DESCRIPTION - LOCATION
LOCATION: BACK
LOCATION: BACK

## 2025-02-10 ASSESSMENT — PAIN DESCRIPTION - PAIN TYPE
TYPE: ACUTE PAIN
TYPE: ACUTE PAIN

## 2025-02-10 ASSESSMENT — PAIN DESCRIPTION - DESCRIPTORS
DESCRIPTORS: ACHING
DESCRIPTORS: ACHING

## 2025-02-10 ASSESSMENT — PAIN DESCRIPTION - ORIENTATION
ORIENTATION: MID
ORIENTATION: MID

## 2025-02-10 ASSESSMENT — PAIN DESCRIPTION - FREQUENCY
FREQUENCY: CONTINUOUS
FREQUENCY: CONTINUOUS

## 2025-02-10 ASSESSMENT — PAIN SCALES - WONG BAKER: WONGBAKER_NUMERICALRESPONSE: NO HURT

## 2025-02-10 NOTE — PLAN OF CARE
Problem: Discharge Planning  Goal: Discharge to home or other facility with appropriate resources  2/9/2025 2002 by Liliane Mcgraw RN  Outcome: Progressing  Flowsheets (Taken 2/9/2025 1545 by Mariam Sung RN)  Discharge to home or other facility with appropriate resources:   Identify barriers to discharge with patient and caregiver   Identify discharge learning needs (meds, wound care, etc)   Refer to discharge planning if patient needs post-hospital services based on physician order or complex needs related to functional status, cognitive ability or social support system   Arrange for needed discharge resources and transportation as appropriate  2/9/2025 1545 by Mariam Sung RN  Flowsheets (Taken 2/9/2025 1545)  Discharge to home or other facility with appropriate resources:   Identify barriers to discharge with patient and caregiver   Identify discharge learning needs (meds, wound care, etc)   Refer to discharge planning if patient needs post-hospital services based on physician order or complex needs related to functional status, cognitive ability or social support system   Arrange for needed discharge resources and transportation as appropriate     Problem: Pain  Goal: Verbalizes/displays adequate comfort level or baseline comfort level  2/9/2025 2002 by Liliane Mcgraw RN  Outcome: Progressing  Flowsheets (Taken 2/9/2025 1545 by Mariam Sung RN)  Verbalizes/displays adequate comfort level or baseline comfort level:   Encourage patient to monitor pain and request assistance   Administer analgesics based on type and severity of pain and evaluate response   Consider cultural and social influences on pain and pain management   Assess pain using appropriate pain scale   Implement non-pharmacological measures as appropriate and evaluate response   Notify Licensed Independent Practitioner if interventions unsuccessful or patient reports new pain  2/9/2025 1545 by Mariam Sung RN  Flowsheets (Taken 2/9/2025

## 2025-02-10 NOTE — PROGRESS NOTES
Patient repositioned for comfort. Ximena care and skin care done. Inter-dry applied to abdominal folds.  Respirations regular and unlabored on O2 @ 4L per nasal cannula. Telemetry showing sinus arrhythmia . Fall/safety precautions in place.  Call light in reach. Electronically signed by Mariam Sung RN on 2/9/2025 at 9:40 PM

## 2025-02-10 NOTE — PROGRESS NOTES
Physical Therapy    Facility/Department: [address] ORTHOPEDICS    Physical Therapy Treatment note       Name: Ran Espinoza    : 1931    MRN: [id number]    Date of Service: 2/10/2025    Assessment / Discharge Recommendations:    -left hip fracture >>> hemiarthroplasty   wbat  anterior precautions     -able to mobilize from bed via alex stedy   -anticipate able to attempt up to walker at next session   -will need continued PTOT and nursing care in a skilled setting   -anticipate able to tolerate and benefit from high frequency of sessions if he wants vs quieter pace in SNF       Ran Espinoza scored a  on the AM-PAC short mobility form.     Current research shows that an AM-PAC score of 17 or less is typically not associated with a discharge to the patient's home setting.           Patient Diagnosis(es): The primary encounter diagnosis was Closed fracture of left hip, initial encounter (HCC). A diagnosis of Nonrheumatic aortic valve stenosis was also pertinent to this visit.  Past Medical History:  has a past medical history of Anemia, Cancer (HCC), GERD (gastroesophageal reflux disease), Heart murmur, and Wears glasses.  Past Surgical History:  has a past surgical history that includes Ankle surgery (Right, ); Colonoscopy (2009); Endoscopy, colon, diagnostic (2009); Tonsillectomy and adenoidectomy; Inguinal hernia repair (N/A, 10/13/2017); and hip surgery (Left, 2025).      Body Structures, Functions, Activity Limitations Requiring Skilled Therapeutic Intervention: Decreased functional mobility ;Decreased strength;Decreased safe awareness;Decreased endurance;Decreased balance;Increased pain;Decreased ADL status  Requires PT Follow-Up: Yes  Activity Tolerance  Activity Tolerance: Patient tolerated treatment well    Plan  Physical Therapy Plan  General Plan: 3-5 times per week  Current Treatment Recommendations: Strengthening, Therapeutic activities, Functional mobility training,  back to sitting on the side of a flat bed without using bedrails?: A Lot  How much help is needed moving to and from a bed to a chair?: A Lot  How much help is needed standing up from a chair using your arms?: A Lot  How much help is needed walking in hospital room?: Total  How much help is needed climbing 3-5 steps with a railing?: Total  AM-PAC Inpatient Mobility Raw Score : 11  AM-PAC Inpatient T-Scale Score : 33.86  Mobility Inpatient CMS 0-100% Score: 72.57  Mobility Inpatient CMS G-Code Modifier : CL        Goals  Short Term Goals  Time Frame for Short Term Goals: 1-2 days  Short Term Goal 1: Bed Mob Mod assist x 2.  Short Term Goal 2: Transfers via Stedy Min/Mod assist x 2.  Short Term Goal 3: Atttempt Transfers/Amb with assist device as approp.  Short Term Goal 4: Pt participating in approp Strength Exs.  Patient Goals   Patient Goals : want to get well to go home again       Education  Patient Education  Education Given To: Patient  Education Provided: Transfer Training  Education Provided Comments: weight bearing allowance and positional precautions (will need reminders)  Education Method: Demonstration;Verbal  Education Outcome: Continued education needed      Therapy Time   Individual Concurrent Group Co-treatment   Time In 1435         Time Out 1530         Minutes 55                 ASHTYN RASMUSSEN, PT

## 2025-02-10 NOTE — PROGRESS NOTES
Patient is in bed, quiet, with eyes closed. Respirations are even and unlabored. Bed is locked in lowest position with bed alarm on. Bedside table and call light are within reach. Tele cam remains in the room. Patient is able to communicate needs.

## 2025-02-10 NOTE — DISCHARGE INSTR - COC
[facility] Continuity of Care Form    Patient Name:  Ran Espinoza  : 1931    MRN:  [id number]    Admit date:  2025  Discharge date:  2025    Code Status Order: DNR-CC  Advance Directives: Yes    Admitting Physician: dAonis Mcdowell MD  PCP: Wiliam Mahmood MD    Discharging Nurse: Corbin  Discharging Hospital Unit/Room#: Q7H-5578/3105-01  Discharging Unit Phone Number: [phone]    Emergency Contact:        Past Surgical History:  Past Surgical History:   Procedure Laterality Date    ANKLE SURGERY Right     COLONOSCOPY  2009    ok, dr acosta    ENDOSCOPY, COLON, DIAGNOSTIC  2009    hiatal hernia dr acosta    HIP SURGERY Left 2025    LEFT HIP HEMIARTHROPLASTY ANTERIOR APPROACH performed by Alex Lemons MD at [facility] OR    INGUINAL HERNIA REPAIR N/A 10/13/2017    TONSILLECTOMY AND ADENOIDECTOMY         Immunization History:   Immunization History   Administered Date(s) Administered    COVID-19, MODERNA BLUE border, Primary or Immunocompromised, (age 12y+), IM, 100 mcg/0.5mL 2021    Influenza, FLUAD, (age 65 y+), IM, Quadv, 0.5mL 10/01/2020, 10/01/2021, 10/03/2022, 10/03/2023    Influenza, FLUAD, (age 65 y+), IM, Trivalent PF, 0.5mL 2019    Influenza, FLUZONE High Dose, (age 65 y+), IM, Trivalent PF, 0.5mL 2014, 2014, 2016, 2017, 10/24/2018    Pneumococcal, PCV-13, PREVNAR 13, (age 6w+), IM, 0.5mL 2015    Pneumococcal, PPSV23, PNEUMOVAX 23, (age 2y+), SC/IM, 0.5mL 2007, 2013    Td vaccine (adult) 2007    Zoster Live (Zostavax) 2013    Zoster Recombinant (Shingrix) 2019, 2019       Active Problems:  Principal Problem:    Closed fracture of left hip (HCC)  Resolved Problems:    * No resolved hospital problems. *      Isolation/Infection:       Nurse Assessment:  Last Vital Signs:/79   Pulse 83   Temp 98.2 °F (36.8 °C) (Oral)   Resp 16   Ht 1.702 m (5' 7\")   Wt 63.3 kg (139 lb 8.8 oz)   SpO2 99%

## 2025-02-10 NOTE — CARE COORDINATION
Daughter Tiffany ([phone]) present at hospital and asked about snf beds available at [facility], [facility] and [facility].     [facility], [facility] accepted clinically and left message for [facility].     Electronically signed by SILAS Lomeli, LISW, Case Management on 2/10/2025 at 1:04 PM  [place] [phone]

## 2025-02-10 NOTE — PROGRESS NOTES
[facility]  SLP DYSPHAGIA THERAPY    Patient: Ran Espinoza   : 1931   MRN: [id number]    Treatment Date: 2/10/2025   Admitting Diagnosis:   Closed fracture of left hip, initial encounter ([facility]) [S72.002A]  Hip fracture requiring operative repair, left, sequela [S72.002S]   has a past medical history of Anemia, Cancer ([facility]), GERD (gastroesophageal reflux disease), Heart murmur, and Wears glasses.   has a past surgical history that includes Ankle surgery (Right, ); Colonoscopy (2009); Endoscopy, colon, diagnostic (2009); Tonsillectomy and adenoidectomy; Inguinal hernia repair (N/A, 10/13/2017); and hip surgery (Left, 2025).  Allergies: egg derived products  Dysphagia history: GERD   Baseline History/Prior Level of Function: Living Status: Lives alone; Baseline diet: regular . Pt reports can't eat a lot due to getting full and sense it will come back up. Pt does have GERD history and posture changes-dtr reports curvature of the spine  Onset: 2025     Treatment Diagnosis: Oropharyngeal Dysphagia in a pt with known GERD     CURRENT ENCOUNTER DIAGNOSTICS/COURSE OF ADMISSION     H&P:   History Of Present Illness:     89 y.o. male who presented to [facility] with hip pain, had a mechanical fall at home of 2 he was falling asleep patient presented to ED initially with left hip pain 7 out of 10 intensity worse with any ambulation, imaging positive for hip fracture denies fever chills chest pain or shortness of breath   2025: Left Hip Hemiarthroplasty due to left femoral neck fracture    Current Consultations: Cardiology, PT, OT, orthopedic surgery    Most Recent Imaging:  Chest X-ray: 2/10/2025  IMPRESSION:  Pulmonary edema with basilar opacities and pleural effusions.    CT C-Spine W/O contrast: 2025  FINDINGS:  BONES/ALIGNMENT: There is no acute fracture or traumatic malalignment.   DEGENERATIVE CHANGES: There is moderate disc space narrowing and endplate  osteophyte formation at the

## 2025-02-10 NOTE — PROGRESS NOTES
Patient resting in bed, A&O X4 but can be disoriented at times. VSS. PIV flushed, dressing CDI, Normal saline locked at this time. Patient complains of discomfort but no prominet pain at this time. All other AM medications taken crushed in pudding without complaint (see eMAR).  Dressing to Left hip is intact with old drainage noted. Patient has heel boots in place. Patient tolerating PO intake and appetite adequate. No other needs verbalized at this time. Standard safety precautions in place and call light within reach.

## 2025-02-10 NOTE — PROGRESS NOTES
Occupational Therapy  Facility/Department: [address] ORTHOPEDICS  Occupational Therapy Initial Assessment    Name: Ran Espinoza  : 1931  MRN: [id number]  Date of Service: 2/10/2025    Discharge Recommendations:  Patient able to tolerate 3 hours of therapy per day, Patient would benefit from continued therapy after discharge, 3-5 sessions per week, 5-7 sessions per week, Continue to assess pending progress  OT Equipment Recommendations  Other: defer to d/c facility     Ran Espinoza scored a 16/24 on the AM-PAC ADL Inpatient form. Current research shows that an AM-PAC score of 17 or less is typically not associated with a discharge to the patient's home setting. Based on the patient's AM-PAC score and their current ADL deficits, it is recommended that the patient have 5-7 sessions per week of Occupational Therapy at d/ to increase the patient's independence.  At this time, this patient demonstrates complex nursing, medical, and rehabilitative needs, and would benefit from intensive rehabilitation services upon discharge from the Inpatient setting.  This patient demonstrates the ability to participate in and benefit from an intensive therapy program with a coordinated interdisciplinary team approach to foster frequent, structured, and documented communication among disciplines, who will work together to establish, prioritize, and achieve treatment goals. Please see assessment section for further patient specific details.    If patient discharges prior to next session this note will serve as a discharge summary.  Please see below for the latest assessment towards goals.      Patient Diagnosis(es): The primary encounter diagnosis was Closed fracture of left hip, initial encounter ([facility]). A diagnosis of Nonrheumatic aortic valve stenosis was also pertinent to this visit.  Past Medical History:  has a past medical history of Anemia, Cancer ([facility]), GERD (gastroesophageal reflux disease), Heart murmur, and Wears

## 2025-02-10 NOTE — PROGRESS NOTES
WITHOUT CONTRAST 2/6/2025 7:46 am TECHNIQUE: CT of the left hip was performed without the administration of intravenous contrast.  Multiplanar reformatted images are provided for review. Automated exposure control, iterative reconstruction, and/or weight based adjustment of the mA/kV was utilized to reduce the radiation dose to as low as reasonably achievable. COMPARISON: None. HISTORY ORDERING SYSTEM PROVIDED HISTORY: fall TECHNOLOGIST PROVIDED HISTORY: Reason for exam:->fall Decision Support Exception - unselect if not a suspected or confirmed emergency medical condition->Emergency Medical Condition (MA) Reason for Exam: fell off chair this morning-hit back of head, Left hip pain FINDINGS: Bones: There is comminuted impacted the remainder the visualized osseous structures are intact.  Oblique fracture extending through the subcapital region of the left hip. Soft Tissue: No significant soft tissue edema or fluid collections. Joint: There are moderate degenerative changes manifested by loss of joint space and marginal osteophyte formation.     Subcapital left hip fracture     CT C-Spine W/O Contrast    Result Date: 2/6/2025  EXAMINATION: CT OF THE CERVICAL SPINE WITHOUT CONTRAST 2/6/2025 7:46 am TECHNIQUE: CT of the cervical spine was performed without the administration of intravenous contrast. Multiplanar reformatted images are provided for review. Automated exposure control, iterative reconstruction, and/or weight based adjustment of the mA/kV was utilized to reduce the radiation dose to as low as reasonably achievable. COMPARISON: None. HISTORY: ORDERING SYSTEM PROVIDED HISTORY: fall TECHNOLOGIST PROVIDED HISTORY: Reason for exam:->fall Decision Support Exception - unselect if not a suspected or confirmed emergency medical condition->Emergency Medical Condition (MA) Reason for Exam: fell off chair this morning-hit back of head, Left hip pain FINDINGS: BONES/ALIGNMENT: There is no acute fracture or traumatic  malalignment. DEGENERATIVE CHANGES: There is moderate disc space narrowing and endplate osteophyte formation at the C5/6 and C6/7 levels.  Uncinate spurring contributes to moderate to severe left neural foraminal encroachment at these levels. SOFT TISSUES: There is no prevertebral soft tissue swelling.     No acute abnormality of the cervical spine.     CT Head W/O Contrast    Result Date: 2/6/2025  EXAMINATION: CT OF THE HEAD WITHOUT CONTRAST  2/6/2025 7:46 am TECHNIQUE: CT of the head was performed without the administration of intravenous contrast. Automated exposure control, iterative reconstruction, and/or weight based adjustment of the mA/kV was utilized to reduce the radiation dose to as low as reasonably achievable. COMPARISON: 12/07/2019 HISTORY: ORDERING SYSTEM PROVIDED HISTORY: fall TECHNOLOGIST PROVIDED HISTORY: Reason for exam:->fall Has a \"code stroke\" or \"stroke alert\" been called?->No Decision Support Exception - unselect if not a suspected or confirmed emergency medical condition->Emergency Medical Condition (MA) Reason for Exam: fell off chair this morning-hit back of head, Left hip pain FINDINGS: BRAIN/VENTRICLES: There is no acute intracranial hemorrhage, mass effect or midline shift. No abnormal extra-axial fluid collection.  The gray-white differentiation is maintained without evidence of an acute infarct. There is prominence of the ventricles and sulci due to global parenchymal volume loss. There are nonspecific areas of hypoattenuation within the periventricular and subcortical white matter, which likely represent chronic microvascular ischemic change. ORBITS: The visualized portion of the orbits demonstrate no acute abnormality. SINUSES: The visualized paranasal sinuses and mastoid air cells demonstrate no acute abnormality. SOFT TISSUES/SKULL: No acute abnormality of the visualized skull or soft tissues.     No acute intracranial abnormality.       CBC:   Recent Labs     02/08/25  0610

## 2025-02-10 NOTE — PROGRESS NOTES
Physician Progress Note      PATIENT:               STACY ANSARI  CSN #:                  [id number]  :                       1931  ADMIT DATE:       2025 7:20 AM  DISCH DATE:  RESPONDING  PROVIDER #:        Adonis Mcdowell MD          QUERY TEXT:    Internal Medicine,    Pt admitted with left hip fx and noted documentation of confusion and   agitation. If possible, please document in the progress notes and discharge   summary if you are evaluating and / or treating any of the following:    The medical record reflects the following:  Risk Factors: post op status, hypoxia, ABLA  Clinical Indicators: Intermittent agitation and confusion.  Tried to get out   of bed overnight, Cardiology documents  Pulmonary edema with hypoxia, acute   resp failure with hypoxia documented  Treatment: labs, imaging, Neuro checks, O2, medical management    Thank you  Options provided:  -- Anoxic encephalopathy  -- Drug-induced encephalopathy due to anesthesia, Please specify substance.  -- Metabolic encephalopathy  -- Encephalopathy due to, please document cause  -- Other - I will add my own diagnosis  -- Disagree - Not applicable / Not valid  -- Disagree - Clinically unable to determine / Unknown  -- Refer to Clinical Documentation Reviewer    PROVIDER RESPONSE TEXT:    This patient has metabolic encephalopathy.    Query created by: Bertha Gagnon on 2/10/2025 1:52 PM      QUERY TEXT:    Internal Medicine,    Pt admitted with left hip fx s/p hemiarthroplasty and noted documentation of   post-operative respiratory failure.? In order to support the diagnosis of   acute respiratory failure, please document in progress notes and discharge   summary additional clinical indicators and the cause of the respiratory   failure    The medical record reflects the following:  Risk Factors: post op  Clinical Indicators: Per documentation: \"Acute respiratory failure with   hypoxia multifactorial postop\", no respiratory distress, normal

## 2025-02-10 NOTE — PLAN OF CARE
Problem: Discharge Planning  Goal: Discharge to home or other facility with appropriate resources  2/10/2025 0808 by Yary Azevedo RN  Outcome: Progressing  Flowsheets (Taken 2/9/2025 1545 by Mariam Sung, RN)  Discharge to home or other facility with appropriate resources:   Identify barriers to discharge with patient and caregiver   Identify discharge learning needs (meds, wound care, etc)   Refer to discharge planning if patient needs post-hospital services based on physician order or complex needs related to functional status, cognitive ability or social support system   Arrange for needed discharge resources and transportation as appropriate  2/9/2025 2002 by Liliane Mcgraw RN  Outcome: Progressing  Flowsheets (Taken 2/9/2025 1545 by Mariam Sung RN)  Discharge to home or other facility with appropriate resources:   Identify barriers to discharge with patient and caregiver   Identify discharge learning needs (meds, wound care, etc)   Refer to discharge planning if patient needs post-hospital services based on physician order or complex needs related to functional status, cognitive ability or social support system   Arrange for needed discharge resources and transportation as appropriate     Problem: Pain  Goal: Verbalizes/displays adequate comfort level or baseline comfort level  2/10/2025 0808 by Yary Azevedo RN  Outcome: Progressing  Flowsheets (Taken 2/9/2025 1545 by Mariam Sung, RN)  Verbalizes/displays adequate comfort level or baseline comfort level:   Encourage patient to monitor pain and request assistance   Administer analgesics based on type and severity of pain and evaluate response   Consider cultural and social influences on pain and pain management   Assess pain using appropriate pain scale   Implement non-pharmacological measures as appropriate and evaluate response   Notify Licensed Independent Practitioner if interventions unsuccessful or patient reports new pain  2/9/2025 2002

## 2025-02-11 LAB
BLOOD BANK DISPENSE STATUS: NORMAL
BLOOD BANK DISPENSE STATUS: NORMAL
BLOOD BANK PRODUCT CODE: NORMAL
BLOOD BANK PRODUCT CODE: NORMAL
BPU ID: NORMAL
BPU ID: NORMAL
DESCRIPTION BLOOD BANK: NORMAL
DESCRIPTION BLOOD BANK: NORMAL
HCT VFR BLD AUTO: 27.6 % (ref 40.5–52.5)
HGB BLD-MCNC: 8.9 G/DL (ref 13.5–17.5)

## 2025-02-11 PROCEDURE — 85014 HEMATOCRIT: CPT

## 2025-02-11 PROCEDURE — 85018 HEMOGLOBIN: CPT

## 2025-02-11 PROCEDURE — 6370000000 HC RX 637 (ALT 250 FOR IP): Performed by: INTERNAL MEDICINE

## 2025-02-11 PROCEDURE — 97530 THERAPEUTIC ACTIVITIES: CPT

## 2025-02-11 PROCEDURE — 36415 COLL VENOUS BLD VENIPUNCTURE: CPT

## 2025-02-11 PROCEDURE — 6360000002 HC RX W HCPCS: Performed by: NURSE PRACTITIONER

## 2025-02-11 PROCEDURE — 99233 SBSQ HOSP IP/OBS HIGH 50: CPT | Performed by: INTERNAL MEDICINE

## 2025-02-11 PROCEDURE — 97535 SELF CARE MNGMENT TRAINING: CPT

## 2025-02-11 PROCEDURE — 92526 ORAL FUNCTION THERAPY: CPT

## 2025-02-11 PROCEDURE — 2060000000 HC ICU INTERMEDIATE R&B

## 2025-02-11 PROCEDURE — 97116 GAIT TRAINING THERAPY: CPT

## 2025-02-11 PROCEDURE — 6370000000 HC RX 637 (ALT 250 FOR IP): Performed by: ORTHOPAEDIC SURGERY

## 2025-02-11 RX ORDER — TORSEMIDE 20 MG/1
10 TABLET ORAL
Status: DISCONTINUED | OUTPATIENT
Start: 2025-02-11 | End: 2025-02-13 | Stop reason: HOSPADM

## 2025-02-11 RX ADMIN — FERROUS SULFATE TAB 325 MG (65 MG ELEMENTAL FE) 325 MG: 325 (65 FE) TAB at 15:43

## 2025-02-11 RX ADMIN — ACETAMINOPHEN 1000 MG: 500 TABLET ORAL at 05:37

## 2025-02-11 RX ADMIN — CYCLOBENZAPRINE 10 MG: 10 TABLET, FILM COATED ORAL at 20:04

## 2025-02-11 RX ADMIN — OXYCODONE HYDROCHLORIDE AND ACETAMINOPHEN 500 MG: 500 TABLET ORAL at 08:41

## 2025-02-11 RX ADMIN — ASPIRIN 81 MG CHEWABLE TABLET 81 MG: 81 TABLET CHEWABLE at 08:42

## 2025-02-11 RX ADMIN — ENOXAPARIN SODIUM 40 MG: 100 INJECTION SUBCUTANEOUS at 08:42

## 2025-02-11 RX ADMIN — ACETAMINOPHEN 1000 MG: 500 TABLET ORAL at 12:49

## 2025-02-11 RX ADMIN — ACETAMINOPHEN 1000 MG: 500 TABLET ORAL at 21:05

## 2025-02-11 RX ADMIN — POLYETHYLENE GLYCOL 3350 17 G: 17 POWDER, FOR SOLUTION ORAL at 14:56

## 2025-02-11 RX ADMIN — FERROUS SULFATE TAB 325 MG (65 MG ELEMENTAL FE) 325 MG: 325 (65 FE) TAB at 08:42

## 2025-02-11 RX ADMIN — TORSEMIDE 10 MG: 20 TABLET ORAL at 15:43

## 2025-02-11 ASSESSMENT — PAIN DESCRIPTION - ORIENTATION
ORIENTATION: MID

## 2025-02-11 ASSESSMENT — PAIN DESCRIPTION - FREQUENCY
FREQUENCY: CONTINUOUS

## 2025-02-11 ASSESSMENT — PAIN DESCRIPTION - LOCATION
LOCATION: ABDOMEN;BACK
LOCATION: ABDOMEN;UMBILICUS
LOCATION: ABDOMEN
LOCATION: ABDOMEN;BACK
LOCATION: BACK

## 2025-02-11 ASSESSMENT — PAIN DESCRIPTION - DESCRIPTORS
DESCRIPTORS: ACHING
DESCRIPTORS: ACHING
DESCRIPTORS: STABBING
DESCRIPTORS: ACHING
DESCRIPTORS: ACHING

## 2025-02-11 ASSESSMENT — PAIN DESCRIPTION - PAIN TYPE
TYPE: ACUTE PAIN

## 2025-02-11 ASSESSMENT — PAIN SCALES - GENERAL
PAINLEVEL_OUTOF10: 4
PAINLEVEL_OUTOF10: 3
PAINLEVEL_OUTOF10: 0
PAINLEVEL_OUTOF10: 5
PAINLEVEL_OUTOF10: 6
PAINLEVEL_OUTOF10: 3

## 2025-02-11 NOTE — PROGRESS NOTES
[facility]  Cardiology Consult Note        CC:      Pulmonary edema with hypoxia in the setting of severe aortic stenosis            HPI:   This is a 89 y.o. male admitted with hip fracture after mechanical fall.  Status post hemiarthroplasty.      The patient has had intermittent agitation and confusion.  He is having sinus tachycardia with heart rate in the 120-140.  Hemoglobin 8.2.  Chest x-ray showing pulmonary edema.  The patient treated with IV Lasix.  Vital signs stable.  Blood pressure 123/73 and a pulse of 73 oxygen saturations 98% on 4 L.      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    GERD (gastroesophageal reflux disease)     mild    Heart murmur     Wears glasses     reading      Past Surgical History:   Procedure Laterality Date    ANKLE SURGERY Right 1985    COLONOSCOPY  6/4/2009    ok, dr acosta    ENDOSCOPY, COLON, DIAGNOSTIC  06/04/2009    hiatal hernia dr acosta    HIP SURGERY Left 2/7/2025    LEFT HIP HEMIARTHROPLASTY ANTERIOR APPROACH performed by Alex Lemons MD at [facility] OR    INGUINAL HERNIA REPAIR N/A 10/13/2017    TONSILLECTOMY AND ADENOIDECTOMY        Family History   Problem Relation Age of Onset    Arthritis Mother     Heart Disease Father         pacemaker      Social History     Tobacco Use    Smoking status: Never    Smokeless tobacco: Never   Vaping Use    Vaping status: Never Used   Substance Use Topics    Alcohol use: Yes     Comment: minimal beer. Pt active plays basketball once a week    Drug use: No      Allergies   Allergen Reactions    Egg-Derived Products Other (See Comments)     headaches         Review of Systems -   Constitutional: Negative for weight gain/loss; malaise, fever  Respiratory: Negative for Asthma;  cough and hemoptysis  Cardiovascular: Negative for palpitations,dizziness  basal septal thickening. Normal wall motion. Diastolic dysfunction present with increased LAP with normal LV EF.    Right Ventricle: Right ventricle size is normal. Normal systolic function.    Left Atrium: Left atrium is severely dilated.    Aortic Valve: Trileaflet valve. Severely calcified cusps. Mild regurgitation. Severe stenosis of the aortic valve. AV mean gradient is 33 mmHg. AV peak gradient is 58 mmHg. AV peak velocity is 4.4 m/s. AV Velocity Ratio is 0.21. AV area by continuity VTI is 0.7 cm2. AV area by peak velocity is 0.6 cm2. AV Stroke Volume index is 27.9 mL/m2.    Mitral Valve: Mildly thickened leaflets with mild prolapse. There is a mobile echogenic structure on anterior leaflet that could represent torn chordae however the leaflet only exhibits mild prolapse. Probable severe regurgitation.    Tricuspid Valve: Moderate regurgitation.    Aorta: Normal sized ascending aorta. Mildly dilated aortic root. Ao root diameter is 3.9 cm.        ASSESSMENT AND PLAN:      Acute heart failure  89-year-old gentleman found to have severe aortic stenosis, normal ejection fraction of 60% on the recent echo done 2 days ago presents with marked shortness of breath.  proBNP 4620  Suggest CHF due to aortic stenosis   NYHA class IV  Treated with IV Lasix with good diuresis  Lungs clear  Fluid balance -1.7 L  Admission weight 150 pounds today's weight 139 pounds  Would like him to be on torsemide 10 mg twice a week to prevent heart failure      Severe aortic stenosis  S2 is audible  The patient lives by himself and was functional before the fall and fracture  Looks frail now  I do not think he is a candidate for aortic valve replacement because of frailty and age        High complexity/medical decision making due to acute illness, multiple medical comorbidities, medical management and extensive data review.       SONY Alba M.D  2/11/2025

## 2025-02-11 NOTE — PROGRESS NOTES
V2.0    [facility] [place]      Name:  Ran Espinoza /Age/Sex: 1931  (89 y.o. male)   MRN & CSN:  [id number] & [id number] Encounter Date/Time: 2025 11:10 AM EST   Location:  [id number] PCP: Wiliam Mahmood MD     Attending:Adonis Mcdowell MD       Hospital Day: 6    Assessment and Recommendations   Ran Espinoza is a 89 y.o. male who presents with Closed fracture of left hip (HCC)      Plan:     Left hip fracture, mechanical, pain management, status post hemiarthroplasty on  Ortho continue to follow-up  Anemia, part of it acute blood loss anemia hemoglobin dropped to 7.5 yesterday  Acute respiratory failure with hypoxia multifactorial postop, keep saturation above 90%  Heart murmur with aortic stenosis, cardiology consulted  Elevated troponin, flat, chronically elevated was 61 on  and 72 on 2024  Generalized weakness PT OT.      Diet ADULT DIET; Dysphagia - Soft and Bite Sized; Mildly Thick (Nectar)   DVT Prophylaxis [] Lovenox, []  Heparin, [] SCDs, [] Ambulation,  [] Eliquis, [] Xarelto  [] Coumadin   Code Status DNR-CC             Personally reviewed Lab Studies and Imaging     Discussed management of the case with speech therapy who recommended MBS    Rhythm strip independently interpreted by myself nonsustained beats of V. tach        Medical Decision Making:  The following items were considered in medical decision making:  Discussion of patient care with other providers  Reviewed clinical lab tests  Reviewed radiology tests  Reviewed other diagnostic tests/interventions  Independent review of radiologic images  Microbiology cultures and other micro tests reviewed      Subjective:     Chief Complaint: Hip pain    Ran Espinoza is a 89 y.o. male who presents with hip pain overall improved no chest pain shortness of breath nausea vomiting      Review of Systems:      Pertinent positives and negatives discussed in HPI    Objective:      Intake/Output Summary (Last 24 hours) at 2/11/2025 1110  Last data filed at 2/11/2025 1000  Gross per 24 hour   Intake 620 ml   Output 400 ml   Net 220 ml      Vitals:   Vitals:    02/11/25 0457 02/11/25 0503 02/11/25 0723 02/11/25 1027   BP: (!) 145/77  138/82 121/74   Pulse: 70  91 73   Resp: 17  19 17   Temp: 98.1 °F (36.7 °C)  97.8 °F (36.6 °C) 97.4 °F (36.3 °C)   TempSrc: Oral  Oral Oral   SpO2: 93%  96% 96%   Weight:  63.2 kg (139 lb 5.3 oz)     Height:             Physical Exam:      Physical Exam Performed:    /74   Pulse 73   Temp 97.4 °F (36.3 °C) (Oral)   Resp 17   Ht 1.702 m (5' 7\")   Wt 63.2 kg (139 lb 5.3 oz)   SpO2 96%   BMI 21.82 kg/m²     General appearance: No apparent distress  Respiratory:  Normal respiratory effort. Clear to auscultation, bilaterally without Rales/Wheezes/Rhonchi.  Cardiovascular: Systolic murmur  Abdomen: Soft, non-tender  Musculoskeletal: No clubbing, cyanosis   Skin: Skin color, texture, turgor normal.  No rashes or lesions.  Neurologic: No focal weakness  Psychiatric: Alert and oriented  Capillary Refill: Brisk, 3 seconds, normal   Peripheral Pulses: +2 palpable, equal bilaterally       Medications:   Medications:    acetaminophen  1,000 mg Oral 3 times per day    vitamin C  500 mg Oral Daily    aspirin  81 mg Oral Daily    ferrous sulfate  325 mg Oral BID WC    enoxaparin  40 mg SubCUTAneous Daily      Infusions:    sodium chloride       PRN Meds: cyclobenzaprine, 10 mg, TID PRN  sodium chloride, , PRN  potassium chloride, 40 mEq, PRN   Or  potassium alternative oral replacement, 40 mEq, PRN   Or  potassium chloride, 10 mEq, PRN  magnesium sulfate, 2,000 mg, PRN  ondansetron, 4 mg, Q8H PRN   Or  ondansetron, 4 mg, Q6H PRN  polyethylene glycol, 17 g, Daily PRN        Labs and Imaging   XR CHEST PORTABLE    Result Date: 2/10/2025  EXAMINATION: ONE XRAY VIEW OF THE CHEST 2/9/2025 2:30 am COMPARISON: December 23, 2024 HISTORY: ORDERING SYSTEM PROVIDED HISTORY:

## 2025-02-11 NOTE — PROGRESS NOTES
Physical Therapy    Facility/Department: [address] ORTHOPEDICS    Physical Therapy Treatment note       Name: Ran Espinoza    : 1931    MRN: [id number]    Date of Service: 2025    Assessment / Discharge Recommendations:    -left hip fracture >> hemiarthroplasty  wbat   anterior precautions   -able to get up to walker to initiate ambulation   -pain is moderate with movement and weight bearing   -remains with need for continued PTOT and nursing care in a skilled setting (ARU capable)     Ran Espinoza scored a  on the AM-PAC short mobility form.     Current research shows that an AM-PAC score of 17 or less is typically not associated with a discharge to the patient's home setting.              Patient Diagnosis(es): The primary encounter diagnosis was Closed fracture of left hip, initial encounter (HCC). A diagnosis of Nonrheumatic aortic valve stenosis was also pertinent to this visit.  Past Medical History:  has a past medical history of Anemia, Cancer (HCC), GERD (gastroesophageal reflux disease), Heart murmur, and Wears glasses.  Past Surgical History:  has a past surgical history that includes Ankle surgery (Right, ); Colonoscopy (2009); Endoscopy, colon, diagnostic (2009); Tonsillectomy and adenoidectomy; Inguinal hernia repair (N/A, 10/13/2017); and hip surgery (Left, 2025).      Body Structures, Functions, Activity Limitations Requiring Skilled Therapeutic Intervention: Decreased functional mobility ;Decreased strength;Decreased safe awareness;Decreased endurance;Decreased balance;Increased pain;Decreased ADL status;Decreased posture  Requires PT Follow-Up: Yes  Activity Tolerance  Activity Tolerance: Patient tolerated treatment well    Plan  Physical Therapy Plan  General Plan: 3-5 times per week  Current Treatment Recommendations: Strengthening, Therapeutic activities, Functional mobility training, Transfer training, Gait training, Safety education & training,  Independent  Prior Level of Assist for Homemaking: Independent (Groceries delivered.)  Prior Level of Assist for Ambulation: Independent household ambulator, with or without device, Independent community ambulator, with or without device (With Rollator.)  Prior Level of Assist for Transfers: Independent  Active : No  Patient's  Info: [facility] transport ([facility])  Occupation: Retired  Type of Occupation: Retired : .  Additional Comments: Family (dtrs) live oot.    Cognition   Orientation  Overall Orientation Status: Within Functional Limits    Objective    Observation/Palpation  Observation: Dressing intact L Hip.       Bed mobility  Bed Mobility Comments: not observed at this session - was assisted OOB by nursing staff this morning  Transfers  Sit to Stand: Minimal Assistance;Partial/Moderate assistance (multiple cues for hand placement)  Stand to Sit: Minimal Assistance;Partial/Moderate assistance  Ambulation  Surface: Level tile  Device: Rolling Walker  Assistance: Partial/Moderate assistance  Quality of Gait: step to pattern leading with left LE and very narrow base of support  -able to correct with verbal cues but within a few steps base of support became narrow again  Distance: 10 feet then rest and another 5 feet  More Ambulation?: No  Stairs/Curb  Stairs?: No     Balance  Comments: midline in sitting and static stance on the walker in a very kyphotic posture     -dynamic is poor while using rolling walker and mod assist       AM-PAC Basic Mobility - Inpatient   How much help is needed turning from your back to your side while in a flat bed without using bedrails?: A Little  How much help is needed moving from lying on your back to sitting on the side of a flat bed without using bedrails?: A Lot  How much help is needed moving to and from a bed to a chair?: A Lot  How much help is needed standing up from a chair using your arms?: A Lot  How much help is needed

## 2025-02-11 NOTE — PROGRESS NOTES
Spiritual Health History and Assessment/Progress Note  [facility]    (P) Spiritual/Emotional Needs,  ,  ,      Name: Ran Espinoza MRN: [id number]    Age: 89 y.o.     Sex: male   Language: English   Hinduism: Druze   Closed fracture of left hip (HCC)     Date: 2/11/2025            Total Time Calculated: (P) 19 min              Spiritual Assessment began in [facility]        Referral/Consult From: (P) Nurse   Encounter Overview/Reason: (P) Spiritual/Emotional Needs  Service Provided For: (P) Patient    Mariana, Belief, Meaning:   Patient is connected with a mariana tradition or spiritual practice  Family/Friends No family/friends present      Importance and Influence:  Patient has spiritual/personal beliefs that influence decisions regarding their health  Family/Friends Other: Unknown    Community:  Patient feels well-supported. Support system includes: Spouse/Partner and Children  Family/Friends feel well-supported. Support system includes: Other: Sisters supportive of one another, and parents    Assessment and Plan of Care:   Patient Interventions include: Facilitated expression of thoughts and feelings and Explored spiritual coping/struggle/distress  Family/Friends Interventions include: Other: Space for dter to share thoughts and hopes for her father    Patient Plan of Care: No spiritual needs identified for follow-up  Family/Friends Plan of Care: No spiritual needs identified for follow-up    Electronically signed by KRISTY Santana on 2/11/2025 at 1:27 PM

## 2025-02-11 NOTE — PROGRESS NOTES
Occupational Therapy  Facility/Department: [address] ORTHOPEDICS  Occupational Therapy Treatment Note    Name: Ran Espinoza  : 1931  MRN: [id number]  Date of Service: 2025    Discharge Recommendations:  Patient able to tolerate 3 hours of therapy per day, Patient would benefit from continued therapy after discharge, 3-5 sessions per week, 5-7 sessions per week  OT Equipment Recommendations  Other: defer to d/c facility     Ran Espinoza scored a 16/24 on the AM-PAC ADL Inpatient form. Current research shows that an AM-PAC score of 17 or less is typically not associated with a discharge to the patient's home setting. Based on the patient's AM-PAC score and their current ADL deficits, it is recommended that the patient have 5-7 sessions per week of Occupational Therapy at d/ to increase the patient's independence.  At this time, this patient demonstrates complex nursing, medical, and rehabilitative needs, and would benefit from intensive rehabilitation services upon discharge from the Inpatient setting.  This patient demonstrates the ability to participate in and benefit from an intensive therapy program with a coordinated interdisciplinary team approach. Please see assessment section for further patient specific details.    If patient discharges prior to next session this note will serve as a discharge summary.  Please see below for the latest assessment towards goals.     Patient Diagnosis(es): The primary encounter diagnosis was Closed fracture of left hip, initial encounter ([facility]). A diagnosis of Nonrheumatic aortic valve stenosis was also pertinent to this visit.  Past Medical History:  has a past medical history of Anemia, Cancer ([facility]), GERD (gastroesophageal reflux disease), Heart murmur, and Wears glasses.  Past Surgical History:  has a past surgical history that includes Ankle surgery (Right, ); Colonoscopy (2009); Endoscopy, colon, diagnostic (2009); Tonsillectomy and  some  Cognition Comment: [place]  Orientation  Overall Orientation Status: Within Functional Limits               Static Standing Balance Exercises: Min A at RW with BUE support in prep for fxl mobility - kyphotic  Education Given To: Patient;Family  Education Provided: Role of Therapy;Plan of Care;Mobility Training;Fall Prevention Strategies;Transfer Training  Education Provided Comments: safe transfers; use of incentive spirometer (completes x5 reps up to 1500mL); importance of cont OOB activity; limiting purewick use  Education Method: Demonstration;Verbal  Barriers to Learning: Hearing ( insight)  Education Outcome: Verbalized understanding;Demonstrated understanding;Continued education needed                   AM-PAC - ADL  AM-PAC Daily Activity - Inpatient   How much help is needed for putting on and taking off regular lower body clothing?: A Lot  How much help is needed for bathing (which includes washing, rinsing, drying)?: A Lot  How much help is needed for toileting (which includes using toilet, bedpan, or urinal)?: A Lot  How much help is needed for putting on and taking off regular upper body clothing?: A Little  How much help is needed for taking care of personal grooming?: A Little  How much help for eating meals?: None  AM-PAC Inpatient Daily Activity Raw Score: 16  AM-PAC Inpatient ADL T-Scale Score : 35.96  ADL Inpatient CMS 0-100% Score: 53.32  ADL Inpatient CMS G-Code Modifier : CK    Goals  Short Term Goals  Time Frame for Short Term Goals: prior to d/c: ongoing/progressing   Short Term Goal 1: modA x1 bed mobility in prep for fxl ADL tx  Short Term Goal 2: modA x1 fxl tx to LRAD in prep to complete ADL task  Short Term Goal 3: modA LB dressing/bathing with AE use prn  Short Term Goal 4: modA toileting  Short Term Goal 5: Pt will be able to stand x1-2 minutes with modA x1 in prep to complete ADL task to increase balance/endurance  Long Term Goals  Time Frame for Long Term Goals :

## 2025-02-11 NOTE — PROGRESS NOTES
Patient has been A&Ox4 for this RN with no signs of disorientation, patient has not attempted to climb out of bed/chair. Patient calls appropriately. Telecamera discontinued.

## 2025-02-11 NOTE — PROGRESS NOTES
Comprehensive Nutrition Assessment    Type and Reason for Visit:  Initial, Wound (decreased appetite noted and dysphagia)    Nutrition Recommendations/Plan:   Diet textures per SLP  Encourage fluid intake, patient consuming 800 ml consumed per flow sheets  Continue and encourage Magic cup to promote calorie and protein intake for wound healing and further weight loss     Malnutrition Assessment:  Malnutrition Status:  At risk for malnutrition (02/11/25 1328)    Context:  Acute Illness     Findings of the 6 clinical characteristics of malnutrition:  Energy Intake:  Unable to assess (dropped from 75% to 25% in the past 2 days)  Weight Loss:  Greater than 2% over 1 week     Body Fat Loss:  Unable to assess     Muscle Mass Loss:  Unable to assess    Fluid Accumulation:  Unable to assess     Strength:  Not Performed    Nutrition Assessment:    Patient admitted with closed fracture of left hip, repaired on 2/7.  Currently on a dysphagia soft and bite size textured diet with mildly thick liquids per SLP.  Magic cup bid also ordered.  Patient with increased protein needs for wound healing.  Per EMR, patient is eating full quickly ue to feeling the food will come back up, history of GERD.  Nutrition and fluid intake concerning.  Will closely monitor nutrition progress, need for alternate nutrition if appropriate and goals of care.    Nutrition Related Findings:    Na 145 on 2/10 Wound Type: Stage II (redness on sacrum and heels; stage 2 right ankle)       Current Nutrition Intake & Therapies:    Average Meal Intake: 26-50%  Average Supplements Intake: Unable to assess  ADULT DIET; Dysphagia - Soft and Bite Sized; Mildly Thick ([place])  ADULT ORAL NUTRITION SUPPLEMENT; Dinner, Lunch; Frozen Oral Supplement    Anthropometric Measures:  Height: 170.2 cm (5' 7\")  Ideal Body Weight (IBW): 148 lbs (67 kg)       Current Body Weight: 63.2 kg (139 lb 5.3 oz),   IBW. Weight Source: Bed scale  Current BMI (kg/m2): 21.8

## 2025-02-11 NOTE — PLAN OF CARE
Problem: Discharge Planning  Goal: Discharge to home or other facility with appropriate resources  2/11/2025 0727 by Yary Azevedo RN  Outcome: Progressing  Flowsheets (Taken 2/11/2025 0250 by Sylvia Madera, RN)  Discharge to home or other facility with appropriate resources:   Identify barriers to discharge with patient and caregiver   Identify discharge learning needs (meds, wound care, etc)  2/11/2025 0250 by Sylvia Madera RN  Outcome: Progressing  Flowsheets (Taken 2/11/2025 0250)  Discharge to home or other facility with appropriate resources:   Identify barriers to discharge with patient and caregiver   Identify discharge learning needs (meds, wound care, etc)     Problem: Pain  Goal: Verbalizes/displays adequate comfort level or baseline comfort level  2/11/2025 0727 by Yary Azevedo RN  Outcome: Progressing  Flowsheets (Taken 2/11/2025 0250 by Sylvia Madera, RN)  Verbalizes/displays adequate comfort level or baseline comfort level:   Encourage patient to monitor pain and request assistance   Assess pain using appropriate pain scale   Administer analgesics based on type and severity of pain and evaluate response  2/11/2025 0250 by Sylvia Madera RN  Outcome: Progressing  Flowsheets (Taken 2/11/2025 0250)  Verbalizes/displays adequate comfort level or baseline comfort level:   Encourage patient to monitor pain and request assistance   Assess pain using appropriate pain scale   Administer analgesics based on type and severity of pain and evaluate response     Problem: Safety - Adult  Goal: Free from fall injury  2/11/2025 0727 by Yary Azevedo RN  Outcome: Progressing  Flowsheets (Taken 2/11/2025 0250 by Sylvia Madera, RN)  Free From Fall Injury: Instruct family/caregiver on patient safety  2/11/2025 0250 by Sylvia Madera RN  Outcome: Progressing  Flowsheets (Taken 2/11/2025 0250)  Free From Fall Injury: Instruct family/caregiver on patient safety

## 2025-02-11 NOTE — PROGRESS NOTES
[facility]  [facility] DYSPHAGIA THERAPY    Patient: Ran Espinoza   : 1931   MRN: [id number]    Treatment Date: 2025   Admitting Diagnosis:   Closed fracture of left hip, initial encounter ([facility]) [S72.002A]  Hip fracture requiring operative repair, left, sequela [S72.002S]   has a past medical history of Anemia, Cancer ([facility]), GERD (gastroesophageal reflux disease), Heart murmur, and Wears glasses.   has a past surgical history that includes Ankle surgery (Right, ); Colonoscopy (2009); Endoscopy, colon, diagnostic (2009); Tonsillectomy and adenoidectomy; Inguinal hernia repair (N/A, 10/13/2017); and hip surgery (Left, 2025).  Allergies: egg derived products  Dysphagia history: GERD   Baseline History/Prior Level of Function: Living Status: Lives alone; Baseline diet: regular . Pt reports can't eat a lot due to getting full and sense it will come back up. Pt does have GERD history and posture changes-dtr reports curvature of the spine  Onset: 2025     Treatment Diagnosis: Oropharyngeal Dysphagia in a pt with known GERD     CURRENT ENCOUNTER DIAGNOSTICS/COURSE OF ADMISSION     H&P:   History Of Present Illness:     89 y.o. male who presented to [facility] with hip pain, had a mechanical fall at home of 2 he was falling asleep patient presented to ED initially with left hip pain 7 out of 10 intensity worse with any ambulation, imaging positive for hip fracture denies fever chills chest pain or shortness of breath   2025: Left Hip Hemiarthroplasty due to left femoral neck fracture    Current Consultations: Cardiology, PT, OT, orthopedic surgery    Most Recent Imaging:  Chest X-ray: 2/10/2025  IMPRESSION:  Pulmonary edema with basilar opacities and pleural effusions.    CT C-Spine W/O contrast: 2025  FINDINGS:  BONES/ALIGNMENT: There is no acute fracture or traumatic malalignment.   DEGENERATIVE CHANGES: There is moderate disc space narrowing and endplate  osteophyte formation at the  C5/6 and C6/7 levels.  Uncinate spurring  contributes to moderate to severe left neural foraminal encroachment at these  levels.   SOFT TISSUES: There is no prevertebral soft tissue swelling.    Head CT:  2/6/2025  FINDINGS:  BRAIN/VENTRICLES: There is no acute intracranial hemorrhage, mass effect or  midline shift. No abnormal extra-axial fluid collection.  The gray-white  differentiation is maintained without evidence of an acute infarct. There is  prominence of the ventricles and sulci due to global parenchymal volume loss.  There are nonspecific areas of hypoattenuation within the periventricular and  subcortical white matter, which likely represent chronic microvascular  ischemic change.    Current Diet Level : Soft and Bite-sized texture, Mildly/Nectar Thick liquids  via cup    Respiratory Status: O2 via nasal cannula during this session    Reason for initial referral: SLP evaluation orders received due to staff and family reports of swallowing problems    SUBJECTIVE     General:Per documentation pt with fluctuating need for O2 . Currently on O2 during this session  Comments regarding diet toleration:   Patient: Pt reports does not like the thickener. Pt reports gets full fast and sensation will come back up  Family: Pt reports pt does not like the thickener. Family reports reflux and curvature of the spine  Staff: no new reports by staff    Pain: general left hip pain    Dentition:poor dentition/missing dentition  Vision: Adequate for assessment/tx needs  Hearing: [facility]    TREATMENT SUMMARY / IMPRESSIONS     Dysphagia Therapy Impressions  Diagnosis: Oropharyngeal Dysphagia   OROPHARYNGEAL DYSPHAGIA DIAGNOSIS: Initial MARILU revealed: Patient presents with slow prolonged mastication due to missing teeth, concern for excess labor with regular textures, at risk for premature loss of bolus, delayed swallow initiation, decreased laryngeal elevation, immediate weak cough with thin liquids, no other overt s/s of

## 2025-02-11 NOTE — PROGRESS NOTES
Patient assisted from bed to Chair with TABITHA schneider&CARMELLA X4. VSS. PIV flushed, dressing CDI, Normal saline locked at this time. No complaints of pain at this time. All other AM medications taken whole without complaint (see eMAR). Dressing to left hip is intact and old drainage noted. Patient tolerating PO intake and appetite adequate. No other needs verbalized at this time. Standard safety precautions in place and call light within reach.

## 2025-02-11 NOTE — CARE COORDINATION
[facility] have beds. [facility] does not have beds and does not anticipate any beds until possibly the end of next week.   Informed patient and daughter Tiffany regarding above. They will plan for [facility]. Patient/Daughter aware of possible discharge tomorrow.  Nazia at [facility] is aware of possible discharge tomorrow.     Electronically signed by Jennifer Lemons, SILAS, LISW, Case Management on 2/11/2025 at 11:36 AM  [place] [phone]

## 2025-02-11 NOTE — PROGRESS NOTES
[facility] Orthopedic Surgery   Progress Note      S/P :  SUBJECTIVE  up in chair. Alert and oriented to self and hospital. Forgetful of events. . Pain is   described in left hip and with the intensity of mild. Pain is described as aching.       OBJECTIVE              Physical                      VITALS:  /74   Pulse 73   Temp 97.4 °F (36.3 °C) (Oral)   Resp 17   Ht 1.702 m (5' 7\")   Wt 63.2 kg (139 lb 5.3 oz)   SpO2 96%   BMI 21.82 kg/m²                     MUSCULOSKELETAL:  left foot NVI. Wiggles toes Left foot warm and pink                   NEUROLOGIC:                                  Sensory:  Touch:  Left Lower Extremity:  normal                                                 Surgical wound appears with scant red on gauze and tape ddressing left anterior hip. Ice pack on.     Data       CBC:   Lab Results   Component Value Date/Time    WBC 5.1 02/10/2025 05:48 AM    RBC 2.35 02/10/2025 05:48 AM    RBC 4.01 01/03/2017 01:52 PM    HGB 7.5 02/10/2025 05:48 AM    HCT 22.7 02/10/2025 05:48 AM    MCV 96.9 02/10/2025 05:48 AM    MCH 32.1 02/10/2025 05:48 AM    MCHC 33.1 02/10/2025 05:48 AM    RDW 17.9 02/10/2025 05:48 AM     02/10/2025 05:48 AM    MPV 7.1 02/10/2025 05:48 AM        WBC:    Lab Results   Component Value Date/Time    WBC 5.1 02/10/2025 05:48 AM        Hemoglobin/Hematocrit:    Lab Results   Component Value Date/Time    HGB 7.5 02/10/2025 05:48 AM    HCT 22.7 02/10/2025 05:48 AM        PT/INR:    Lab Results   Component Value Date/Time    PROTIME 13.6 02/06/2025 08:28 AM    INR 1.02 02/06/2025 08:28 AM              Current Inpatient Medications             Current Facility-Administered Medications: cyclobenzaprine (FLEXERIL) tablet 10 mg, 10 mg, Oral, TID PRN  acetaminophen (TYLENOL) tablet 1,000 mg, 1,000 mg, Oral, 3 times per day  ascorbic acid (VITAMIN C) tablet 500 mg, 500 mg, Oral, Daily  aspirin chewable tablet 81 mg, 81 mg, Oral, Daily  ferrous sulfate (IRON 325) tablet 325 mg,  Pt transferred to the scheduling department to get appt

## 2025-02-11 NOTE — PLAN OF CARE
Problem: Discharge Planning  Goal: Discharge to home or other facility with appropriate resources  Outcome: Progressing  Flowsheets (Taken 2/11/2025 0250)  Discharge to home or other facility with appropriate resources:   Identify barriers to discharge with patient and caregiver   Identify discharge learning needs (meds, wound care, etc)     Problem: Pain  Goal: Verbalizes/displays adequate comfort level or baseline comfort level  Outcome: Progressing  Flowsheets (Taken 2/11/2025 0250)  Verbalizes/displays adequate comfort level or baseline comfort level:   Encourage patient to monitor pain and request assistance   Assess pain using appropriate pain scale   Administer analgesics based on type and severity of pain and evaluate response     Problem: Safety - Adult  Goal: Free from fall injury  Outcome: Progressing  Flowsheets (Taken 2/11/2025 0250)  Free From Fall Injury: Instruct family/caregiver on patient safety     Problem: ABCDS Injury Assessment  Goal: Absence of physical injury  Outcome: Progressing  Flowsheets (Taken 2/11/2025 0250)  Absence of Physical Injury: Implement safety measures based on patient assessment     Problem: Skin/Tissue Integrity  Goal: Skin integrity remains intact  Description: 1.  Monitor for areas of redness and/or skin breakdown  2.  Assess vascular access sites hourly  3.  Every 4-6 hours minimum:  Change oxygen saturation probe site  4.  Every 4-6 hours:  If on nasal continuous positive airway pressure, respiratory therapy assess nares and determine need for appliance change or resting period  Outcome: Progressing  Flowsheets (Taken 2/11/2025 0250)  Skin Integrity Remains Intact: Monitor for areas of redness and/or skin breakdown     Problem: Cardiovascular - Adult  Goal: Maintains optimal cardiac output and hemodynamic stability  Outcome: Progressing  Flowsheets (Taken 2/11/2025 0250)  Maintains optimal cardiac output and hemodynamic stability: Monitor blood pressure and heart

## 2025-02-11 NOTE — CARE COORDINATION
Received call from Amina at [facility]. She reported they may have a bed for patient on Thursday or Friday. Patient's wife lives at this facility.Will need to follow up with [facility] closer to patient's discharge about bed availability.     Electronically signed by SILAS Lomeli, MUMTAZW, Case Management on 2/11/2025 at 2:05 PM  [place] [phone]

## 2025-02-12 ENCOUNTER — APPOINTMENT (OUTPATIENT)
Dept: GENERAL RADIOLOGY | Age: 89
DRG: 521 | End: 2025-02-12
Payer: MEDICARE

## 2025-02-12 LAB
ALBUMIN SERPL-MCNC: 3 G/DL (ref 3.4–5)
ALBUMIN/GLOB SERPL: 1.2 {RATIO} (ref 1.1–2.2)
ALP SERPL-CCNC: 98 U/L (ref 40–129)
ALT SERPL-CCNC: 8 U/L (ref 10–40)
ANION GAP SERPL CALCULATED.3IONS-SCNC: 9 MMOL/L (ref 3–16)
AST SERPL-CCNC: 29 U/L (ref 15–37)
BASOPHILS # BLD: 0 K/UL (ref 0–0.2)
BASOPHILS NFR BLD: 0.6 %
BILIRUB SERPL-MCNC: 0.5 MG/DL (ref 0–1)
BUN SERPL-MCNC: 30 MG/DL (ref 7–20)
CALCIUM SERPL-MCNC: 8.5 MG/DL (ref 8.3–10.6)
CHLORIDE SERPL-SCNC: 105 MMOL/L (ref 99–110)
CO2 SERPL-SCNC: 30 MMOL/L (ref 21–32)
CREAT SERPL-MCNC: 0.9 MG/DL (ref 0.8–1.3)
DEPRECATED RDW RBC AUTO: 17.7 % (ref 12.4–15.4)
EOSINOPHIL # BLD: 0.1 K/UL (ref 0–0.6)
EOSINOPHIL NFR BLD: 1.3 %
GFR SERPLBLD CREATININE-BSD FMLA CKD-EPI: 79 ML/MIN/{1.73_M2}
GLUCOSE SERPL-MCNC: 112 MG/DL (ref 70–99)
HCT VFR BLD AUTO: 26.5 % (ref 40.5–52.5)
HGB BLD-MCNC: 8.8 G/DL (ref 13.5–17.5)
LYMPHOCYTES # BLD: 0.2 K/UL (ref 1–5.1)
LYMPHOCYTES NFR BLD: 3.2 %
MCH RBC QN AUTO: 32.5 PG (ref 26–34)
MCHC RBC AUTO-ENTMCNC: 33.1 G/DL (ref 31–36)
MCV RBC AUTO: 98.3 FL (ref 80–100)
MONOCYTES # BLD: 0.3 K/UL (ref 0–1.3)
MONOCYTES NFR BLD: 5.6 %
NEUTROPHILS # BLD: 4.6 K/UL (ref 1.7–7.7)
NEUTROPHILS NFR BLD: 89.3 %
PLATELET # BLD AUTO: 211 K/UL (ref 135–450)
PMV BLD AUTO: 6.9 FL (ref 5–10.5)
POTASSIUM SERPL-SCNC: 4 MMOL/L (ref 3.5–5.1)
PROT SERPL-MCNC: 5.6 G/DL (ref 6.4–8.2)
RBC # BLD AUTO: 2.7 M/UL (ref 4.2–5.9)
SODIUM SERPL-SCNC: 144 MMOL/L (ref 136–145)
WBC # BLD AUTO: 5.2 K/UL (ref 4–11)

## 2025-02-12 PROCEDURE — 97129 THER IVNTJ 1ST 15 MIN: CPT

## 2025-02-12 PROCEDURE — 97530 THERAPEUTIC ACTIVITIES: CPT

## 2025-02-12 PROCEDURE — 6360000002 HC RX W HCPCS: Performed by: NURSE PRACTITIONER

## 2025-02-12 PROCEDURE — 97535 SELF CARE MNGMENT TRAINING: CPT

## 2025-02-12 PROCEDURE — 74230 X-RAY XM SWLNG FUNCJ C+: CPT

## 2025-02-12 PROCEDURE — 85025 COMPLETE CBC W/AUTO DIFF WBC: CPT

## 2025-02-12 PROCEDURE — 6370000000 HC RX 637 (ALT 250 FOR IP): Performed by: ORTHOPAEDIC SURGERY

## 2025-02-12 PROCEDURE — 97116 GAIT TRAINING THERAPY: CPT

## 2025-02-12 PROCEDURE — 80053 COMPREHEN METABOLIC PANEL: CPT

## 2025-02-12 PROCEDURE — 2060000000 HC ICU INTERMEDIATE R&B

## 2025-02-12 PROCEDURE — 36415 COLL VENOUS BLD VENIPUNCTURE: CPT

## 2025-02-12 PROCEDURE — 6370000000 HC RX 637 (ALT 250 FOR IP): Performed by: INTERNAL MEDICINE

## 2025-02-12 PROCEDURE — 92611 MOTION FLUOROSCOPY/SWALLOW: CPT

## 2025-02-12 RX ADMIN — ACETAMINOPHEN 1000 MG: 500 TABLET ORAL at 14:21

## 2025-02-12 RX ADMIN — ASPIRIN 81 MG CHEWABLE TABLET 81 MG: 81 TABLET CHEWABLE at 08:47

## 2025-02-12 RX ADMIN — FERROUS SULFATE TAB 325 MG (65 MG ELEMENTAL FE) 325 MG: 325 (65 FE) TAB at 08:47

## 2025-02-12 RX ADMIN — FERROUS SULFATE TAB 325 MG (65 MG ELEMENTAL FE) 325 MG: 325 (65 FE) TAB at 17:00

## 2025-02-12 RX ADMIN — ACETAMINOPHEN 1000 MG: 500 TABLET ORAL at 06:01

## 2025-02-12 RX ADMIN — OXYCODONE HYDROCHLORIDE AND ACETAMINOPHEN 500 MG: 500 TABLET ORAL at 08:47

## 2025-02-12 RX ADMIN — ACETAMINOPHEN 1000 MG: 500 TABLET ORAL at 23:07

## 2025-02-12 RX ADMIN — ENOXAPARIN SODIUM 40 MG: 100 INJECTION SUBCUTANEOUS at 08:47

## 2025-02-12 ASSESSMENT — PAIN DESCRIPTION - LOCATION
LOCATION: HIP
LOCATION: HIP

## 2025-02-12 ASSESSMENT — PAIN SCALES - GENERAL
PAINLEVEL_OUTOF10: 7
PAINLEVEL_OUTOF10: 0
PAINLEVEL_OUTOF10: 5
PAINLEVEL_OUTOF10: 0

## 2025-02-12 ASSESSMENT — PAIN DESCRIPTION - FREQUENCY
FREQUENCY: CONTINUOUS
FREQUENCY: CONTINUOUS

## 2025-02-12 ASSESSMENT — PAIN DESCRIPTION - PAIN TYPE
TYPE: ACUTE PAIN
TYPE: ACUTE PAIN

## 2025-02-12 ASSESSMENT — PAIN DESCRIPTION - DESCRIPTORS
DESCRIPTORS: ACHING
DESCRIPTORS: ACHING

## 2025-02-12 ASSESSMENT — PAIN DESCRIPTION - ORIENTATION
ORIENTATION: LEFT
ORIENTATION: LEFT

## 2025-02-12 ASSESSMENT — PAIN DESCRIPTION - ONSET: ONSET: GRADUAL

## 2025-02-12 NOTE — PROGRESS NOTES
Patient resting in bed this morning with no complaints of pain.  Scheduled morning medications administered per orders. See eMAR for documentation of medication administration. Patient tolerated medications whole w/ water w/o complication. IV to the L arm saline locked at this time. Alcohol cap in place.     Head to toe assessment completed and charted. See flowsheets for documentation.     Patient updated with plan of care for the shift, denies questions. Patient denies further physical/emotional needs at this time. Call light, telephone, and bed side table are within reach. Fall precautions in place. Will continue to monitor and assess.

## 2025-02-12 NOTE — PROGRESS NOTES
Patient resting in bed this morning w/ increased confusion. Patient believes he is in a moving vehicle, disoriented to place and situation. Patient is able to report that it is Wednesday, but unable to state the date. Patient A&O to name & birthday. Per PCA Vivian this is substantially different from patient's baseline yesterday, 2/11.     Message sent to MD Mcdowell to alert to change in cognition. NP Nayeli Gtz, orthopedics, made aware face to face. Waiting for response from MD. No new orders from NP at this time.     Update provided to patient's primary RN Corbin.     Will continue to monitor and assess.

## 2025-02-12 NOTE — PROGRESS NOTES
Patient resting in chair this afternoon, eating dinner.  Diet orders changed to nectar-thick liquids with pureed foods.  No desire for prn pain medications at this time.  Scheduled medications given as ordered.  Please see eMAR for documentation details.  Daughter sitting with patient.  No further concerns or requests from patient or family.

## 2025-02-12 NOTE — PROGRESS NOTES
Occupational Therapy  Facility/Department: [address] ORTHOPEDICS  Occupational Therapy Treatment Note    Name: Ran Espinoza  : 1931  MRN: [id number]  Date of Service: 2025    Discharge Recommendations:  Patient able to tolerate 3 hours of therapy per day, Patient would benefit from continued therapy after discharge, 3-5 sessions per week, 5-7 sessions per week  OT Equipment Recommendations  Other: defer to d/c facility     Ran Espinoza scored a 15/24 on the AM-PAC ADL Inpatient form. Current research shows that an AM-PAC score of 17 or less is typically not associated with a discharge to the patient's home setting. Based on the patient's AM-PAC score and their current ADL deficits, it is recommended that the patient have 3-5 sessions per week of Occupational Therapy at d/c to increase the patient's independence.  Please see assessment section for further patient specific details.    If patient discharges prior to next session this note will serve as a discharge summary.  Please see below for the latest assessment towards goals.     Patient Diagnosis(es): The primary encounter diagnosis was Closed fracture of left hip, initial encounter (HCC). A diagnosis of Nonrheumatic aortic valve stenosis was also pertinent to this visit.  Past Medical History:  has a past medical history of Anemia, Cancer (HCC), GERD (gastroesophageal reflux disease), Heart murmur, and Wears glasses.  Past Surgical History:  has a past surgical history that includes Ankle surgery (Right, ); Colonoscopy (2009); Endoscopy, colon, diagnostic (2009); Tonsillectomy and adenoidectomy; Inguinal hernia repair (N/A, 10/13/2017); and hip surgery (Left, 2025).    Treatment Diagnosis: decreased fxl mobility/ADLs/transfers/balance    Assessment  Performance deficits / Impairments: Decreased functional mobility ;Decreased ADL status;Decreased endurance;Decreased balance;Decreased strength;Decreased safe  Given To: Patient;Family  Education Provided: Role of Therapy;Plan of Care;Mobility Training;Fall Prevention Strategies;Transfer Training  Education Provided Comments: safe transfers; importance of cont OOB activity  Education Method: Demonstration;Verbal  Barriers to Learning: Hearing ( insight)  Education Outcome: Verbalized understanding;Demonstrated understanding;Continued education needed                   AM-PAC - ADL  AM-PAC Daily Activity - Inpatient   How much help is needed for putting on and taking off regular lower body clothing?: A Lot  How much help is needed for bathing (which includes washing, rinsing, drying)?: A Lot  How much help is needed for toileting (which includes using toilet, bedpan, or urinal)?: A Lot  How much help is needed for putting on and taking off regular upper body clothing?: A Little  How much help is needed for taking care of personal grooming?: A Little  How much help for eating meals?: A Little  AM-PAC Inpatient Daily Activity Raw Score: 15  AM-PAC Inpatient ADL T-Scale Score : 34.69  ADL Inpatient CMS 0-100% Score: 56.46  ADL Inpatient CMS G-Code Modifier : CK    Goals  Short Term Goals  Time Frame for Short Term Goals: prior to d/c: ongoing/progressing   Short Term Goal 1: modA x1 bed mobility in prep for fxl ADL tx  Short Term Goal 2: modA x1 fxl tx to LRAD in prep to complete ADL task  Short Term Goal 3: modA LB dressing/bathing with AE use prn  Short Term Goal 4: modA toileting  Short Term Goal 5: Pt will be able to stand x1-2 minutes with modA x1 in prep to complete ADL task to increase balance/endurance  Long Term Goals  Time Frame for Long Term Goals : STGs=LTGs  Patient Goals   Patient goals : to return home and to get stronger      Therapy Time   Individual Concurrent Group Co-treatment   Time In 1505         Time Out 1545         Minutes 40         Timed Code Treatment Minutes: 40 Minutes (25 ADl 15 TA)       VANCE Rudolph, OTR/L

## 2025-02-12 NOTE — PLAN OF CARE
Problem: Discharge Planning  Goal: Discharge to home or other facility with appropriate resources  2/12/2025 1146 by Corbin Moreno, RN  Outcome: Progressing  Flowsheets (Taken 2/11/2025 2348 by Sylvia Madera, RN)  Discharge to home or other facility with appropriate resources:   Identify barriers to discharge with patient and caregiver   Identify discharge learning needs (meds, wound care, etc)  2/11/2025 2348 by Sylvia Madera, RN  Outcome: Progressing  Flowsheets (Taken 2/11/2025 2348)  Discharge to home or other facility with appropriate resources:   Identify barriers to discharge with patient and caregiver   Identify discharge learning needs (meds, wound care, etc)     Problem: Pain  Goal: Verbalizes/displays adequate comfort level or baseline comfort level  2/12/2025 1146 by Corbin Moreno, RN  Outcome: Progressing  Flowsheets (Taken 2/11/2025 2348 by Sylvia Madera, RN)  Verbalizes/displays adequate comfort level or baseline comfort level:   Encourage patient to monitor pain and request assistance   Assess pain using appropriate pain scale   Administer analgesics based on type and severity of pain and evaluate response  2/11/2025 2348 by Sylvia Madera, RN  Outcome: Progressing  Flowsheets (Taken 2/11/2025 2348)  Verbalizes/displays adequate comfort level or baseline comfort level:   Encourage patient to monitor pain and request assistance   Assess pain using appropriate pain scale   Administer analgesics based on type and severity of pain and evaluate response     Problem: Safety - Adult  Goal: Free from fall injury  2/12/2025 1146 by Corbin Moreno, RN  Outcome: Progressing  Flowsheets (Taken 2/11/2025 2348 by Sylvia Madera, RN)  Free From Fall Injury: Instruct family/caregiver on patient safety  2/11/2025 2348 by Sylvia Madera, RN  Outcome: Progressing  Flowsheets (Taken 2/11/2025 2348)  Free From Fall Injury: Instruct family/caregiver on patient safety     Problem: ABCDS

## 2025-02-12 NOTE — PROCEDURES
Facility/Department: [address] ORTHOPEDICS  MODIFIED BARIUM SWALLOW EVALUATION    Patient: Ran Espinoza   : 1931   MRN: [id number]      Evaluation Date: 2025   Ordering MD: July  Radiologist: Curtis  SLP:  RACHEL Callejas     Admitting Diagnosis: Closed fracture of left hip, initial encounter ([facility]) [S72.002A]  Hip fracture requiring operative repair, left, sequela [S72.002S]   has a past medical history of Anemia, Cancer ([facility]), GERD (gastroesophageal reflux disease), Heart murmur, and Wears glasses.   has a past surgical history that includes Ankle surgery (Right, ); Colonoscopy (2009); Endoscopy, colon, diagnostic (2009); Tonsillectomy and adenoidectomy; Inguinal hernia repair (N/A, 10/13/2017); and hip surgery (Left, 2025).  Allergies:  egg derived products  Dysphagia history: GERD   Baseline History/Prior Level of Function: Living Status: Lives alone; Baseline diet: regular . Pt reports can't eat a lot due to getting full and sense it will come back up. Pt does have GERD history and posture changes-dtr reports curvature of the spine    Date of Onset: 2025    Treatment Diagnosis: Dysphagia   Type of Study: Modified Barium Swallowing Study (MBS)  Diet Prior to Study: Soft and Bite-sized texture, Mildly/Nectar Thick liquids  via cup    Reason for MBSS: further assess oropharyngeal dysphagia  Pt complaint: patient continues to endorse distaste for thickened liquids; otherwise, no complaints/concerns    Pertinent diagnostics/medical course:   H&P: History Of Present Illness:  \"89 y.o. male who presented to [facility] with hip pain, had a mechanical fall at home of 2 he was falling asleep patient presented to ED initially with left hip pain 7 out of 10 intensity worse with any ambulation, imaging positive for hip fracture denies fever chills chest pain or shortness of breath\"  2025: Left Hip Hemiarthroplasty due to left femoral neck fracture  Current Consultations:

## 2025-02-12 NOTE — PROGRESS NOTES
V2.0    [facility] [place]      Name:  Ran Espinoza /Age/Sex: 1931  (89 y.o. male)   MRN & CSN:  [id number] & [id number] Encounter Date/Time: 2025 9:24 AM EST   Location:  [id number] PCP: Wiliam Mahmood MD     Attending:Adonis Mcdowell MD       Hospital Day: 7    Assessment and Recommendations   Ran Espinoza is a 89 y.o. male who presents with Closed fracture of left hip (HCC)      Plan:  Left hip fracture, mechanical, pain management, status post hemiarthroplasty on  Ortho continue to follow-up.  Patient more confused today  Anemia, part of it acute blood loss anemia hemoglobin dropped to 8.9 yesterday  Acute respiratory failure with hypoxia multifactorial postop, keep saturation above 90%  Heart murmur with aortic stenosis, cardiology consulted  Elevated troponin, flat, chronically elevated was 61 on  and 72 on 2024  Generalized weakness PT OT.  Acute metabolic encephalopathy, mild hypoactive delirium supportive measures expect improvement during the day        Diet ADULT DIET; Dysphagia - Soft and Bite Sized; Mildly Thick ([place])  ADULT ORAL NUTRITION SUPPLEMENT; Dinner, Lunch; Frozen Oral Supplement   DVT Prophylaxis [] Lovenox, []  Heparin, [] SCDs, [] Ambulation,  [] Eliquis, [] Xarelto  [] Coumadin   Code Status DNR-CC             Personally reviewed Lab Studies and Imaging     Discussed management of the case with case management over IDR who recommended PT OT    Rhythm strip independently interpreted by myself heart rate 71 sinus rhythm QT 0 42        Medical Decision Making:  The following items were considered in medical decision making:  Discussion of patient care with other providers  Reviewed clinical lab tests  Reviewed radiology tests  Reviewed other diagnostic tests/interventions  Independent review of radiologic images  Microbiology cultures and other micro tests reviewed      Subjective:     Chief Complaint: Hip pain    Ran LAINEZ   Tricuspid Valve: Moderate regurgitation.   Aorta: Normal sized ascending aorta. Mildly dilated aortic root. Ao root diameter is 3.9 cm.     CT HIP LEFT WO CONTRAST    Result Date: 2/6/2025  EXAMINATION: CT OF THE LEFT HIP WITHOUT CONTRAST 2/6/2025 7:46 am TECHNIQUE: CT of the left hip was performed without the administration of intravenous contrast.  Multiplanar reformatted images are provided for review. Automated exposure control, iterative reconstruction, and/or weight based adjustment of the mA/kV was utilized to reduce the radiation dose to as low as reasonably achievable. COMPARISON: None. HISTORY ORDERING SYSTEM PROVIDED HISTORY: fall TECHNOLOGIST PROVIDED HISTORY: Reason for exam:->fall Decision Support Exception - unselect if not a suspected or confirmed emergency medical condition->Emergency Medical Condition (MA) Reason for Exam: fell off chair this morning-hit back of head, Left hip pain FINDINGS: Bones: There is comminuted impacted the remainder the visualized osseous structures are intact.  Oblique fracture extending through the subcapital region of the left hip. Soft Tissue: No significant soft tissue edema or fluid collections. Joint: There are moderate degenerative changes manifested by loss of joint space and marginal osteophyte formation.     Subcapital left hip fracture     CT C-Spine W/O Contrast    Result Date: 2/6/2025  EXAMINATION: CT OF THE CERVICAL SPINE WITHOUT CONTRAST 2/6/2025 7:46 am TECHNIQUE: CT of the cervical spine was performed without the administration of intravenous contrast. Multiplanar reformatted images are provided for review. Automated exposure control, iterative reconstruction, and/or weight based adjustment of the mA/kV was utilized to reduce the radiation dose to as low as reasonably achievable. COMPARISON: None. HISTORY: ORDERING SYSTEM PROVIDED HISTORY: fall TECHNOLOGIST PROVIDED HISTORY: Reason for exam:->fall Decision Support Exception - unselect if not a suspected

## 2025-02-12 NOTE — PROGRESS NOTES
Approach). PMH as noted including Mitral Regur, Lymphoma, Hernia/Repair, R Ankle Surg.  PTA pt living alone (wife at [facility]) in multi level home with ramp access and 1st floor bed/bath; reports independent daily care and functional mobility (with Rollator).  Pr currently requiring Max/depend assist x 2 for bed mob with care and repositioning.  At this time, would recommend cont PT (3-5) upon d/c.  Will cont to monitor pt's progress.  Requires PT Follow-Up: Yes  Activity Tolerance  Activity Tolerance: Patient tolerated treatment well    Plan  Physical Therapy Plan  General Plan: 3-5 times per week  Current Treatment Recommendations: Strengthening, Therapeutic activities, Functional mobility training, Transfer training, Gait training, Safety education & training, Patient/Caregiver education & training, ADL/Self-care training, Positioning, Modalities  Safety Devices  Type of Devices: Call light within reach, Nurse notified, Patient at risk for falls, All fall risk precautions in place, Left in chair, Chair alarm in place, Gait belt    Restrictions  Restrictions/Precautions  Restrictions/Precautions: Fall Risk, Weight Bearing  Lower Extremity Weight Bearing Restrictions  Left Lower Extremity Weight Bearing: Weight Bearing As Tolerated  Position Activity Restriction  Hip Precautions: Anterior hip precautions  Other Position/Activity Restrictions: removed Purewick. RA     Subjective  General  Chart Reviewed: Yes  Patient assessed for rehabilitation services?: Yes  Additional Pertinent Hx: 89 y/o male admit 2/6/2025 with L Hip Fx following fall.  CT Head/C-Spine : negative.  2/7/2025 S/P L Hip Hemiarthroplasty (Dr Lemons; [facility]).  PMH as noted including Mitral Regur, Lymphoma, Hernia/Repair, R Ankle Surg.  Response To Previous Treatment: Patient with no complaints from previous session.  Family/Caregiver Present: Yes  Follows Commands: Within Functional Limits  Subjective  Subjective: to    Balance  Comments: midline in sitting and static stance on the walker in a very kyphotic posture     -dynamic is poor while using rolling walker and min  assist       Goals  Short Term Goals  Time Frame for Short Term Goals: 1-2 days  Short Term Goal 1: Bed Mob Mod assist x 2.  Short Term Goal 2: Transfers via Stedy Min/Mod assist x 2.  Short Term Goal 3: Atttempt Transfers/Amb with assist device as approp.  Short Term Goal 4: Pt participating in approp Strength Exs.  Patient Goals   Patient Goals : want to get well to go home again           Therapy Time   Individual Concurrent Group Co-treatment   Time In 1500         Time Out 1538         Minutes 38                 ASHTYN RASMUSSEN, PT

## 2025-02-12 NOTE — PLAN OF CARE
Problem: Discharge Planning  Goal: Discharge to home or other facility with appropriate resources  Outcome: Progressing  Flowsheets (Taken 2/11/2025 2348)  Discharge to home or other facility with appropriate resources:   Identify barriers to discharge with patient and caregiver   Identify discharge learning needs (meds, wound care, etc)     Problem: Pain  Goal: Verbalizes/displays adequate comfort level or baseline comfort level  Outcome: Progressing  Flowsheets (Taken 2/11/2025 2348)  Verbalizes/displays adequate comfort level or baseline comfort level:   Encourage patient to monitor pain and request assistance   Assess pain using appropriate pain scale   Administer analgesics based on type and severity of pain and evaluate response     Problem: Safety - Adult  Goal: Free from fall injury  Outcome: Progressing  Flowsheets (Taken 2/11/2025 2348)  Free From Fall Injury: Instruct family/caregiver on patient safety     Problem: ABCDS Injury Assessment  Goal: Absence of physical injury  Outcome: Progressing  Flowsheets (Taken 2/11/2025 2348)  Absence of Physical Injury: Implement safety measures based on patient assessment     Problem: Skin/Tissue Integrity  Goal: Skin integrity remains intact  Description: 1.  Monitor for areas of redness and/or skin breakdown  2.  Assess vascular access sites hourly  3.  Every 4-6 hours minimum:  Change oxygen saturation probe site  4.  Every 4-6 hours:  If on nasal continuous positive airway pressure, respiratory therapy assess nares and determine need for appliance change or resting period  Outcome: Progressing  Flowsheets (Taken 2/11/2025 2348)  Skin Integrity Remains Intact: Monitor for areas of redness and/or skin breakdown     Problem: Cardiovascular - Adult  Goal: Maintains optimal cardiac output and hemodynamic stability  Outcome: Progressing  Flowsheets (Taken 2/11/2025 2348)  Maintains optimal cardiac output and hemodynamic stability: Monitor blood pressure and heart

## 2025-02-13 VITALS
BODY MASS INDEX: 22.6 KG/M2 | DIASTOLIC BLOOD PRESSURE: 80 MMHG | HEIGHT: 67 IN | SYSTOLIC BLOOD PRESSURE: 138 MMHG | RESPIRATION RATE: 17 BRPM | TEMPERATURE: 97.3 F | HEART RATE: 67 BPM | WEIGHT: 143.96 LBS | OXYGEN SATURATION: 94 %

## 2025-02-13 PROBLEM — I50.9 CONGESTIVE HEART FAILURE (HCC): Status: ACTIVE | Noted: 2025-02-13

## 2025-02-13 PROBLEM — I35.0 NONRHEUMATIC AORTIC VALVE STENOSIS: Status: ACTIVE | Noted: 2025-02-13

## 2025-02-13 PROCEDURE — 6370000000 HC RX 637 (ALT 250 FOR IP): Performed by: ORTHOPAEDIC SURGERY

## 2025-02-13 PROCEDURE — 9990000010 HC NO CHARGE VISIT

## 2025-02-13 PROCEDURE — 6370000000 HC RX 637 (ALT 250 FOR IP): Performed by: INTERNAL MEDICINE

## 2025-02-13 PROCEDURE — 97530 THERAPEUTIC ACTIVITIES: CPT

## 2025-02-13 PROCEDURE — 6360000002 HC RX W HCPCS: Performed by: NURSE PRACTITIONER

## 2025-02-13 PROCEDURE — 94760 N-INVAS EAR/PLS OXIMETRY 1: CPT

## 2025-02-13 RX ORDER — TORSEMIDE 10 MG/1
10 TABLET ORAL
Qty: 8 TABLET | Refills: 0
Start: 2025-02-17

## 2025-02-13 RX ADMIN — TORSEMIDE 10 MG: 20 TABLET ORAL at 08:39

## 2025-02-13 RX ADMIN — ACETAMINOPHEN 1000 MG: 500 TABLET ORAL at 08:39

## 2025-02-13 RX ADMIN — ASPIRIN 81 MG CHEWABLE TABLET 81 MG: 81 TABLET CHEWABLE at 08:49

## 2025-02-13 RX ADMIN — OXYCODONE HYDROCHLORIDE AND ACETAMINOPHEN 500 MG: 500 TABLET ORAL at 08:39

## 2025-02-13 RX ADMIN — FERROUS SULFATE TAB 325 MG (65 MG ELEMENTAL FE) 325 MG: 325 (65 FE) TAB at 08:39

## 2025-02-13 RX ADMIN — ENOXAPARIN SODIUM 40 MG: 100 INJECTION SUBCUTANEOUS at 08:39

## 2025-02-13 ASSESSMENT — PAIN SCALES - GENERAL
PAINLEVEL_OUTOF10: 0
PAINLEVEL_OUTOF10: 0

## 2025-02-13 NOTE — PROGRESS NOTES
Hospital Medicine Discharge Summary    Patient ID: Ran Espinoza      Patient's PCP: Wiliam Mahmood MD    Admit Date: 2/6/2025     Discharge Date:   02/13/2025    Admitting Provider: Adonis Mcdowell MD     Discharge Provider: Adonis Mcdowell MD     Discharge Diagnoses:       Active Hospital Problems    Diagnosis     Closed fracture of left hip (HCC) [S72.002A]        The patient was seen and examined on day of discharge and this discharge summary is in conjunction with any daily progress note from day of discharge.    Hospital Course:       From HPI:\"89 y.o. male who presented to [facility] with hip pain, had a mechanical fall at home of 2 he was falling asleep patient presented to ED initially with left hip pain 7 out of 10 intensity worse with any ambulation, imaging positive for hip fracture denies fever chills chest pain or shortness of breath.  Discussed with ED MD via [employer] agree with plan to admit \"      Left hip fracture, mechanical, pain management, status post hemiarthroplasty on February 7 overall stable course with Ortho the day of discharge  Anemia, part of it acute blood loss anemia hemoglobin 8.8 yesterday.  Has any chest pain or shortness of breath at rest  Acute respiratory failure with hypoxia multifactorial postop, keep saturation above 90% currently on room air  Heart murmur with aortic stenosis, cardiology consulted discussed with cardiology as outpatient  Elevated troponin, flat, chronically elevated was 61 on December 23 and 72 on December 23, 2024  Generalized weakness PT OT.  Acute metabolic encephalopathy, fluctuating confusion likely hypoactive delirium significantly improved daughter at bedside          Physical Exam Performed:     /80   Pulse 67   Temp 97.3 °F (36.3 °C) (Oral)   Resp 17   Ht 1.702 m (5' 7\")   Wt 65.3 kg (143 lb 15.4 oz)   SpO2 96%   BMI 22.55 kg/m²       General appearance:  No apparent distress  HEENT:  Normal cephalic  Respiratory:   Stable    Discharge Instructions/Follow-up: PCP cardiology Ortho    Code Status:  DNR-CC     Activity: activity as tolerated    Diet: Regular      Discharge Medications:     Current Discharge Medication List             Details   torsemide (DEMADEX) 10 MG tablet Take 1 tablet by mouth Twice a Week  Qty: 8 tablet, Refills: 0                Details   aspirin 81 MG tablet Take 1 tablet by mouth in the morning and at bedtime  Qty: 60 tablet, Refills: 0                Details   ferrous sulfate (IRON 325) 325 (65 Fe) MG tablet Take 1 tablet by mouth 2 times daily  Qty: 60 tablet, Refills: 0      Ascorbic Acid (VITAMIN C) 500 MG tablet Take 1 tablet by mouth daily      Vitamin D (CHOLECALCIFEROL) 1000 UNITS CAPS capsule Take 1 capsule by mouth daily      Multiple Vitamins-Minerals (MULTI COMPLETE PO) Take 1 tablet by mouth daily.             Time Spent on discharge: 33 minutes in the examination, evaluation, counseling and review of medications and discharge plan.      Signed:    Adonis Mcdowell MD   2/13/2025      Thank you Wiliam Mahmood MD for the opportunity to be involved in this patient's care. If you have any questions or concerns, please feel free to contact me at [phone].    Comment: Please note this report has been produced using speech recognition software and may contain errors related to that system including errors in grammar, punctuation, and spelling, as well as words and phrases that may be inappropriate. If there are any questions or concerns, please feel free to contact the dictating provider for clarification.

## 2025-02-13 NOTE — PLAN OF CARE
Problem: Discharge Planning  Goal: Discharge to home or other facility with appropriate resources  2/13/2025 1136 by Corbin Moreno RN  Outcome: Progressing  Flowsheets (Taken 2/11/2025 2348 by Sylvia Madera, RN)  Discharge to home or other facility with appropriate resources:   Identify barriers to discharge with patient and caregiver   Identify discharge learning needs (meds, wound care, etc)  2/12/2025 2254 by Angle Martinez RN  Outcome: Progressing  Flowsheets (Taken 2/11/2025 2348 by Sylvia Madera, RN)  Discharge to home or other facility with appropriate resources:   Identify barriers to discharge with patient and caregiver   Identify discharge learning needs (meds, wound care, etc)     Problem: Pain  Goal: Verbalizes/displays adequate comfort level or baseline comfort level  2/13/2025 1136 by Corbin Moreno RN  Outcome: Progressing  Flowsheets (Taken 2/11/2025 2348 by Sylvia Madera, RN)  Verbalizes/displays adequate comfort level or baseline comfort level:   Encourage patient to monitor pain and request assistance   Assess pain using appropriate pain scale   Administer analgesics based on type and severity of pain and evaluate response  2/12/2025 2254 by Angle Martinez RN  Outcome: Progressing  Flowsheets (Taken 2/11/2025 2348 by Sylvia Madera, RN)  Verbalizes/displays adequate comfort level or baseline comfort level:   Encourage patient to monitor pain and request assistance   Assess pain using appropriate pain scale   Administer analgesics based on type and severity of pain and evaluate response     Problem: Safety - Adult  Goal: Free from fall injury  2/13/2025 1136 by Corbin Moreno RN  Outcome: Progressing  Flowsheets (Taken 2/11/2025 2348 by Sylvia Madera, RN)  Free From Fall Injury: Instruct family/caregiver on patient safety  2/12/2025 2254 by Angle Martinez RN  Outcome: Progressing  Flowsheets (Taken 2/11/2025 2348 by Sylvia Madera, RN)  Free From Fall Injury:

## 2025-02-13 NOTE — PROGRESS NOTES
[facility]  Cardiology Consult Note        CC:      Pulmonary edema with hypoxia in the setting of severe aortic stenosis            HPI:   This is a 89 y.o. male admitted with hip fracture after mechanical fall.  Status post hemiarthroplasty.      The patient has had intermittent agitation and confusion.  He is having sinus tachycardia with heart rate in the 120-140.  Hemoglobin 8.2.  Chest x-ray showing pulmonary edema.  The patient treated with IV Lasix.  Vital signs stable.  Blood pressure 123/73 and a pulse of 73 oxygen saturations 98% on 4 L.      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    GERD (gastroesophageal reflux disease)     mild    Heart murmur     Wears glasses     reading      Past Surgical History:   Procedure Laterality Date    ANKLE SURGERY Right 1985    COLONOSCOPY  6/4/2009    ok, dr acosta    ENDOSCOPY, COLON, DIAGNOSTIC  06/04/2009    hiatal hernia dr acosta    HIP SURGERY Left 2/7/2025    LEFT HIP HEMIARTHROPLASTY ANTERIOR APPROACH performed by Alex Lemons MD at [facility] OR    INGUINAL HERNIA REPAIR N/A 10/13/2017    TONSILLECTOMY AND ADENOIDECTOMY        Family History   Problem Relation Age of Onset    Arthritis Mother     Heart Disease Father         pacemaker      Social History     Tobacco Use    Smoking status: Never    Smokeless tobacco: Never   Vaping Use    Vaping status: Never Used   Substance Use Topics    Alcohol use: Yes     Comment: minimal beer. Pt active plays basketball once a week    Drug use: No      Allergies   Allergen Reactions    Egg-Derived Products Other (See Comments)     headaches         Review of Systems -   Constitutional: Negative for weight gain/loss; malaise, fever  Respiratory: Negative for Asthma;  cough and hemoptysis  Cardiovascular: Negative for palpitations,dizziness

## 2025-02-13 NOTE — PROGRESS NOTES
Patient resting in bed quietly this evening, A&O to self only. Vital signs stable. PIV flushed without issue, dressing CDI, normal saline locked. All nightly medication taken crushed in applesauce without complaint (see eMAR for administration). Denies N/V, SOB, lightheadedness/dizziness. Purewick initiated at this time d/t patients incontinence. Dressing to L hip remains CDI, no signs of drainage, or odor. SSKIN bundle implemented. No additional needs verbalized at this time. Standard safety precautions in place and call light within reach. Will continue to monitor and assess.

## 2025-02-13 NOTE — CARE COORDINATION
02/13/25 1144   IMM Letter   IMM Letter given to Patient/Family/Significant other/Guardian/POA/by: given to patient and daughter Tiffany present in room  hs   IMM Letter date given: 02/13/25   IMM Letter time given: 1140     Education provided to patient, patient reported no questions and verbalized understanding. Patient aware of 4 hours allotted time to determine if they choose to pursue Medicare appeal process.

## 2025-02-13 NOTE — PROGRESS NOTES
Retail pharmacy has been notified that the patient is either going to a SNF, ARU, or other inpatient facility.     All prescriptions sent to the retail pharmacy for this patient will be kept on profile unless told otherwise.    02/13/25 11:17 AM

## 2025-02-13 NOTE — CARE COORDINATION
Aware of patient's discharge and preference for [facility] as his wife is there. Spoke with Amina at [facility]. She will check on bed availability. [facility] was other identified snf.   Spoke with patient and daughter Tiffany present at the hospital. They prefer [facility] at this time.   Spoke with Nazia at [facility]--they can accept patient today.    DISCHARGE SUMMARY     DATE OF DISCHARGE: 2/13/2025    DISCHARGE DESTINATION: skilled unit    FACILITY:   Discharging to Facility/ Agency   Name: [facility]  Address:  [address] [place] [address]   Phone:  [phone]  Fax:  [phone]      INSURANCE PRECERT OBTAINED: n/a    LARISA/GONZALEZ COMPLETED: yes      TRANSPORTATION:     Company Name:  [facility]     Time: 3pm      COMMENTS: Informed patient and daughter of transport time.  Nazia at [facility] informed.  Rn aware. Report number [phone].    Electronically signed by Jennifer Lemons, MSW, LISW, Case Management on 2/13/2025 at 12:19 PM  [place] [phone]

## 2025-02-13 NOTE — PROGRESS NOTES
[facility]   Progress Note      S/P :  SUBJECTIVE  in recliner. Daughter at bedside. . Pain is   described in left hip and with the intensity of mild. Pain is described as aching.       OBJECTIVE              Physical                      VITALS:  /80   Pulse 67   Temp 97.3 °F (36.3 °C) (Oral)   Resp 17   Ht 1.702 m (5' 7\")   Wt 65.3 kg (143 lb 15.4 oz)   SpO2 96%   BMI 22.55 kg/m²                     MUSCULOSKELETAL:  left foot NVI. Wiggles toes to command. Able to plantarflex and dorsiflex ankle Pedal pulses are palpable.                    NEUROLOGIC:                                  Sensory:  Touch:  Left Lower Extremity:  normal                                                 Surgical wound appears clean and dry left hip with Prineo dressing Ice pack on.     Data       CBC:   Lab Results   Component Value Date/Time    WBC 5.2 02/12/2025 09:48 AM    RBC 2.70 02/12/2025 09:48 AM    RBC 4.01 01/03/2017 01:52 PM    HGB 8.8 02/12/2025 09:48 AM    HCT 26.5 02/12/2025 09:48 AM    MCV 98.3 02/12/2025 09:48 AM    MCH 32.5 02/12/2025 09:48 AM    MCHC 33.1 02/12/2025 09:48 AM    RDW 17.7 02/12/2025 09:48 AM     02/12/2025 09:48 AM    MPV 6.9 02/12/2025 09:48 AM        WBC:    Lab Results   Component Value Date/Time    WBC 5.2 02/12/2025 09:48 AM        Hemoglobin/Hematocrit:    Lab Results   Component Value Date/Time    HGB 8.8 02/12/2025 09:48 AM    HCT 26.5 02/12/2025 09:48 AM        PT/INR:    Lab Results   Component Value Date/Time    PROTIME 13.6 02/06/2025 08:28 AM    INR 1.02 02/06/2025 08:28 AM              Current Inpatient Medications             Current Facility-Administered Medications: torsemide (DEMADEX) tablet 10 mg, 10 mg, Oral, Once per day on Monday Thursday  cyclobenzaprine (FLEXERIL) tablet 10 mg, 10 mg, Oral, TID PRN  acetaminophen (TYLENOL) tablet 1,000 mg, 1,000 mg, Oral, 3 times per day  ascorbic acid (VITAMIN C) tablet 500 mg, 500 mg, Oral, Daily  aspirin chewable

## 2025-02-13 NOTE — PROGRESS NOTES
Male Purewick removed by patient. Patient asking to use urinal. Urinal provided and patient was able to urinate 200mL of clear, [place] urine.

## 2025-02-13 NOTE — PROGRESS NOTES
Physical Therapy    Facility/Department: [address] ORTHOPEDICS    Physical Therapy Treatment note         Name: Ran Espinoza    : 1931    MRN: [id number]    Date of Service: 2025    Assessment / Discharge Recommendations:    -left hip fracture   ORIF by hemiarthroplasty  anterior precautions   wbat     -engages well for PT sessions   -plans discharge today to SNF for continued PT OT and nursing care       Ran Espinoza scored a  on the AM-PAC short mobility form.   Current research shows that an AM-PAC score of 17 or less is typically not associated with a discharge to the patient's home setting.   Based on the patient's AM-PAC score and their current functional mobility deficits, it is recommended that the patient have 3-5 sessions per week of Physical Therapy at d/c to increase the patient's independence.        Patient Diagnosis(es): The primary encounter diagnosis was Closed fracture of left hip, initial encounter ([facility]). A diagnosis of Nonrheumatic aortic valve stenosis was also pertinent to this visit.  Past Medical History:  has a past medical history of Anemia, Cancer (HCC), GERD (gastroesophageal reflux disease), Heart murmur, and Wears glasses.  Past Surgical History:  has a past surgical history that includes Ankle surgery (Right, ); Colonoscopy (2009); Endoscopy, colon, diagnostic (2009); Tonsillectomy and adenoidectomy; Inguinal hernia repair (N/A, 10/13/2017); and hip surgery (Left, 2025).      Body Structures, Functions, Activity Limitations Requiring Skilled Therapeutic Intervention: Decreased functional mobility ;Decreased strength;Decreased safe awareness;Decreased endurance;Decreased balance;Increased pain;Decreased ADL status;Decreased posture  Requires PT Follow-Up: Yes  Activity Tolerance  Activity Tolerance: Patient tolerated treatment well    Plan  Physical Therapy Plan  General Plan: 3-5 times per week  Current Treatment Recommendations:

## 2025-02-13 NOTE — PLAN OF CARE
Problem: Discharge Planning  Goal: Discharge to home or other facility with appropriate resources  2/12/2025 2254 by Angle Martinez RN  Outcome: Progressing  Flowsheets (Taken 2/11/2025 2348 by Sylvia Madera, RN)  Discharge to home or other facility with appropriate resources:   Identify barriers to discharge with patient and caregiver   Identify discharge learning needs (meds, wound care, etc)  2/12/2025 1146 by Corbin Moreno RN  Outcome: Progressing  Flowsheets (Taken 2/11/2025 2348 by Sylvia Madera, RN)  Discharge to home or other facility with appropriate resources:   Identify barriers to discharge with patient and caregiver   Identify discharge learning needs (meds, wound care, etc)     Problem: Pain  Goal: Verbalizes/displays adequate comfort level or baseline comfort level  2/12/2025 2254 by Angle Martinez RN  Outcome: Progressing  Flowsheets (Taken 2/11/2025 2348 by Sylvia Madera, RN)  Verbalizes/displays adequate comfort level or baseline comfort level:   Encourage patient to monitor pain and request assistance   Assess pain using appropriate pain scale   Administer analgesics based on type and severity of pain and evaluate response  2/12/2025 1146 by Corbin Moreno RN  Outcome: Progressing  Flowsheets (Taken 2/11/2025 2348 by Sylvia Madera, RN)  Verbalizes/displays adequate comfort level or baseline comfort level:   Encourage patient to monitor pain and request assistance   Assess pain using appropriate pain scale   Administer analgesics based on type and severity of pain and evaluate response     Problem: Safety - Adult  Goal: Free from fall injury  2/12/2025 2254 by Angle Martinez RN  Outcome: Progressing  Flowsheets (Taken 2/11/2025 2348 by Sylvia Madera, RN)  Free From Fall Injury: Instruct family/caregiver on patient safety  2/12/2025 1146 by Corbin Moreno RN  Outcome: Progressing  Flowsheets (Taken 2/11/2025 2348 by Sylvia Madera RN)  Free From Fall Injury:

## 2025-02-13 NOTE — PROGRESS NOTES
Occupational Therapy  Ran Espinoza  [id number]  [id number]    Therapist to pt's room to assist PT with tx recliner > bed via STEDY. Assist to don brief from bed via bridging with assist. Transport arriving to > pt to SNF. RN aware. Pt in bed with dtr at bedside upon therapist's exit.     Please refer to last full OT note for discharge recommendations/POC.     Arpita Washington, OTR/L [phone]

## 2025-02-13 NOTE — PROGRESS NOTES
Patient resting in bed this morning with no complaints of pain.  Scheduled morning medications administered per orders. See eMAR for documentation of medication administration. Patient tolerated medications whole w/ water w/o complication. IV to the L forearm flushed w/o complication and is saline locked at this time. Alcohol cap in place.     Head to toe assessment completed and charted. See flowsheets for documentation.     Patient updated with plan of care for the shift, denies questions. Patient denies further physical/emotional needs at this time. Call light, telephone, and bed side table are within reach. Fall precautions in place. Will continue to monitor and assess.

## 2025-02-14 ENCOUNTER — CARE COORDINATION (OUTPATIENT)
Dept: CARE COORDINATION | Age: 89
End: 2025-02-14

## 2025-02-14 NOTE — CARE COORDINATION
Care Transitions Post-Acute Facility Discharge Call    2025    Patient: Ran Espinoza Patient : 1931   MRN: [id number]  Reason for Admission: fall, closed fracture left hip, left hip stephanie ([facility])  Discharge Date: 25 RARS: Readmission Risk Score: 16.3       Post Acute Facility Update    Care Transitions Post Acute Facility Update    Care Transitions Interventions      Post Acute Facility Update   Post Acute Facility: [facility]    ELOS: 16 days      Nursing   Prescribed diet: pureed w/ nectar thick liquids    Wounds: Pos   Wound Location: see below   Reported Nursing Updates: Left hip ilial crest- incision, right lateral ankle- open area, sacrum- red/ blanchable. Cleanse Right outer ankle with NS pat dry, apply xeroform, cover with dry dressing every night shift. monitor surgical incision to left hip every shift for s/s of infection every day and night shift.   DNRCC. PT/ OT/ ST eval and treat               Rehab/Functional       SW/Discharge Planning              Next IDT Planned Review: 2025    Future Appointments   Date Time Provider Department Center   2025 10:45 AM Alex Lemons MD [facility]       SN Notes:   Diet: - Oral Diet:  Dysphagia - Pureed and nectar thick liquids   Wounds: right and foot open spot  Medications: Other: med rec complete  Other:    Post-acute CC Notes: PCC for admission     SALO WONG RN

## 2025-02-17 NOTE — TELEPHONE ENCOUNTER
Kelsy, can you schedule pt to see Dr. Sanches at [facility] on 3/20/25 in a TAVR spot as a new pt per Dr. Alba for aortic stenosis? If he wants to get in sooner, offer Dr. Hernandez at [facility] on 3/6/25 at 10:15am. THanks, Jimmy MEDINA

## 2025-02-18 NOTE — PROGRESS NOTES
Cardiology progress note    Mr. Salinas has severe aortic stenosis  The CHF that he developed was most likely due to severe aortic stenosis  This was treated with IV Lasix with improvement in his symptoms and resolution of heart failure.    SONY Alba M.D.

## 2025-02-18 NOTE — PROGRESS NOTES
Physician Progress Note      PATIENT:               STACY ANSARI  CSN #:                  [id number]  :                       1931  ADMIT DATE:       2025 7:20 AM  DISCH DATE:        2025 3:49 PM  RESPONDING  PROVIDER #:        Telly Alba MD          QUERY TEXT:    Cardiology,    Pt admitted with left hip fx s/p hemiarthroplasty and has pulmonary edema/CHF   documented. If possible, please document in progress notes and discharge   summary further specificity regarding the type and acuity of CHF:    The medical record reflects the following:  Risk Factors: aortic stenosis  Clinical Indicators: Cardiology documents: \"severe aortic stenosis, normal   ejection fraction of 60% on the recent echo done 2 days ago presents with   marked shortness of breath. proBNP 4620 Suggest CHF due to aortic stenosis  Treatment: labs, imaging, EKG, ECHO, IV lasix    Thank you  Options provided:  -- Acute Systolic CHF/HFrEF  -- Acute Diastolic CHF/HFpEF  -- Acute Systolic and Diastolic CHF  -- Other - I will add my own diagnosis  -- Disagree - Not applicable / Not valid  -- Disagree - Clinically unable to determine / Unknown  -- Refer to Clinical Documentation Reviewer    PROVIDER RESPONSE TEXT:    This patient is in acute diastolic CHF/HFpEF.    Query created by: Bertha Gagnon on 2/10/2025 2:16 PM      Electronically signed by:  Telly Alba MD 2025 10:35 AM

## 2025-02-22 ENCOUNTER — APPOINTMENT (OUTPATIENT)
Dept: GENERAL RADIOLOGY | Age: 89
End: 2025-02-22
Payer: MEDICARE

## 2025-02-22 ENCOUNTER — APPOINTMENT (OUTPATIENT)
Dept: CT IMAGING | Age: 89
End: 2025-02-22
Payer: MEDICARE

## 2025-02-22 ENCOUNTER — HOSPITAL ENCOUNTER (EMERGENCY)
Age: 89
Discharge: HOME | End: 2025-02-22
Payer: MEDICARE

## 2025-02-22 VITALS
HEIGHT: 67 IN | DIASTOLIC BLOOD PRESSURE: 74 MMHG | SYSTOLIC BLOOD PRESSURE: 143 MMHG | OXYGEN SATURATION: 95 % | RESPIRATION RATE: 16 BRPM | HEART RATE: 73 BPM | BODY MASS INDEX: 22.91 KG/M2 | TEMPERATURE: 97.5 F | WEIGHT: 145.94 LBS

## 2025-02-22 DIAGNOSIS — M25.512 ACUTE PAIN OF LEFT SHOULDER: ICD-10-CM
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
## 2025-02-22 DIAGNOSIS — R07.81 RIB PAIN ON LEFT SIDE: ICD-10-CM

## 2025-02-22 PROCEDURE — 71250 CT THORAX DX C-: CPT

## 2025-02-22 PROCEDURE — 73502 X-RAY EXAM HIP UNI 2-3 VIEWS: CPT

## 2025-02-22 PROCEDURE — 99284 EMERGENCY DEPT VISIT MOD MDM: CPT

## 2025-02-22 PROCEDURE — 73030 X-RAY EXAM OF SHOULDER: CPT

## 2025-02-22 PROCEDURE — 6370000000 HC RX 637 (ALT 250 FOR IP): Performed by: PHYSICIAN ASSISTANT

## 2025-02-22 RX ORDER — ACETAMINOPHEN 325 MG/1
650 TABLET ORAL ONCE
Status: COMPLETED | OUTPATIENT
Start: 2025-02-22 | End: 2025-02-22

## 2025-02-22 RX ADMIN — ACETAMINOPHEN 650 MG: 325 TABLET ORAL at 09:10

## 2025-02-22 ASSESSMENT — PAIN - FUNCTIONAL ASSESSMENT: PAIN_FUNCTIONAL_ASSESSMENT: 0-10

## 2025-02-22 ASSESSMENT — PAIN SCALES - GENERAL
PAINLEVEL_OUTOF10: 10
PAINLEVEL_OUTOF10: 10

## 2025-02-22 ASSESSMENT — PAIN DESCRIPTION - LOCATION: LOCATION: RIB CAGE

## 2025-02-22 NOTE — ED PROVIDER NOTES
[facility] EMERGENCY DEPARTMENT  EMERGENCY DEPARTMENT ENCOUNTER        Pt Name: Ran Espinoza  MRN: [id number]  Birthdate 4/12/1931  Date of evaluation: 2/22/2025  Provider: LAKIA Mcintyre  PCP: Wiliam Mahmood MD  Note Started: 8:18 AM EST 2/22/25      CASSY. I have evaluated this patient.        CHIEF COMPLAINT       Chief Complaint   Patient presents with    Fall     Pt to ed via ems from [facility] c/o fall. Pt c/o left shoulder and left rib pain.        HISTORY OF PRESENT ILLNESS: 1 or more Elements     History From: Patient  Limitations to history : None    Ran Espinoza is a 89 y.o. male who presents to the emergency room due to a fall that occurred around 7:00 this morning.  Patient is at care facility states that he was walking to the bathroom when he slipped falling onto his left side states that he has some mild pain in the left shoulder more pain in the left side of ribs.  Patient recently had hip surgery earlier this month after he had a fall.  He denies any hip pain numbness ting or loss sensation down the extremities.  He denies head injury and is not on any anticoagulation medication.  He denies shortness of breath or difficulty breathing.    Nursing Notes were all reviewed and agreed with or any disagreements were addressed in the HPI.    REVIEW OF SYSTEMS :      Review of Systems   Musculoskeletal:         Left shoulder, left-sided rib pain       Positives and Pertinent negatives as per HPI.     SURGICAL HISTORY     Past Surgical History:   Procedure Laterality Date    ANKLE SURGERY Right 1985    COLONOSCOPY  6/4/2009    ok, dr acosta    ENDOSCOPY, COLON, DIAGNOSTIC  06/04/2009    hiatal hernia dr acosta    HIP SURGERY Left 2/7/2025    LEFT HIP HEMIARTHROPLASTY ANTERIOR APPROACH performed by Alex Lemons MD at [facility] OR    INGUINAL HERNIA REPAIR N/A 10/13/2017    TONSILLECTOMY AND ADENOIDECTOMY         CURRENTMEDICATIONS       Discharge Medication List as of 2/22/2025  on left side          DISPOSITION/PLAN     DISPOSITION Decision To Discharge 02/22/2025 10:56:26 AM   DISPOSITION CONDITION Stable           PATIENT REFERRED TO:  Wiliam Mahmood MD  [address]  [place] [address]  [phone]    Schedule an appointment as soon as possible for a visit in 1 week  [facility] Emergency Department  [address] [address]  [phone]    If symptoms worsen, As needed      DISCHARGE MEDICATIONS:  Discharge Medication List as of 2/22/2025 11:34 AM          DISCONTINUED MEDICATIONS:  Discharge Medication List as of 2/22/2025 11:34 AM                 (Please note that portions of this note were completed with a voice recognition program.  Efforts were made to edit the dictations but occasionally words are mis-transcribed.)    LAKIA Mcintyre (electronically signed)        Hiro Carter PA  02/22/25 0353

## 2025-02-22 NOTE — ED TRIAGE NOTES
Pt to ed via ems from [facility] c/o fall. Pt c/o left shoulder and left rib pain.

## 2025-02-24 ENCOUNTER — OFFICE VISIT (OUTPATIENT)
Dept: ORTHOPEDIC SURGERY | Age: 89
End: 2025-02-24

## 2025-02-24 ENCOUNTER — CARE COORDINATION (OUTPATIENT)
Dept: CARE COORDINATION | Age: 89
End: 2025-02-24

## 2025-02-24 DIAGNOSIS — Z87.81 S/P LEFT HIP FRACTURE: Primary | ICD-10-CM

## 2025-02-24 PROCEDURE — 99024 POSTOP FOLLOW-UP VISIT: CPT | Performed by: PHYSICIAN ASSISTANT

## 2025-02-24 NOTE — PROGRESS NOTES
Patient: Ran Espinoza                  : 1931   MRN: [id number]   Date of Visit: 25     Physician: Alex Lemons MD.     Reason for Visit: Status post Left Hemiarthroplasty    Subjective History of Present Illness:     Ran Espinoza is here for regularly scheduled follow-up s/p left HA. Reports they are doing well, occasional aches and pains are controlled with over the counter medications. They do not report fevers, chills or drainage from the incision site. He is currently in a facility.     Physical Examination??: ?   General: Patient is alert and oriented x 3 and appears comfortable.   Incision is well-approximated, no erythema, fluctuance   Fires quad, SILT to thigh     Radiographs: AP pelvis/AP/lateral hip views of operative hip with well-positioned \hip hemiarthroplasty. No evidence of fracture subluxation or dislocation.        Assessment and Plan?: The patient is progressing well approximately 2 weeks s/p HA     1. A thorough discussion was had with the patient concerning the ?postoperative course and the patient is in agreement with the plan.   2. They will continue to wean from pain medications and progress weight bearing    3. Discussed the need for antibiotics prior to dental procedures at least for 1 years after arthroplasty  4. Will see the patient in 4 weeks with repeat xrays at that time.    _______________________      Will BRIAN Coon

## 2025-02-25 NOTE — TELEPHONE ENCOUNTER
Spoke with Prosper, he is currently admitted to a Rehab facility for a broken hip.  He isn't sure when he will be getting out. He asked that I call back in a few weeks and see if he's been released.   I will pend a follow-up to call in March   no

## 2025-02-26 ENCOUNTER — CARE COORDINATION (OUTPATIENT)
Dept: CARE COORDINATION | Age: 89
End: 2025-02-26

## 2025-02-26 NOTE — TELEPHONE ENCOUNTER
Daughter Alicia Roberts called and asked that I call her mid-late March to discuss scheduling appt with AV or DCE per Dr. Alba - she stated that her mother is in an Assisted Living Facility and they are trying to get her father to do the same as he is not really able to take care of himself.  She isn't sure he will want to pursue this procedure, she will speak with him about it.  Her & her sister [place] of [place] and difficult to be with them.   Call her at [phone]

## 2025-02-27 NOTE — CARE COORDINATION
Care Transitions Post-Acute Facility Discharge Call    2025    Patient: Ran Espinoza Patient : 1931   MRN: [id number]  Reason for Admission: Admission - : fall, closed fracture left hip, left hip stephanie (Lemons); ER visit : fall, pain left shoulder and rib pain  Discharge Date: 25 RARS: Readmission Risk Score: 16.3       Post Acute Facility Update    Care Transitions Post Acute Facility Update    Care Transitions Interventions      Post Acute Facility Update   Post Acute Facility: [facility]    ELOS: 16 days      Nursing   Prescribed diet: pureed w/ nectar thick liquids    Wounds: Pos   Wound Location: stage III right lateral malleolus- 1 x 0.7 x 0.2 cm   Wound Care Therapies: - Cleanse Right outer ankle with NS pat dry, pack with Silver Alginate, cover with 2x2 island dressing Once a day and PRN   Disease specific clinical considerations: new CHF, HLD, 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 Multiple falls noted. ER  for fall, left shoulder, left rib pain  LOS charting  Current LOS:  13      ELOS:  14-16  Anticipated DOD: no date set  3DW admission?  no  PLOF: home alone   Goals of care:  get stronger  Wounds/ treatment/ stage: The wound measures 1cm length x 0.7cm width x 0.2cm depth; 0.55cm^2 area and 0.11cm^3 volume. There is Fat Layer (Subcutaneous Tissue) exposed. There is a medium amount of serous drainage noted. The wound margin is distinct with the outline attached to the wound base. There is large (%) pink granulation within the wound bed. There is a small (1-33%) amount of necrotic tissue within the wound bed including Adherent Slough.   The periwound skin appearance exhibited: Maceration.  Labs? 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 no  Does caregiver need any support/ have needs?   Anticipated DC dispo/

## 2025-03-03 NOTE — ED NOTES
This nurse off unit with another pt: call light on bed: staff called this nurse to inform pt fell out of bed: provider in room with charge nurse and staff as this nurse enters room. Pt sts he needed to use the restroom and shifted down to bed to get up.

## 2025-03-03 NOTE — PROGRESS NOTES
Pharmacy Medication Reconciliation Note     List of medications Ran Espinoza is currently taking is complete.    Source of information:   1. Medication list from [facility] in patients room     Notes regarding home medications:   1. Per patients medication list -- patient is currently taking ASA 81 mg BID x 30 days (2/13/25-3/15/25), ferrous sulfate 325 mg BID, a multivitamin QD, torsemide 10 mg only on Mondays and Thursdays, ascorbic acid 500 mg QD, and vitamin D 1000 units QD.     Александр Orellana, Pharmacy Intern  3/3/2025  6:46 PM

## 2025-03-03 NOTE — ED PROVIDER NOTES
Spontaneous  Best Verbal Response: Oriented  Best Motor Response: Obeys commands  [place] Coma Scale Score: 15    Heart Score      PECARN Last:       CIWA: CIWA Assessment  BP: 120/75  Pulse: 61  COW Score: No data recorded   CURB 65 Last:     PORT Score:     WELL Criteria:     PERC Score:     CURB 65:      PHYSICAL EXAM  1 or more Elements     ED Triage Vitals   BP Systolic BP Percentile Diastolic BP Percentile Temp Temp Source Pulse Respirations SpO2   03/03/25 1421 -- -- 03/03/25 1423 03/03/25 1423 03/03/25 1421 03/03/25 1421 03/03/25 1501   120/81   97.5 °F (36.4 °C) Oral 68 16 99 %      Height Weight - Scale         03/03/25 1423 03/03/25 1423         1.702 m (5' 7\") 64.6 kg (142 lb 6.7 oz)             Physical Exam  Vitals and nursing note reviewed.   Constitutional:       General: He is not in acute distress.     Appearance: Normal appearance. He is normal weight. He is not ill-appearing, toxic-appearing or diaphoretic.   HENT:      Head: Normocephalic and atraumatic.      Right Ear: External ear normal.      Left Ear: External ear normal.      Nose: Nose normal.      Mouth/Throat:      Mouth: Mucous membranes are moist.   Eyes:      General:         Right eye: No discharge.         Left eye: No discharge.      Extraocular Movements: Extraocular movements intact.   Cardiovascular:      Rate and Rhythm: Normal rate and regular rhythm.      Heart sounds: No murmur heard.     No friction rub. No gallop.   Pulmonary:      Effort: Pulmonary effort is normal. No respiratory distress.      Breath sounds: No wheezing, rhonchi or rales.   Chest:      Chest wall: No tenderness.   Abdominal:      Palpations: Abdomen is soft.      Tenderness: There is no abdominal tenderness. There is no right CVA tenderness, left CVA tenderness, guarding or rebound.   Musculoskeletal:         General: Tenderness and signs of injury present. No swelling or deformity. Normal range of motion.      Cervical back: Normal range of motion and  me and confirmed by radiology identifying an acute fracture extending through the left greater trochanter location.    Interpretation per the Radiologist below, if available at the time of this note:    CT HIP LEFT WO CONTRAST    Result Date: 3/3/2025  Periprosthetic fracture of the proximal femur.     XR HIP LEFT (2-3 VIEWS)    Result Date: 3/3/2025  Acute fracture extending through the left greater trochanter No dislocation     CTA ABDOMEN PELVIS W WO CONTRAST    Result Date: 3/3/2025  1. No extravasation of contrast identified, however, evaluation is significantly limited by pre-existing high attenuation material, presumably contrast, within the stomach and portions of the bowel, particularly the distal small bowel and the colon. 2. 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 The mesenteric and renal arteries are otherwise normally patent. 3. Large hiatal hernia containing majority of the stomach and a portion of the pancreas. 4. Supraumbilical ventral hernia containing fat and fluid.  The presence of fluid raise the question of incarceration. 5. Mild prostatomegaly. 6. New mild superior endplate compression fractures at L3 and L4 with no visible fracture lines/acute features.  There is also a new mild compression fracture at T12 with less than 25% height loss and no dorsal retropulsion. There is a visible fracture line at T12 with sclerosis adjacent to the fracture suggesting this is subacute. There is also a new moderate T10 compression fracture with no acute features. 7. Anasarca with diffuse subcutaneous edema.     CT CHEST W CONTRAST    Result Date: 3/3/2025  1. Moderate bilateral pleural effusions have increased in size when compared to the prior exam. 2. Patchy airspace disease in the right upper lobe has improved but not completely resolved. 3. Large hiatal hernia. 4. Comminuted fracture of the medial  informed by coworker that patient experienced a mechanical fall in room 6.  Staff was immediately at bedside and patient did not lose consciousness.  Patient denies head injury.  His left lower extremity is shortened and externally rotated. [LL]   1707 Patient's left lower extremity remains W/P/D with brisk cap refill <2 seconds and 2+ DP/PT pulses.  Patient's head is atraumatic.  No hemotympanum.  No midline CTL spine pain.  Patient remains awake, alert, oriented x 4.  He states \"I just do not want another broken hip\". [LL]   1849 Consulted Dr. Scruggs -orthopedist. Discussed patient's HPI, ED work-up, results, and treatment. Dr. Scruggs recommends CT scan of patient's left hip without contrast and admit to hospitalist service.     [LL]      ED Course User Index  [LL] Patrice Feliciano, APRN - CNP        Workup reveals:  CBC: No leukocytosis, anemia with an RBC of 2.31, H&H reduced at 7.7 23.3, MCV elevated at one 1.0, RDW elevated 18.6, lymphocytes absolute reduced at 0.2 and otherwise unremarkable  Metabolic panel: No electrolyte derangement, hyperglycemic at 108 mg/dL, BUN elevated 37, and GFR reduced at 56, protein reduced at 5.8, albumin reduced at 3.3, CO2, and AST elevated 45  Pro time INR: WNL@14.0 and BNP 1.06  APTT: WNL@27.1  Blood occult stool: Positive  Type and screen: ABO Rh O- and antibody screen negative  XR left hip: As interpreted by me and confirmed by radiology identifying acute fracture extending to the left greater trochanter.  Location  CT chest with contrast: As noted above identifying   \"    1. Moderate bilateral pleural effusions have increased in size when compared  to the prior exam.  2. Patchy airspace disease in the right upper lobe has improved but not  completely resolved.  3. Large hiatal hernia.  4. Comminuted fracture of the medial right clavicle is unchanged from the  prior exam.  5. Numerous thoracic compression fractures are similar to the prior exam and  are age

## 2025-03-03 NOTE — ED PROVIDER NOTES
Emergency Department Attending Provider Note  Location: [facility] EMERGENCY DEPARTMENT  3/3/2025   Note Started: 3:17 PM EST 3/3/25     THIS IS MY CASSY SUPERVISORY AND SHARED VISIT NOTE:    Patient Identification  Ran Espinoza is a 89 y.o. male      HPI:Ran Espinoza was evaluated in the Emergency Department for low hemoglobin on outpatient testing at his nursing home at [facility].  Patient was brought into the ER because his hemoglobin was 6.1.  Patient has a history of CHF, hypertension, hyperlipidemia, anemia and cancer.  Patient denies noticing any blood per rectum.  Patient does not take any blood thinners.  He denies any abdominal pain.  Denies any chest pain.  No documented fevers.    Although initial history and physical exam information was obtained by CASSY/NPP (who also dictated a record of this visit), I personally saw the patient and made/approved the management plan and take responsibility for the patient management.       PHYSICAL EXAM:     CONSTITUTIONAL: Alert, cooperative with exam, afebrile, chronically ill-appearing   HEAD: normocephalic, atraumatic   EYES: PERRL, EOMI, anicteric sclera   ENT: Moist mucous membranes, uvula midline   NECK: Supple, symmetric, trachea midline   LUNGS: Bilateral breath sounds, CTAB, no rales/ronchi/wheezes   CARDIOVASCULAR: RRR, normal S1/S2, no m/r/g, 2+ pulses throughout   ABDOMEN: Soft, non-tender, non-distended, +BS   NEUROLOGIC:  MAEx4, 5/5 strength throughout; fine touch sensation intact throughout; GCS 15   MUSCULOSKELETAL: No clubbing, cyanosis or edema   SKIN: No rash, pallor or wounds on exposed surfaces     Please see separate documentation for rectal exam performed by Patrice MEJIA    EKG Interpretation by myself  Sinus rhythm with sinus arrhythmia and a rate of 74, normal axis, normal intervals, poor R wave progression, when compared EKG from 2/8/2025 for progression now present    Patient seen and evaluated.  Relevant records  that required repair and unfortunately has repeat x-ray interpreted by myself shows that he does have a fracture through the greater trochanter of the left hip where he has the hardware from his previous surgery.  Orthopedic surgery was consulted for patient's left hip fracture.    Patient started on Protonix for GI bleed.  He is hemodynamically stable without any hypotension, tachycardia or tachypnea.  Tolerating room air.      Plan for admission to hospitalist team for further management of patient's anemia, GI bleed and left hip fracture.    CLINICAL IMPRESSION  1. Acute upper GI bleeding    2. Positive occult stool blood test    3. Acute blood loss anemia    4. Fall from standing, initial encounter    5. Skin tear of left forearm without complication, initial encounter    6. Closed fracture of left hip, initial encounter ([facility])    7. Goals of care, counseling/discussion      DISPOSITION Decision To Admit 03/03/2025 06:23:05 PM   DISPOSITION CONDITION Stable           I, Gerald Streeter MD, am the primary clinician of record.   I personally saw the patient and independently provided 35 minutes of non-concurrent critical care out of the total shared critical care time provided.    This chart was generated in part by using Dragon Dictation system and may contain errors related to that system including errors in grammar, punctuation, and spelling, as well as words and phrases that may be inappropriate. If there are any questions or concerns please feel free to contact the dictating provider for clarification.     Gerald Streeter MD   [employer]        Gerald Streeter MD  03/03/25 6513

## 2025-03-04 ENCOUNTER — CARE COORDINATION (OUTPATIENT)
Dept: CARE COORDINATION | Age: 89
End: 2025-03-04

## 2025-03-04 PROBLEM — K92.2 ACUTE UPPER GI BLEEDING: Status: ACTIVE | Noted: 2025-03-04

## 2025-03-04 NOTE — CARE COORDINATION
CM received a call from palliative care nurse re: assistance with long term care placement. Palliative care nurse provided 3 facilities per family request: [facility], [facility], [facility] ([place]   CM phoned daughter Tiffany ([phone]) to discuss plan. Tiffany did confirm the choices above and noted that is the order they would like the referrals made. Tiffany also noted that her mother is an AL resident at [facility]. She is also aware that this would be a private pay situation, as patient does not have a Medicaid product.   CM phoned [facility] to determine if they had a private pay long term bed available. The facility is checking and will call CM back.  CM spoke to [facility] and they have a semi private long term bad available.  Spoke to Tiffany re: semi vs private room options. Tiffany notes that if [facility] has either, they will take it. And if needed, they would take the semi private at [facility], but would like to see if the patient could get a room at [facility] first.  CM will continue to follow.    UPDATE   [facility] will have a long term bed for the patient at discharge. Family will need to speak to the facility re: financials/payment

## 2025-03-04 NOTE — ED NOTES
Pt urinated in bed, somehow he ripped off male external device. Pt cleanup up, put under warm blankets and tucked in.     1 unit of blood completed. Repeat hemoglobin post blood is stable.     Consent from Alicia (daughter) is in paperwork at bedside, along with his DRNCC paperwork from nursing home.    Pt keeps stating \"just let me die.\"

## 2025-03-04 NOTE — ED NOTES
ED TO INPATIENT SBAR HANDOFF    Patient Name: Ran Espinoza   Preferred Name: Prosper  : 1931  89 y.o.   Family/Caregiver Present: no   Code Status Order: DNR-CC  PO Status: NPO:Yes  Telemetry Order:   C-SSRS:    Sitter no   Restraints:     Sepsis Risk Score      Situation  Chief Complaint   Patient presents with    Abnormal labs- Hgb 6.1     Abnormal Labs (Hgh: low) Pt from [facility] via [facility] for low hgb of 6.1 on labs done today. Pt A&0x4. Hx CHF, HTN, anemia, cancer.     Brief Description of Patient's Condition:   Mental Status: disoriented and alert  Arrived from:[facility]  Imaging:   CT HIP LEFT WO CONTRAST   Final Result   Periprosthetic fracture of the proximal femur.         XR HIP LEFT (2-3 VIEWS)   Final Result   Acute fracture extending through the left greater trochanter      No dislocation         CT CHEST W CONTRAST   Final Result   1. Moderate bilateral pleural effusions have increased in size when compared   to the prior exam.   2. Patchy airspace disease in the right upper lobe has improved but not   completely resolved.   3. Large hiatal hernia.   4. Comminuted fracture of the medial right clavicle is unchanged from the   prior exam.   5. Numerous thoracic compression fractures are similar to the prior exam and   are age indeterminate.         CTA ABDOMEN PELVIS W WO CONTRAST   Final Result   1. No extravasation of contrast identified, however, evaluation is   significantly limited by pre-existing high attenuation material, presumably   contrast, within the stomach and portions of the bowel, particularly the   distal small bowel and the colon.   2. 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   The mesenteric and renal arteries are otherwise normally patent.   3. Large hiatal hernia containing majority of the stomach and a portion of   the pancreas.   4.           Active Wounds:    Active Mead's:    Active Feeding Tubes:      Administered Medications:   Medications   sodium chloride flush 0.9 % injection 5-40 mL (10 mLs IntraVENous Given 3/4/25 0832)   sodium chloride flush 0.9 % injection 5-40 mL (has no administration in time range)   0.9 % sodium chloride infusion (has no administration in time range)   potassium chloride (KLOR-CON M) extended release tablet 40 mEq (has no administration in time range)     Or   potassium bicarb-citric acid (EFFER-K) effervescent tablet 40 mEq (has no administration in time range)     Or   potassium chloride 10 mEq/100 mL IVPB (Peripheral Line) (has no administration in time range)   magnesium sulfate 2000 mg in 50 mL IVPB premix (has no administration in time range)   enoxaparin (LOVENOX) injection 40 mg (0 mg SubCUTAneous Held 3/4/25 0832)   ondansetron (ZOFRAN-ODT) disintegrating tablet 4 mg (has no administration in time range)     Or   ondansetron (ZOFRAN) injection 4 mg (has no administration in time range)   polyethylene glycol (GLYCOLAX) packet 17 g (has no administration in time range)   acetaminophen (TYLENOL) tablet 650 mg (has no administration in time range)     Or   acetaminophen (TYLENOL) suppository 650 mg (has no administration in time range)   0.9 % sodium chloride infusion (0 mL/hr IntraVENous Stopped 3/4/25 1000)   oxyCODONE-acetaminophen (PERCOCET) 5-325 MG per tablet 1 tablet (has no administration in time range)   morphine (PF) injection 2 mg (2 mg IntraVENous Given 3/4/25 1520)   LORazepam (ATIVAN) injection 1 mg (1 mg IntraVENous Given 3/4/25 1520)   pantoprazole (PROTONIX) injection 80 mg (80 mg IntraVENous Given 3/3/25 1600)   iopamidol (ISOVUE-370) 76 % injection 75 mL (75 mLs IntraVENous Given 3/3/25 1532)   fentaNYL (SUBLIMAZE) injection 50 mcg (50 mcg IntraVENous Given 3/3/25 1750)   neomycin-bacitracin-polymyxin (NEOSPORIN) ointment ( Topical Given 3/3/25 1754)     Last documented pain medication  administration: 1520  Pertinent or High Risk Medications/Drips: no   If Yes, please provide details:   Blood Product Administration: no  If Yes, please provide details:   Process Protocols/Bundles:     Recommendation  Incomplete STAT orders:   Overdue Medications:   Patient Belongings:    Additional Comments:   If any further questions, please call Sending RN at [phone]       Admitting Unit Notification  Name of person notified and time: 1537       Electronically signed by: Electronically signed by Eli Gonzalez RN on 3/4/2025 at 3:36 PM

## 2025-03-04 NOTE — ED NOTES
I notified Virginia Joiner the CASSY covering tonight about the pt's hemoglobin of 6.5, asking if she wanted to give blood, awaiting a response

## 2025-03-04 NOTE — CARE COORDINATION
Care Transitions Post-Acute Facility Discharge Call    3/4/2025    Patient: Ran Espinoza Patient : 1931   MRN: [id number]  Reason for Admission: Admission - : fall, closed fracture left hip, left hip stephanie (Cristo); ER visit : fall, pain left shoulder and rib pain   Discharge Date: 25 RARS: Readmission Risk Score: 22.7    Writer received notification of readmission. Enrollment ended. Will continue to follow for discharge disposition      Discharge Facility:    Care Transitions Post Acute Facility Transition    Post Acute Facility: [facility]  Post Acute Admit Date: 25  Post Acute Discharge Date: 3/3/25  ELOS: 16 days           Care Transitions Interventions         Future Appointments   Date Time Provider Department Center   3/24/2025 10:45 AM Alex Lemons MD [facility]       SALO WONG RN

## 2025-03-04 NOTE — PROGRESS NOTES
Patient arrived from ED to PCU this evening. RN unable to assess patient's orientation - patient has mumbled/unintelligible speech. No Tele orders - BP stable - RN unable to obtain other VS as patient is very agitated/combative - patient attempts to hit/punch RN frequently. Assessment completed. Patient with several skin tears/wounds; this RN as well as Charge RN unable to obtain pictures/measurements of wounds due to patient's combativeness. Wounds documented on LDA.     RN to administer PRN Ativan as well as PRN Morphine in hopes of calming patient/alleviating discomfort. Hospice/Palliative care involved.     Patient is currently lying in bed confused, continuing to grab at medical equipment and bed rails. Telecam active for patient's safety. Bed in lowest position, alarm on, side rails X3, call button within reach. Care ongoing. Electronically signed by Dorota Singh RN on 3/4/2025 at 5:20 PM

## 2025-03-04 NOTE — PLAN OF CARE
Problem: Safety - Medical Restraint  Goal: Remains free of injury from restraints (Restraint for Interference with Medical Device)  Description: INTERVENTIONS:  1. Determine that other, less restrictive measures have been tried or would not be effective before applying the restraint  2. Evaluate the patient's condition at the time of restraint application  3. Inform patient/family regarding the reason for restraint  4. Q2H: Monitor safety, psychosocial status, comfort, nutrition and hydration  Outcome: Progressing  Flowsheets  Taken 3/4/2025 0600 by Yumi Chacon RN  Remains free of injury from restraints (restraint for interference with medical device): Every 2 hours: Monitor safety, psychosocial status, comfort, nutrition and hydration  Taken 3/4/2025 0400 by Yumi Chacon RN  Remains free of injury from restraints (restraint for interference with medical device): Every 2 hours: Monitor safety, psychosocial status, comfort, nutrition and hydration     Problem: Chronic Conditions and Co-morbidities  Goal: Patient's chronic conditions and co-morbidity symptoms are monitored and maintained or improved  Outcome: Progressing     Problem: Discharge Planning  Goal: Discharge to home or other facility with appropriate resources  Outcome: Progressing     Problem: Pain  Goal: Verbalizes/displays adequate comfort level or baseline comfort level  Outcome: Progressing

## 2025-03-04 NOTE — CONSULTS
hip frx/surgical planning     FINDINGS:  Bones: Left total hip arthroplasty.  The periprosthetic fracture involves the  greater trochanter as well as the proximal femur.  The fracture fragments are  mildly displaced.  The femoral stem is not displaced.  Negative for  dislocation.  The acetabular component is unremarkable.     Pubic rami are intact.  No widening of the pubic symphysis.  Bridging  osteophytes left SI joint.  Sacrum is unremarkable in appearance.     Soft Tissue: Large amount of streak artifact.  Edema in the subcutaneous  tissues.  Large amount of contrast in the colon.  No gas in the soft tissues.     Joint: Arthroplasty     IMPRESSION:  Periprosthetic fracture of the proximal femur.  IMPRESSION/RECOMMENDATIONS:    Left femoral neck fx with hemiarthroplasty 2/7/25 per DR Pathak  Anemia. Post transfusion in ER this visit  Fall in ER  Left greater trochanter hip fx, minimally displaced. No plan for surgical intervention. NWB left leg  PT OT to see  SS for DC planning  Updated Dr Marleen Lemons.     Celina Gtz, CAMILLE - CNP  3/4/2025  2:07 PM

## 2025-03-04 NOTE — PROGRESS NOTES
Palliative care      I have spoken at length to both of his daughters about hospice, and care that can be provided at [facility] with hospice. They agree hospice is most appropriate care at this time and if possible would like him to go to [facility] to be near his wife.   Since he has been in restraints he is unable to go to inpatient hospice.   Plan is to dc restraints, start Ativan and morphine and to keep him comfortable and admit him overnight.   Discussed with Dr Interiano       Electronically signed by Glendy MELENDREZN, RN, CHPN on 3/4/2025 at 3:27 PM  Palliative Care Nurse  Phone [phone]

## 2025-03-04 NOTE — PROGRESS NOTES
[facility]    Spoke with  Tiffany patient's daughter  to discuss hospice philosophy and services.  Explained routine vs. continuous care, as well as support team services. Discussed covered vs noncovered equipment, services, and medications and patient/family right to request a review of requested/noncovered items. Time spent listening to events of illness, answering questions, and providing emotional support.Plan is to admit to hospital then hopefully he will be able to join his wife at [facility] in LTC with hospice services. Hospice nurse will assist with discharge planning as needed.    Myla Valentine  [phone]

## 2025-03-04 NOTE — ED NOTES
Pt put in hospital bed, pt's brief is clean, purewick still in place, pt on RA saturating 94%, 3 warm blankets applied. Bed in lowest position with bed alarm on.

## 2025-03-04 NOTE — H&P
V2.0  History and Physical      Name:  Ran Espinoza /Age/Sex: 1931  (89 y.o. male)   MRN & CSN:  [id number] & [id number] Encounter Date/Time: 3/3/2025 7:00 PM EST   Location:   PCP: Wiilam Mahmood MD       Hospital Day: 1  History from:   patient, electronic medical record  History of Present Illness:   Chief Complaint: Anemia    Ran Espinoza is a 89 y.o. 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  He was noted to have acute blood loss anemia at that time his hemoglobin was in the 8 range.  Respiratory failure was felt to be multifactorial and postoperative.  Seen by cardiology for concerns aortic stenosis.  He was also noted to have metabolic encephalopathy with fluctuating sensorium.  Sent from nursing facility, for concerns of anemia, he had outpatient testing done, his hemoglobin was noted to be 6.1 and hence was sent to the ED, where repeat CBC done showed hemoglobin to be more than 7, guaiac was positive, unfortunately patient tried to get up of the bed and fell, ended up with acute fracture involving the left greater trochanter.  CT angiogram done did not reveal any acute extravasation, there was concern for a new mild compression fracture at the L3-L4 level new moderate T10 compression fracture.  Evidence of fluid overload was noted.  Patient will be admitted for further evaluation management     Review of Systems:    Pertinent positives and negatives discussed in HPI   Objective:   No intake or output data in the 24 hours ending 25 1900   Vitals:   Vitals:    25 1750 25 1800 25 1815 25 1830   BP:   119/73 127/80   Pulse:   62 66   Resp: 14  20 15   Temp:       TempSrc:       SpO2:  92%     Weight:       Height:           Past Medical  Last Year: No     Medications Prior to Admission     Prior to Admission medications    Medication Sig Start Date End Date Taking? Authorizing Provider   torsemide (DEMADEX) 10 MG tablet Take 1 tablet by mouth Twice a Week  Patient taking differently: Take 1 tablet by mouth Twice a Week On Mondays and Thursdays 2/17/25   Adonis Mcdowell MD   aspirin 81 MG tablet Take 1 tablet by mouth in the morning and at bedtime Take twice a day for 30 days after hip surgery then can resume daily dosing.  Patient taking differently: Take 1 tablet by mouth 2 times daily End date is 3/15/25 2/13/25 3/15/25  Celina Gtz, APRN - CNP   ferrous sulfate (IRON 325) 325 (65 Fe) MG tablet Take 1 tablet by mouth 2 times daily 12/25/24   Jesika Interiano MD   Ascorbic Acid (VITAMIN C) 500 MG tablet Take 1 tablet by mouth daily    ProviderJovanna MD   Vitamin D (CHOLECALCIFEROL) 1000 UNITS CAPS capsule Take 1 capsule by mouth daily    ProviderJovanna MD   Multiple Vitamins-Minerals (MULTI COMPLETE PO) Take 1 tablet by mouth daily.    Provider, MD Jovanna       Physical Exam:      General: Awake, alert and oriented, NAD  Eyes: EOMI  ENT: neck supple  Cardiovascular: S1S2 present, regular rate and rhythm, no murmurs  Respiratory: Bibasilar crackles  Gastrointestinal: Soft, non tender, + bowel sounds   Genitourinary: no suprapubic tenderness  Musculoskeletal: 2+ peripheral edema    Medications:       Labs    CBC:   Recent Labs     03/03/25  1446   WBC 7.2   HGB 7.7*        BMP:    Recent Labs     03/03/25  1446      K 4.6      CO2 25   BUN 37*   CREATININE 1.2   GLUCOSE 108*     Hepatic:   Recent Labs     03/03/25  1446   AST 45*   ALT 16   BILITOT 0.6   ALKPHOS 162*     Lipids:   Lab Results   Component Value Date/Time    CHOL 209 10/01/2021 11:10 AM    HDL 66 10/01/2020 09:46 AM    TRIG 56 10/01/2020 09:46 AM     Hemoglobin A1C: No results found for: \"LABA1C\"  TSH:   Lab Results   Component

## 2025-03-04 NOTE — CONSULTS
Surgery Consult Note     Bernard Michael PA-C  Pt Name: Ran Espinoza  MRN: [id number]  YOB: 1931  Date of evaluation: 3/4/2025  Primary Care Physician: Wiliam Mahmood MD  Referred By: Abeba Pérez  Reason for Consultation: \"? Incarcerated hernia\"  Chief Complaint:***  IMPRESSIONS:   ***    PLANS:   ***  SUBJECTIVE:   History of Chief Complaint:    Ran Espinoza is a 89 y.o. male who presented with anemia.   Past Medical History  Reviewed  has a past medical history of Anemia, Cancer (HCC), GERD (gastroesophageal reflux disease), Heart murmur, and Wears glasses.  Past Surgical History  Reviewed has a past surgical history that includes Ankle surgery (Right, 1985); Colonoscopy (6/4/2009); Endoscopy, colon, diagnostic (06/04/2009); Tonsillectomy and adenoidectomy; Inguinal hernia repair (N/A, 10/13/2017); and hip surgery (Left, 2/7/2025).  Medications  Prior to Admission medications    Medication Sig Start Date End Date Taking? Authorizing Provider   torsemide (DEMADEX) 10 MG tablet Take 1 tablet by mouth Twice a Week  Patient taking differently: Take 1 tablet by mouth Twice a Week On Mondays and Thursdays 2/17/25   Adonis Mcdowell MD   aspirin 81 MG tablet Take 1 tablet by mouth in the morning and at bedtime Take twice a day for 30 days after hip surgery then can resume daily dosing.  Patient taking differently: Take 1 tablet by mouth 2 times daily End date is 3/15/25 2/13/25 3/15/25  Celina Gtz, APRN - CNP   ferrous sulfate (IRON 325) 325 (65 Fe) MG tablet Take 1 tablet by mouth 2 times daily 12/25/24   Jesika Interiano MD   Ascorbic Acid (VITAMIN C) 500 MG tablet Take 1 tablet by mouth daily    ProviderJovanna MD   Vitamin D (CHOLECALCIFEROL) 1000 UNITS CAPS capsule Take 1 capsule by mouth daily    Jovanna Pierre MD   Multiple Vitamins-Minerals (MULTI COMPLETE PO) Take 1 tablet by mouth daily.    Jovanna Pierre MD    Scheduled Meds:   sodium chloride flush   3/4/2025 at 9:47 AM

## 2025-03-04 NOTE — PROGRESS NOTES
4 Eyes Skin Assessment     NAME:  Ran Espinoza  YOB: 1931  MEDICAL RECORD NUMBER:  [id number]    The patient is being assessed for  Admission    I agree that at least one RN has performed a thorough Head to Toe Skin Assessment on the patient. ALL assessment sites listed below have been assessed.      Areas assessed by both nurses:    Head, Face, Ears, Shoulders, Back, Chest, Arms, Elbows, Hands, Sacrum. Buttock, Coccyx, Ischium, Legs. Feet and Heels, and Under Medical Devices         Does the Patient have a Wound? Yes wound(s) were present on assessment. LDA wound assessment was Initiated and completed by RN       Harjit Prevention initiated by RN: Yes  Wound Care Orders initiated by RN: Yes    Pressure Injury (Stage 3,4, Unstageable, DTI, NWPT, and Complex wounds) if present, place Wound referral order by RN under : Yes    New Ostomies, if present place, Ostomy referral order under : No     Nurse 1 eSignature: Electronically signed by Dorota Singh RN on 3/4/25 at 5:22 PM EST    **SHARE this note so that the co-signing nurse can place an eSignature**    Nurse 2 eSignature: Electronically signed by Maranda Malave RN on 3/4/25 at 6:41 PM EST

## 2025-03-04 NOTE — CONSULTS
GASTROENTEROLOGY INPATIENT CONSULTATION        IDENTIFYING DATA/REASON FOR CONSULTATION   PATIENT:  Ran Espinoza  MRN:  [id number]  ADMIT DATE: 3/3/2025  TIME OF EVALUATION: 3/4/2025 10:17 AM  HOSPITAL STAY:   LOS: 1 day     REASON FOR CONSULTATION: Acute blood loss anemia    HISTORY OF PRESENT ILLNESS   Ran Espinoza is a 89 y.o. male with a PMH of anemia, GERD, cancer on the back of the tongue, CHF, aortic valve stenosis, who presented on 3/3/2025 with abnormal lab results low hemoglobin.  We have been consulted regarding acute blood loss anemia.  Patient arrived to the ED via EMS from nursing facility due to low hemoglobin at 6.1.  Status post left hip surgery on 2/7/2025.  Patient denies hematochezia or melena nausea vomiting or abdominal pain.  He appears malnourished.  It was noted occult blood positive with no overt bleeding.  Patient had a mechanical fall in the ER and refractured the left hip.      Prior Endoscopic Evaluations: None    PAST MEDICAL, SURGICAL, FAMILY, and SOCIAL HISTORY     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    GERD (gastroesophageal reflux disease)     mild    Heart murmur     Wears glasses     reading     Past Surgical History:   Procedure Laterality Date    ANKLE SURGERY Right 1985    COLONOSCOPY  6/4/2009    ok, dr acosta    ENDOSCOPY, COLON, DIAGNOSTIC  06/04/2009    hiatal hernia dr acosta    HIP SURGERY Left 2/7/2025    LEFT HIP HEMIARTHROPLASTY ANTERIOR APPROACH performed by Alex Lemons MD at [facility] OR    INGUINAL HERNIA REPAIR N/A 10/13/2017    TONSILLECTOMY AND ADENOIDECTOMY       Family History   Problem Relation Age of Onset    Arthritis Mother     Heart Disease Father         pacemaker     Social History     Socioeconomic History    Marital status:      Spouse name: None    Number of children: 2    Years of education: None    Highest education level: None  identified   -Monitor and trend H&H.  Transfuse if less than 7.  -Further recommendations from Dr. Major    Left hip fracture  -Status post left hemiarthroplasty 2/7  -Recent mechanical fall in the ED.   -CT scan shows periprosthetic fracture of the proximal femur.   -Orthopedic following    Spine fracture  -L3 and L4 T12 fracture  -Primary following    4. Anasarca   -CT scan shows bilateral pleural effusion.  History of CHF  -Gentle IV fluids  -Primary following    5. ?  Incarcerated hernia  -General Surgery consulted    -Patient code status is DNR CC. Palliative care consulted.     RECOMMENDATIONS:    Iron studies  Monitor and trend H&H. Transfuse less than 7.   Further recommendations from Dr. Major    If you have any questions or need any further information, please feel free to contact our consult team.  Thank you for allowing us to participate in the care of Ran Espinoza.    The note was completed using Dragon voice recognition transcription. Every effort was made to ensure accuracy; however, inadvertent transcription errors may be present despite my best efforts to edit errors.      Birgit Flowers PA-C 3/4/2025 at 10:17 AM

## 2025-03-04 NOTE — CONSULTS
[facility]  Palliative Care   Consult Note    NAME:  Ran Espinoza  MEDICAL RECORD NUMBER:  [id number]  AGE: 89 y.o.   GENDER: male  : 1931  TODAY'S DATE:  3/4/2025    Subjective     Reason for Consult:  goals of care and hospice discussion  Visit Type: Initial Consult      Ran Espinoza is a 89 y.o. male referred by:   [x] Physician    PAST MEDICAL HISTORY      Diagnosis Date    Anemia     Cancer (HCC)     cancer on back of tongue--had chemo treatments--approx 5 years ago    GERD (gastroesophageal reflux disease)     mild    Heart murmur     Wears glasses     reading       PAST SURGICAL HISTORY  Past Surgical History:   Procedure Laterality Date    ANKLE SURGERY Right     COLONOSCOPY  2009    ok, dr acosta    ENDOSCOPY, COLON, DIAGNOSTIC  2009    hiatal hernia dr acosta    HIP SURGERY Left 2025    LEFT HIP HEMIARTHROPLASTY ANTERIOR APPROACH performed by Alex Lemons MD at [facility] OR    INGUINAL HERNIA REPAIR N/A 10/13/2017    TONSILLECTOMY AND ADENOIDECTOMY         FAMILY HISTORY  Family History   Problem Relation Age of Onset    Arthritis Mother     Heart Disease Father         pacemaker       SOCIAL HISTORY  Social History     Tobacco Use    Smoking status: Never    Smokeless tobacco: Never   Vaping Use    Vaping status: Never Used   Substance Use Topics    Alcohol use: Yes     Comment: minimal beer. Pt active plays basketball once a week    Drug use: No       ALLERGIES  Allergies   Allergen Reactions    Egg-Derived Products Other (See Comments)     headaches       MEDICATIONS  No current facility-administered medications on file prior to encounter.     Current Outpatient Medications on File Prior to Encounter   Medication Sig Dispense Refill    torsemide (DEMADEX) 10 MG tablet Take 1 tablet by mouth Twice a Week (Patient taking differently: Take 1 tablet by mouth Twice a Week On  and ) 8 tablet 0    aspirin 81 MG tablet Take 1 tablet by mouth in the  morning and at bedtime Take twice a day for 30 days after hip surgery then can resume daily dosing. (Patient taking differently: Take 1 tablet by mouth 2 times daily End date is 3/15/25) 60 tablet 0    ferrous sulfate (IRON 325) 325 (65 Fe) MG tablet Take 1 tablet by mouth 2 times daily 60 tablet 0    Ascorbic Acid (VITAMIN C) 500 MG tablet Take 1 tablet by mouth daily      Vitamin D (CHOLECALCIFEROL) 1000 UNITS CAPS capsule Take 1 capsule by mouth daily      Multiple Vitamins-Minerals (MULTI COMPLETE PO) Take 1 tablet by mouth daily.         Objective         /81   Pulse 84   Temp 97.8 °F (36.6 °C) (Oral)   Resp 21   Ht 1.702 m (5' 7\")   Wt 64.6 kg (142 lb 6.7 oz)   SpO2 92%   BMI 22.31 kg/m²     Code Status: DNR-CC    Advanced Directives: copy in epic his wife and daughters are agents, his wife prefers we call daughters as she is in assisted living and doesn't always answer phone,      Assessment        Management and Education    Persons available for education:        [x] Self     [] Caregiver       [] Spouse       [x] Other Family Member   []  Other    Spiritual History:  notified: Yes,     Does the patient have a Primary Care Physician?  Yes    Palliative Performance Scale:  60% [] Ambulation reduced; Significant disease; Can't do hobbies/housework; intake normal or reduced; occasional assist; LOC full/confusion  50% [] Mainly sit/lie; Extensive disease; Can't do any work; Considerable assist; intake normal or reduced; LOC full/confusion  40% [x] Mainly in bed; Extensive disease; Mainly assist; intake normal or reduced; occasional assist; LOC full/confusion  30% [] Bed Bound; Extensive disease; Total care; intake reduced; LOC full/confusion  20% [] Bed Bound; Extensive disease; Total care; intake minimal; Drowsy/coma  10% [] Bed Bound; Extensive disease; Total care; Mouth care only; Drowsy/coma  0 [] Death    Level of patient/caregiver understanding able to:        [x] Verbalize

## 2025-03-04 NOTE — ED NOTES
Pt put in a gown, brief changed, 1 BM formed. IV fluids started.     Pt alert to self, place, time, and why he is here \"something for his hip\"

## 2025-03-04 NOTE — ACP (ADVANCE CARE PLANNING)
Advance Care Planning     Palliative Team Advance Care Planning (ACP) Conversation    Date of Conversation: 03/04/25    Individuals present for the conversation: Legal healthcare agent named below     ACP documents on file prior to discussion:  -Power of  for Healthcare    Previously completed document/s not on file: N/A    Healthcare Decision Maker:    Primary Decision Maker: Nayeli Espinoza - Spouse - [phone]    Secondary Decision Maker: Tiffany Robbins - Child - [phone]    Supplemental (Other) Decision Maker: Alicia Roberts - Child - [phone]     Conversation Summary:  We have discussed at length what his wife and daughters want for Mr Espinoza at this time. They have agreed hospice is most appropriate at this time. They no longer desire aggressive care, no blood, no IV meds except for pain and anxiety, no surgery, no further work up for medical problems. He is DNR CC with plans to enroll in hospice care.   We discussed hospice care and philosophy she agrees hospice is appropriate at this time. List provided she agreed with [facility]. We discussed in patient hospice and need for symptom to manage, hospice in ECF hospice is covered by medicare and room and board is private pay or home which is not an option.      Resuscitation Status: DNR CC    Documentation Completed:  -Other none     I spent 45 minutes with the patient and/or surrogate decision maker discussing the patient's wishes and goals.      Electronically signed by Glendy TAVAREZ, RN, CHPN on 3/4/2025 at 12:20 PM  Palliative Care Nurse  Phone [phone]

## 2025-03-04 NOTE — ED NOTES
This nurse spoke to pt's daughter and updated her on pt's status and fall:   [place] [place]  Emergency Contact  [phone]  (+[phone])  Secondary Decision Maker  Notify on admission

## 2025-03-04 NOTE — PROGRESS NOTES
V2.0    [facility] Progress Note      Name:  Ran Espinoza /Age/Sex: 1931  (89 y.o. male)   MRN & CSN:  [id number] & [id number] Encounter Date/Time: 3/4/2025 6:10 PM EST   Location:  [id number] PCP: Wiliam Mahmood MD     Attending:Jesika Interiano MD       Hospital Day: 2    Assessment and Recommendations   Ran Espinoza is a 89 y.o. male who presents with Anemia      Plan:     # Chronic anemia  # Fall in emergency room  # Left intertrochanteric fracture  # Question of incarcerated hernia  # Large hiatal hernia  # New compression fracture at L3/L4 as well as T12  # Anasarca  # Probable pneumonia  # Old clavicular fracture  # History of CHF  # History of aortic stenosis      -Patient with chronic anemia, occult blood positive, hemoglobin is 7.7 which is not too far from his baseline, he has a h/o  GI bleed, currently without any overt bleeding, guaiac positive, will monitor, no indication for transfusion at this moment     -Patient recently had hip arthroplasty, fell in the ED again, has a new intertrochanteric fracture, will be n.p.o., orthopedics will be consulted for further recommendation     -There is a question of incarcerated hernia, minimal tenderness in his abdomen, will be n.p.o., general surgery will be consulted in the morning,     -Noted to have new compression fracture at L3/L4 and T12, will need pain management     -Patient has a known history of aortic stenosis currently with evidence of congestive heart failure, he has bilateral pleural effusions, he has anasarca and has significant edema of his extremities.  Since he will be n.p.o., he will be on gentle IV fluids but he will need to be diuresed, the IV fluid does put the patient at risk for worsening failure , will keep a close eye on his respiratory status     -Old clavicular fracture noted     -Old basilar infiltrates noted, currently white count is normal, I do not think he needs antibiotics, recently was treated for pneumonia  complications.  There appears to be an acute fracture extending through the left greater trochanter.  No dislocation.     Acute fracture extending through the left greater trochanter No dislocation     CTA ABDOMEN PELVIS W WO CONTRAST    Result Date: 3/3/2025  EXAMINATION: CTA OF THE ABDOMEN AND PELVIS WITH AND WITHOUT CONTRAST 3/3/2025 3:23 pm: TECHNIQUE: CTA of the abdomen and pelvis was performed without and with the administration of intravenous contrast. Multiplanar reformatted images are provided for review.  MIP images are provided for review. Automated exposure control, iterative reconstruction, and/or weight based adjustment of the mA/kV was utilized to reduce the radiation dose to as low as reasonably achievable. COMPARISON: CT 10/25/2019. HISTORY: ORDERING SYSTEM PROVIDED HISTORY: GI Bleed TECHNOLOGIST PROVIDED HISTORY: Reason for exam:->GI Bleed Additional Contrast?->1 Reason for Exam: GI Bleed Additional signs and symptoms: Abnormal Labs (Hgh: low) Pt from [facility] via [facility] for low hgb of 6.1 on labs done today. Pt A&0x4. Hx CHF, HTN, anemia, cancer. Relevant Medical/Surgical History: hernia repair FINDINGS: CTA ABDOMEN: Vascular: Mild multifocal atheromatous plaque involves the abdominal aorta which is normal in caliber.  There is no dissection or stenosis.  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  There is fibrocalcific plaque in this area. The mesenteric and renal arteries are otherwise normally patent.  No peripheral aneurysm or vascular occlusion is identified. Lower chest: See the separate report the CT of the chest performed the same time as this study. Organs: The liver, gallbladder, spleen, adrenal glands and pancreas are unremarkable.  The kidneys appear normal. GI/bowel: There is high attenuation material within the stomach and within portions of the bowel, particularly the colon which significantly  are age indeterminate.     1. Moderate bilateral pleural effusions have increased in size when compared to the prior exam. 2. Patchy airspace disease in the right upper lobe has improved but not completely resolved. 3. Large hiatal hernia. 4. Comminuted fracture of the medial right clavicle is unchanged from the prior exam. 5. Numerous thoracic compression fractures are similar to the prior exam and are age indeterminate.       CBC:   Recent Labs     03/03/25  1446 03/03/25  2205 03/04/25  0437   WBC 7.2  --  8.6   HGB 7.7* 6.5* 8.1*     --  190     BMP:    Recent Labs     03/03/25  1446 03/04/25  0437    143   K 4.6 4.3    109   CO2 25 24   BUN 37* 32*   CREATININE 1.2 1.0   GLUCOSE 108* 103*     Hepatic:   Recent Labs     03/03/25  1446 03/04/25  0437   AST 45* 37   ALT 16 16   BILITOT 0.6 1.0   ALKPHOS 162* 155*     Lipids:   Lab Results   Component Value Date/Time    CHOL 209 10/01/2021 11:10 AM    HDL 66 10/01/2020 09:46 AM    TRIG 56 10/01/2020 09:46 AM     Hemoglobin A1C: No results found for: \"LABA1C\"  TSH:   Lab Results   Component Value Date/Time    TSH 7.14 02/08/2025 10:36 AM     Troponin: No results found for: \"TROPONINT\"  Lactic Acid: No results for input(s): \"LACTA\" in the last 72 hours.  BNP: No results for input(s): \"PROBNP\" in the last 72 hours.  UA:  Lab Results   Component Value Date/Time    NITRU Negative 12/24/2024 01:49 PM    COLORU Yellow 12/24/2024 01:49 PM    PHUR 5.5 12/24/2024 01:49 PM    PHUR 6.5 10/03/2022 11:53 AM    LABCAST 1-3 Hyaline 01/31/2013 09:20 AM    WBCUA 0 12/24/2024 01:49 PM    RBCUA 0 12/24/2024 01:49 PM    BACTERIA None Seen 12/24/2024 01:49 PM    CLARITYU Clear 12/24/2024 01:49 PM    LEUKOCYTESUR Negative 12/24/2024 01:49 PM    UROBILINOGEN 1.0 12/24/2024 01:49 PM    BILIRUBINUR Negative 12/24/2024 01:49 PM    BLOODU Negative 12/24/2024 01:49 PM    GLUCOSEU Negative 12/24/2024 01:49 PM    GLUCOSEU NEGATIVE 09/03/2010 10:25 PM    KETUA Negative

## 2025-03-05 NOTE — PROGRESS NOTES
V2.0    [facility] [place] Progress Note      Name:  Ran Espinoza /Age/Sex: 1931  (89 y.o. male)   MRN & CSN:  [id number] & [id number] Encounter Date/Time: 3/5/2025 5:08 PM EST   Location:  [id number] PCP: Wiliam Mahmood MD     Attending:Jesika Interiano MD       Hospital Day: 3    Assessment and Recommendations   Ran Espinoza is a 89 y.o. male who presents with Anemia      Plan:     # Chronic anemia  # Fall in emergency room  # Left intertrochanteric fracture  # Question of incarcerated hernia  # Large hiatal hernia  # New compression fracture at L3/L4 as well as T12  # Anasarca  # Probable pneumonia  # Old clavicular fracture  # History of CHF  # History of aortic stenosis       -Patient with chronic anemia, occult blood positive, hemoglobin is 7.7 which is not too far from his baseline, he has a h/o  GI bleed, currently without any overt bleeding, guaiac positive, will monitor, no indication for transfusion at this moment     -Patient recently had hip arthroplasty, fell in the ED again, has a new intertrochanteric fracture, will be n.p.o., orthopedics will be consulted for further recommendation     -There is a question of incarcerated hernia, minimal tenderness in his abdomen, will be n.p.o., general surgery will be consulted in the morning,     -Noted to have new compression fracture at L3/L4 and T12, will need pain management     -Patient has a known history of aortic stenosis currently with evidence of congestive heart failure, he has bilateral pleural effusions, he has anasarca and has significant edema of his extremities.  Since he will be n.p.o., he will be on gentle IV fluids but he will need to be diuresed, the IV fluid does put the patient at risk for worsening failure , will keep a close eye on his respiratory status     -Old clavicular fracture noted     -Old basilar infiltrates noted, currently white count is normal, I do not think he needs antibiotics, recently was treated for  exam:->fall Reason for Exam: fall FINDINGS: Status post left hip hemiarthroplasty.  No hardware complications.  There appears to be an acute fracture extending through the left greater trochanter.  No dislocation.     Acute fracture extending through the left greater trochanter No dislocation     CTA ABDOMEN PELVIS W WO CONTRAST    Result Date: 3/3/2025  EXAMINATION: CTA OF THE ABDOMEN AND PELVIS WITH AND WITHOUT CONTRAST 3/3/2025 3:23 pm: TECHNIQUE: CTA of the abdomen and pelvis was performed without and with the administration of intravenous contrast. Multiplanar reformatted images are provided for review.  MIP images are provided for review. Automated exposure control, iterative reconstruction, and/or weight based adjustment of the mA/kV was utilized to reduce the radiation dose to as low as reasonably achievable. COMPARISON: CT 10/25/2019. HISTORY: ORDERING SYSTEM PROVIDED HISTORY: GI Bleed TECHNOLOGIST PROVIDED HISTORY: Reason for exam:->GI Bleed Additional Contrast?->1 Reason for Exam: GI Bleed Additional signs and symptoms: Abnormal Labs (Hgh: low) Pt from [facility] via [facility] for low hgb of 6.1 on labs done today. Pt A&0x4. Hx CHF, HTN, anemia, cancer. Relevant Medical/Surgical History: hernia repair FINDINGS: CTA ABDOMEN: Vascular: Mild multifocal atheromatous plaque involves the abdominal aorta which is normal in caliber.  There is no dissection or stenosis.  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  There is fibrocalcific plaque in this area. The mesenteric and renal arteries are otherwise normally patent.  No peripheral aneurysm or vascular occlusion is identified. Lower chest: See the separate report the CT of the chest performed the same time as this study. Organs: The liver, gallbladder, spleen, adrenal glands and pancreas are unremarkable.  The kidneys appear normal. GI/bowel: There is high attenuation material  unchanged from the prior exam.  Numerous thoracic compression fractures are similar to the prior exam and are age indeterminate.     1. Moderate bilateral pleural effusions have increased in size when compared to the prior exam. 2. Patchy airspace disease in the right upper lobe has improved but not completely resolved. 3. Large hiatal hernia. 4. Comminuted fracture of the medial right clavicle is unchanged from the prior exam. 5. Numerous thoracic compression fractures are similar to the prior exam and are age indeterminate.       CBC:   Recent Labs     03/03/25  1446 03/03/25  2205 03/04/25  0437   WBC 7.2  --  8.6   HGB 7.7* 6.5* 8.1*     --  190     BMP:    Recent Labs     03/03/25  1446 03/04/25  0437    143   K 4.6 4.3    109   CO2 25 24   BUN 37* 32*   CREATININE 1.2 1.0   GLUCOSE 108* 103*     Hepatic:   Recent Labs     03/03/25  1446 03/04/25  0437   AST 45* 37   ALT 16 16   BILITOT 0.6 1.0   ALKPHOS 162* 155*     Lipids:   Lab Results   Component Value Date/Time    CHOL 209 10/01/2021 11:10 AM    HDL 66 10/01/2020 09:46 AM    TRIG 56 10/01/2020 09:46 AM     Hemoglobin A1C: No results found for: \"LABA1C\"  TSH:   Lab Results   Component Value Date/Time    TSH 7.14 02/08/2025 10:36 AM     Troponin: No results found for: \"TROPONINT\"  Lactic Acid: No results for input(s): \"LACTA\" in the last 72 hours.  BNP: No results for input(s): \"PROBNP\" in the last 72 hours.  UA:  Lab Results   Component Value Date/Time    NITRU Negative 12/24/2024 01:49 PM    COLORU Yellow 12/24/2024 01:49 PM    PHUR 5.5 12/24/2024 01:49 PM    PHUR 6.5 10/03/2022 11:53 AM    LABCAST 1-3 Hyaline 01/31/2013 09:20 AM    WBCUA 0 12/24/2024 01:49 PM    RBCUA 0 12/24/2024 01:49 PM    BACTERIA None Seen 12/24/2024 01:49 PM    CLARITYU Clear 12/24/2024 01:49 PM    LEUKOCYTESUR Negative 12/24/2024 01:49 PM    UROBILINOGEN 1.0 12/24/2024 01:49 PM    BILIRUBINUR Negative 12/24/2024 01:49 PM    BLOODU Negative 12/24/2024 01:49 PM

## 2025-03-05 NOTE — PLAN OF CARE
Problem: Chronic Conditions and Co-morbidities  Goal: Patient's chronic conditions and co-morbidity symptoms are monitored and maintained or improved  3/5/2025 1229 by Julio Reyez RN  Outcome: Progressing     Problem: Discharge Planning  Goal: Discharge to home or other facility with appropriate resources  3/5/2025 1229 by Julio Reyez RN  Outcome: Progressing     Problem: Pain  Goal: Verbalizes/displays adequate comfort level or baseline comfort level  3/5/2025 1229 by Julio Reyez RN  Outcome: Progressing     Problem: Skin/Tissue Integrity  Goal: Skin integrity remains intact  Description: 1.  Monitor for areas of redness and/or skin breakdown  2.  Assess vascular access sites hourly  3.  Every 4-6 hours minimum:  Change oxygen saturation probe site  4.  Every 4-6 hours:  If on nasal continuous positive airway pressure, respiratory therapy assess nares and determine need for appliance change or resting period  3/5/2025 1229 by Julio Reyez RN  Outcome: Progressing  Flowsheets (Taken 3/5/2025 0830)  Skin Integrity Remains Intact: Monitor for areas of redness and/or skin breakdown     Problem: ABCDS Injury Assessment  Goal: Absence of physical injury  Outcome: Progressing     Problem: Confusion  Goal: Confusion, delirium, dementia, or psychosis is improved or at baseline  Description: INTERVENTIONS:  1. Assess for possible contributors to thought disturbance, including medications, impaired vision or hearing, underlying metabolic abnormalities, dehydration, psychiatric diagnoses, and notify attending LIP  2. [place] high risk fall precautions, as indicated  3. Provide frequent short contacts to provide reality reorientation, refocusing and direction  4. Decrease environmental stimuli, including noise as appropriate  5. Monitor and intervene to maintain adequate nutrition, hydration, elimination, sleep and activity  6. If unable to ensure safety without constant attention obtain sitter and review  mobility to safest level of function  Outcome: Progressing     Problem: Musculoskeletal - Adult  Goal: Return ADL status to a safe level of function  Outcome: Progressing     Problem: Gastrointestinal - Adult  Goal: Minimal or absence of nausea and vomiting  Outcome: Progressing  Flowsheets (Taken 3/5/2025 0830)  Minimal or absence of nausea and vomiting: Administer IV fluids as ordered to ensure adequate hydration     Problem: Genitourinary - Adult  Goal: Absence of urinary retention  Outcome: Progressing  Flowsheets (Taken 3/5/2025 0830)  Absence of urinary retention: Assess patient’s ability to void and empty bladder     Problem: Infection - Adult  Goal: Absence of infection at discharge  Outcome: Progressing     Problem: Infection - Adult  Goal: Absence of fever/infection during anticipated neutropenic period  Outcome: Progressing     Problem: Metabolic/Fluid and Electrolytes - Adult  Goal: Electrolytes maintained within normal limits  Outcome: Progressing     Problem: Hematologic - Adult  Goal: Maintains hematologic stability  3/5/2025 1229 by Julio Reyez RN  Outcome: Progressing     Problem: Safety - Medical Restraint  Goal: Remains free of injury from restraints (Restraint for Interference with Medical Device)  Description: INTERVENTIONS:  1. Determine that other, less restrictive measures have been tried or would not be effective before applying the restraint  2. Evaluate the patient's condition at the time of restraint application  3. Inform patient/family regarding the reason for restraint  4. Q2H: Monitor safety, psychosocial status, comfort, nutrition and hydration  3/5/2025 1229 by Julio Reyez RN  Outcome: Completed

## 2025-03-05 NOTE — PLAN OF CARE
Problem: Safety - Medical Restraint  Goal: Remains free of injury from restraints (Restraint for Interference with Medical Device)  Description: INTERVENTIONS:  1. Determine that other, less restrictive measures have been tried or would not be effective before applying the restraint  2. Evaluate the patient's condition at the time of restraint application  3. Inform patient/family regarding the reason for restraint  4. Q2H: Monitor safety, psychosocial status, comfort, nutrition and hydration  3/5/2025 0142 by Derrek Hawkins RN  Outcome: Progressing  Flowsheets (Taken 3/4/2025 0600 by Yumi Chacon RN)  Remains free of injury from restraints (restraint for interference with medical device): Every 2 hours: Monitor safety, psychosocial status, comfort, nutrition and hydration     Problem: Chronic Conditions and Co-morbidities  Goal: Patient's chronic conditions and co-morbidity symptoms are monitored and maintained or improved  3/5/2025 0142 by Derrek Hawkins RN  Outcome: Progressing  Flowsheets (Taken 3/4/2025 1659 by Dorota Singh, RN)  Care Plan - Patient's Chronic Conditions and Co-Morbidity Symptoms are Monitored and Maintained or Improved:   Monitor and assess patient's chronic conditions and comorbid symptoms for stability, deterioration, or improvement   Collaborate with multidisciplinary team to address chronic and comorbid conditions and prevent exacerbation or deterioration     Problem: Discharge Planning  Goal: Discharge to home or other facility with appropriate resources  3/5/2025 0142 by Derrek Hawkins RN  Outcome: Progressing  Flowsheets (Taken 3/4/2025 1659 by Dorota Singh, RN)  Discharge to home or other facility with appropriate resources:   Identify barriers to discharge with patient and caregiver   Arrange for needed discharge resources and transportation as appropriate   Identify discharge learning needs (meds, wound care, etc)     Problem: Pain  Goal: Verbalizes/displays adequate comfort level or  Initiate Psychosocial CNS and Spiritual Care consult, as indicated  3/5/2025 0142 by Derrek Hawkins, RN  Outcome: Progressing  Flowsheets (Taken 3/4/2025 1659 by Dorota Singh, CAROL)  Effect of thought disturbance (confusion, delirium, dementia, or psychosis) are managed with adequate functional status:   Assess for contributors to thought disturbance, including medications, impaired vision or hearing, underlying metabolic abnormalities, dehydration, psychiatric diagnoses, notify LIP   [place] high risk fall precautions, as indicated   Provide frequent short contacts to provide reality reorientation, refocusing and direction   Decrease environmental stimuli, including noise as appropriate     Problem: Hematologic - Adult  Goal: Maintains hematologic stability  Outcome: Progressing  Flowsheets (Taken 3/5/2025 0142)  Maintains hematologic stability:   Assess for signs and symptoms of bleeding or hemorrhage   Monitor labs for bleeding or clotting disorders   Administer blood products/factors as ordered

## 2025-03-05 NOTE — PROGRESS NOTES
Comprehensive Nutrition Assessment    Type and Reason for Visit:  Initial, Positive nutrition screen, Wound    Nutrition Recommendations/Plan:   Patient going hospice care   Continue current orders   Monitor for charges in goals of care   Nutrition sign off      Malnutrition Assessment:  Malnutrition Status:  At risk for malnutrition (03/05/25 1330)    Context:  Acute Illness     Findings of the 6 clinical characteristics of malnutrition:  Energy Intake:  Unable to assess (NPO for 2 days)  Weight Loss:  Greater than 2% over 1 week     Body Fat Loss:  Unable to assess     Muscle Mass Loss:  Unable to assess    Fluid Accumulation:  Moderate to Severe Extremities (RUE,LUE +2 pitting, RLE,LLE trace)   Strength:  Not Performed    Nutrition Assessment:    Triggers for nutritional risk with MST score of 3 for unintended weight loss and decrease appetite. Admitted for anemia. PMH of hyperlipidemia, CHF, lymphoma. Last BM 3/4 per ED. No recent labs to review. Patient going hospice care.    Nutrition Related Findings:    Edema: RUE,LUE +2 pitting; RLE,LLE trace. Wound Type: Multiple, Pressure Injury, Stage II, Stage I (posterior sacrum, posterior right ankle stage 2, left heel stage 1)       Current Nutrition Intake & Therapies:    Average Meal Intake: NPO  Average Supplements Intake: None Ordered  Diet NPO Exceptions are: Sips of Water with Meds    Anthropometric Measures:  Height: 170.2 cm (5' 7.01\")  Ideal Body Weight (IBW): 148 lbs (67 kg)    Admission Body Weight: 64.6 kg (142 lb 6.7 oz)  Current Body Weight: 58.4 kg (128 lb 12 oz), 87 % IBW. Weight Source: Bed scale  Current BMI (kg/m2): 20.2           Weight Adjustment For: No Adjustment                 BMI Categories: Underweight (BMI less than 22) age over 65    Estimated Daily Nutrient Needs:  Energy Requirements Based On: Kcal/kg  Weight Used for Energy Requirements: Current  Energy (kcal/day): 1752-2044kcal (30-35kcal/58.4kg)  Weight Used for Protein  Requirements: Current  Protein (g/day): 70-88g (1.2-1.5g/58.4kg)  Method Used for Fluid Requirements: Defer to provider  Fluid (ml/day):      Nutrition Diagnosis:   No nutrition diagnosis at this time     Nutrition Interventions:   Food and/or Nutrient Delivery: Continue NPO  Nutrition Education/Counseling: No recommendation at this time  Coordination of Nutrition Care: Continue to monitor while inpatient       Goals:  Goals: Initiate PO diet, by next RD assessment  Type of Goal: New goal       Nutrition Monitoring and Evaluation:   Behavioral-Environmental Outcomes: None Identified  Food/Nutrient Intake Outcomes: Progression of Nutrition  Physical Signs/Symptoms Outcomes: Nutrition Focused Physical Findings, Biochemical Data, Skin, Weight    Discharge Planning:    Too soon to determine     Nicolette Gonzalez  Contact: [phone]

## 2025-03-05 NOTE — PROGRESS NOTES
[facility]  Palliative Care   Progress Note    NAME:  Ran Espnioza  MEDICAL RECORD NUMBER:  [id number]  AGE: 89 y.o.   GENDER: male  : 1931  TODAY'S DATE:  3/5/2025    Subjective: resting quietly, restraints off    Objective:    Vitals:    25 1226   BP: (!) 141/84   Pulse: 79   Resp: 18   Temp: 97.6 °F (36.4 °C)   SpO2:      Lab Results   Component Value Date    WBC 8.6 2025    HGB 8.1 (L) 2025    HCT 24.9 (L) 2025    .2 (H) 2025     2025     Lab Results   Component Value Date    CREATININE 1.0 2025    BUN 32 (H) 2025     2025    K 4.3 2025     2025    CO2 24 2025     Lab Results   Component Value Date    ALT 16 2025    AST 37 2025    ALKPHOS 155 (H) 2025    BILITOT 1.0 2025       Plan: I spoke with his daughter Tiffany Robbins, she is in agreement with him going to [facility] with hospice care. She has requested a switch to [facility] as [facility] prefers their services. I have informed [facility] liaison and called [facility] to inform them of referral.       Code Status: DNR-CC  Discharge Environment:  [x] Hospice Consult Agency: now [facility]   [x] ECF with Hospice    Teaching Time:  0hours  30 min     I will continue to follow Mr. Espinoza's care as needed.      Thank you for allowing me to participate in the care of Mr. Espinoza .     Electronically signed by Christina Gonzalez, RN, BSN,CHPN on 3/5/2025 at 1:12 PM  Palliative Care Nurse [facility]  Office: [phone]

## 2025-03-05 NOTE — CARE COORDINATION
Wound care consulted for \"DTI on buttocks (as well as Stage 2 on right ankle and Stage 1 on left heel).\" Pt seen by wound care. Pt with no wounds to buttock area. Superficial abrasions/possible pressure to L heel and R outer ankle (all POA). Can use silicone borders or skin prep. Clear border in place for prevention to buttock area which can remain in place for up to 7 days. Plans for discharge to hospice.   Will not continue to follow. Removed LDA's.     R OUTER ANKLE    R HEEL     L heel    BUTTOCKS/SACRUM  Electronically signed by Anna Garcia RN CWOCN on 3/5/2025 at 2:03 PM

## 2025-03-05 NOTE — CARE COORDINATION
Spoke with [facility] who states that plan is for patient to transfer to [facility] private pay with [facility].     LSW placed call to [facility] regarding policy on how long patient needs to be restraint free.     Per [facility] they plan to meet with family around 3:30 on 3/6/25.  Electronically signed by ZINA Novak on 3/5/2025 at 4:04 PM

## 2025-03-06 NOTE — CARE COORDINATION
DELMIS reached out to Yamileth at [facility] today at [phone]. DELMIS left message for her today so we can begin the process of discharge planning for this patient.     Respectfully submitted,    Twila ROSEN, MAGDALENE  [facility]   [phone]    Electronically signed by SILAS Maldonado, ZINA on 3/6/2025 at 11:15 AM

## 2025-03-06 NOTE — PROGRESS NOTES
Called report to Martita MEDINA @ [facility]. All questions answered. Patient's  time is at 6 pm    Electronically signed by Julio Reyez RN on 3/6/2025 at 5:03 PM     no lesions,  no deformities,  no traumatic injuries,  no significant scars are present,  chest wall non-tender,  no masses present, breathing is unlabored without accessory muscle use,normal breath sounds

## 2025-03-06 NOTE — PLAN OF CARE
Problem: Chronic Conditions and Co-morbidities  Goal: Patient's chronic conditions and co-morbidity symptoms are monitored and maintained or improved  3/6/2025 0923 by Julio Reyez RN  Outcome: Progressing  Flowsheets (Taken 3/6/2025 0855)  Care Plan - Patient's Chronic Conditions and Co-Morbidity Symptoms are Monitored and Maintained or Improved: Monitor and assess patient's chronic conditions and comorbid symptoms for stability, deterioration, or improvement     Problem: Discharge Planning  Goal: Discharge to home or other facility with appropriate resources  3/6/2025 0923 by Julio Reyez RN  Outcome: Progressing  Flowsheets (Taken 3/6/2025 0855)  Discharge to home or other facility with appropriate resources: Identify barriers to discharge with patient and caregiver     Problem: Pain  Goal: Verbalizes/displays adequate comfort level or baseline comfort level  3/6/2025 0923 by Julio Reyez RN  Outcome: Progressing     Problem: Skin/Tissue Integrity  Goal: Skin integrity remains intact  Description: 1.  Monitor for areas of redness and/or skin breakdown  2.  Assess vascular access sites hourly  3.  Every 4-6 hours minimum:  Change oxygen saturation probe site  4.  Every 4-6 hours:  If on nasal continuous positive airway pressure, respiratory therapy assess nares and determine need for appliance change or resting period  3/6/2025 0923 by Julio Reyez RN  Outcome: Progressing  Flowsheets (Taken 3/6/2025 0855)  Skin Integrity Remains Intact: Monitor for areas of redness and/or skin breakdown     Problem: ABCDS Injury Assessment  Goal: Absence of physical injury  3/6/2025 0923 by Julio Reyez RN  Outcome: Progressing     Problem: Confusion  Goal: Confusion, delirium, dementia, or psychosis is improved or at baseline  Description: INTERVENTIONS:  1. Assess for possible contributors to thought disturbance, including medications, impaired vision or hearing, underlying metabolic abnormalities,  dehydration, psychiatric diagnoses, and notify attending LIP  2. [place] high risk fall precautions, as indicated  3. Provide frequent short contacts to provide reality reorientation, refocusing and direction  4. Decrease environmental stimuli, including noise as appropriate  5. Monitor and intervene to maintain adequate nutrition, hydration, elimination, sleep and activity  6. If unable to ensure safety without constant attention obtain sitter and review sitter guidelines with assigned personnel  7. Initiate Psychosocial CNS and Spiritual Care consult, as indicated  3/6/2025 0923 by Julio Reyez RN  Outcome: Progressing  Flowsheets (Taken 3/6/2025 0855)  Effect of thought disturbance (confusion, delirium, dementia, or psychosis) are managed with adequate functional status: Assess for contributors to thought disturbance, including medications, impaired vision or hearing, underlying metabolic abnormalities, dehydration, psychiatric diagnoses, notify LIP     Problem: Safety - Adult  Goal: Free from fall injury  3/6/2025 0923 by Julio Reyez RN  Outcome: Progressing     Problem: Neurosensory - Adult  Goal: Achieves stable or improved neurological status  3/6/2025 0923 by Julio Reyez RN  Outcome: Progressing  Flowsheets (Taken 3/6/2025 0855)  Achieves stable or improved neurological status: Assess for and report changes in neurological status     Problem: Respiratory - Adult  Goal: Achieves optimal ventilation and oxygenation  3/6/2025 0923 by Julio Reyez RN  Outcome: Progressing  Flowsheets (Taken 3/6/2025 0855)  Achieves optimal ventilation and oxygenation: Assess for changes in mentation and behavior     Problem: Skin/Tissue Integrity - Adult  Goal: Skin integrity remains intact  Description: 1.  Monitor for areas of redness and/or skin breakdown  2.  Assess vascular access sites hourly  3.  Every 4-6 hours minimum:  Change oxygen saturation probe site  4.  Every 4-6 hours:  If on nasal continuous  Performed Resulted

## 2025-03-06 NOTE — CARE COORDINATION
DELMIS faxed AVS to [employer] today at [phone].     No further needs noted.     Respectfully submitted,    Twila Oliveira-Frank ROSEN, MAGDALENE  [facility]   [phone]    Electronically signed by SILAS Maldonado, LSW on 3/6/2025 at 5:14 PM

## 2025-03-06 NOTE — DISCHARGE INSTR - COC
Continuity of Care Form    Patient Name: Ran Espinoza   :  1931  MRN:  [id number]    Admit date:  3/3/2025  Discharge date:  3/6/25    Code Status Order: DNR-CC   Advance Directives:   Advance Care Flowsheet Documentation             Admitting Physician:  No admitting provider for patient encounter.  PCP: Wiliam Mahmood MD    Discharging Nurse: Julio Reyez RN  Discharging Hospital Unit/Room#: Q6X-4316/5281-01  Discharging Unit Phone Number: [phone]    Emergency Contact:   Extended Emergency Contact Information  Primary Emergency Contact: Tiffany Robbins  Home Phone: [phone]  Mobile Phone: [phone]  Relation: Child  Secondary Emergency Contact: Alicia Roberts  Home Phone: [phone]  Mobile Phone: [phone]  Relation: Child    Past Surgical History:  Past Surgical History:   Procedure Laterality Date    ANKLE SURGERY Right     COLONOSCOPY  2009    ok, dr acosta    ENDOSCOPY, COLON, DIAGNOSTIC  2009    hiatal hernia dr acosta    HIP SURGERY Left 2025    LEFT HIP HEMIARTHROPLASTY ANTERIOR APPROACH performed by Alex Lemons MD at [facility] OR    INGUINAL HERNIA REPAIR N/A 10/13/2017    TONSILLECTOMY AND ADENOIDECTOMY         Immunization History:   Immunization History   Administered Date(s) Administered    COVID-19, MODERNA BLUE border, Primary or Immunocompromised, (age 12y+), IM, 100 mcg/0.5mL 2021    Influenza, FLUAD, (age 65 y+), IM, Quadv, 0.5mL 10/01/2020, 10/01/2021, 10/03/2022, 10/03/2023    Influenza, FLUAD, (age 65 y+), IM, Trivalent PF, 0.5mL 2019    Influenza, FLUZONE High Dose, (age 65 y+), IM, Trivalent PF, 0.5mL 2014, 2014, 2016, 2017, 10/24/2018    Pneumococcal, PCV-13, PREVNAR 13, (age 6w+), IM, 0.5mL 2015    Pneumococcal, PPSV23, PNEUMOVAX 23, (age 2y+), SC/IM, 0.5mL 2007, 2013    Td vaccine (adult) 2007    Zoster Live (Zostavax) 2013    Zoster Recombinant (Shingrix) 2019, 2019        Active Problems:  Patient Active Problem List   Diagnosis Code    PHN (postherpetic neuralgia) B02.29    H/O lymphoma Z85.72    Heart murmur, systolic R01.1    Elevated liver function tests 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    Mixed hyperlipidemia E78.2    Right inguinal hernia K40.90    Anemia D64.9    Closed fracture of left hip (HCC) S72.002A    Nonrheumatic aortic valve stenosis I35.0    Congestive heart failure (HCC) I50.9    Acute upper GI bleeding K92.2       Isolation/Infection:   Isolation            No Isolation          Patient Infection Status       None to display                     Nurse Assessment:  Last Vital Signs: /82   Pulse 84   Temp 97.6 °F (36.4 °C) (Axillary)   Resp 22   Ht 1.702 m (5' 7.01\")   Wt 58.4 kg (128 lb 12 oz)   SpO2 97%   BMI 20.16 kg/m²     Last documented pain score (0-10 scale): Pain Level: 0  Last Weight:   Wt Readings from Last 1 Encounters:   03/06/25 58.4 kg (128 lb 12 oz)     Mental Status:  disoriented    IV Access:  - None    Nursing Mobility/ADLs:  Walking   Dependent  Transfer  Dependent  Bathing  Dependent  Dressing  Dependent  Toileting  Dependent  Feeding  Dependent  Med Admin  Dependent  Med Delivery    iv meds (pt npo)    Wound Care Documentation and Therapy:        Elimination:  Continence:   Bowel: No  Bladder: No  Urinary Catheter: None   Colostomy/Ileostomy/Ileal Conduit: No       Date of Last BM: 3/5/25    Intake/Output Summary (Last 24 hours) at 3/6/2025 0933  Last data filed at 3/6/2025 0628  Gross per 24 hour   Intake --   Output 800 ml   Net -800 ml     I/O last 3 completed shifts:  In: 10 [I.V.:10]  Out: 800 [Urine:800]    Safety Concerns:     Sundowners [place], At Risk for Falls, and Aspiration Risk    Impairments/Disabilities:      Speech and Hearing    Nutrition

## 2025-03-06 NOTE — CARE COORDINATION
Case Management Discharge Note          Date / Time of Note: 3/6/2025 12:33 PM                  Patient Name: Ran Espinoza   YOB: 1931  Diagnosis: Ventral hernia without obstruction or gangrene [K43.9]  Hiatal hernia [K44.9]  Acute blood loss anemia [D62]  Acute upper GI bleeding [K92.2]  Anemia [D64.9]  Positive occult stool blood test [R19.5]  Bilateral pleural effusion [J90]  Goals of care, counseling/discussion [Z71.89]  Abnormal finding on CT scan [R93.89]  Closed fracture of left hip, initial encounter ([facility]) [S72.002A]  Fall from standing, initial encounter [W19.XXXA]  Skin tear of left forearm without complication, initial encounter [S51.812A]   Date / Time: 3/3/2025  2:13 PM    Financial:  Payor: MEDICARE / Plan: MEDICARE PART A AND B / Product Type: *No Product type* /      Pharmacy:    [facility]/pharmacy #[id number] - [place], [place] - [address] - P [phone] - F [phone]  [address]  [place] [address]  Phone: [phone] Fax: [phone]      Assistance purchasing medications?: Potential Assistance Purchasing Medications: No  Assistance provided by Case Management: None at this time    DISCHARGE Disposition: Long Term Care Facility (LTC)    Nursing Facility:   Name: [facility]  Address:  [address] [place] [address]  Report Phone:  [phone]  Fax: [phone]     LOC at discharge: Long Term Care with Hospice  SEMAJ Completed: Yes             Notification completed in HENS/PAS?:  Yes : CM has completed HENS online through secure website for SNF admission at [facility].   Document ID #: [id number]    Hospice Services:  Location: Nursing Facility  Agency: [facility]  Phone: [phone]    Consents signed: Yes    Transportation:  Transportation PLAN for discharge: EMS transportation   Mode of Transport: Ambulance stretcher - BLS  Reason for medical transport: Other: congestive heart  failure  Name of Transport Company: [email or web address]  Phone: [phone]  Time of Transport: 1800    Transport form completed: Yes    IMM Completed:   Yes, Case management has presented and reviewed IMM letter #2.       IMM Letter date given:: 03/03/25   .   Patient and/or family/POA verbalized understanding of their medicare rights and appeal process if needed. Patient and/or family/POA has signed, initialed and placed the date and time on IMM letter #2 on the the appropriate lines. Copy of letter offered and they are aware that the original copy of IMM letter #2 is available prior to discharge from the paper chart on the unit.  Electronic documentation has been entered into epic for IMM letter #2 and original paper copy has been added to the paper chart at the nurses station.     Additional CM Notes:     The Plan for Transition of Care is related to the following treatment goals of Ventral hernia without obstruction or gangrene [K43.9]  Hiatal hernia [K44.9]  Acute blood loss anemia [D62]  Acute upper GI bleeding [K92.2]  Anemia [D64.9]  Positive occult stool blood test [R19.5]  Bilateral pleural effusion [J90]  Goals of care, counseling/discussion [Z71.89]  Abnormal finding on CT scan [R93.89]  Closed fracture of left hip, initial encounter ([facility]) [S72.002A]  Fall from standing, initial encounter [W19.XXXA]  Skin tear of left forearm without complication, initial encounter [S51.812A]    Respectfully submitted,    MAGDALENE Galvin  [facility]   [phone]    Electronically signed by SILAS Maldonado, LSW on 3/6/2025 at 12:32 PM

## 2025-03-06 NOTE — PROGRESS NOTES
Discharge orders acknowledged by RN . . AVS reviewed and all questions answered.  Pt was sent electronic to be filled . IV removed. Required core measures completed. Pt vitals WDL. Pt discharged with all belongings to [facility] with transport with [facility].  Pt transported off of unit via stretcher. No complications.     Electronically signed by Julio Reyez RN on 3/6/2025 at 6:23 PM

## 2025-03-06 NOTE — CARE COORDINATION
SW received call back from Kristine at [facility] who is covering for Yamileth today. She stated that they are ready to receive him and 6pm would work for them. SW spoke with hospice nurse who had questions about equipment that might be needed post discharge. SW advised that patient was leaving with transport.     Respectfully submitted,    Twila ROSEN, MAGDALENE  [facility]   [phone]    Electronically signed by SILAS Maldonado, MATEUSW on 3/6/2025 at 5:13 PM

## 2025-03-06 NOTE — CARE COORDINATION
Discharge order noted. PASSR completed for Tariq Trails  [id number] is the document ID number. It will be placed on his soft chart momentarily.     Respectfully submitted,    Twila ROSEN, MAGDALENE  [facility]   [phone]    Electronically signed by SILAS Maldonado, MATEUSW on 3/6/2025 at 11:12 AM

## 2025-03-07 ENCOUNTER — CARE COORDINATION (OUTPATIENT)
Dept: CARE COORDINATION | Age: 89
End: 2025-03-07

## 2025-03-07 NOTE — CARE COORDINATION
Patient discharged to [facility] (had been at [facility])- writer does not follow at .

## 2025-03-10 ENCOUNTER — TELEPHONE (OUTPATIENT)
Dept: ORTHOPEDIC SURGERY | Age: 89
End: 2025-03-10

## 2025-03-17 NOTE — PROGRESS NOTES
Physician Progress Note      PATIENT:               STACY ANSARI  CSN #:                  [id number]  :                       1931  ADMIT DATE:       3/3/2025 2:13 PM  DISCH DATE:        3/6/2025 6:23 PM  RESPONDING  PROVIDER #:        Jesika Interiano MD          QUERY TEXT:    Internal Medicine,    Patient admitted with anemia, compression fracture, left hip periprosthetic   fracture following a fall in the ED. GI bleeding is documented however patient   transferred to hospice and work up was not completed.  If able to be   determined, please document in progress notes and discharge summary the cause   of the GI bleeding.    The medical record reflects the following:  Risk Factors: GERD, ASA use  Clinical Indicators: hemoccult positive and GIB documented, ABLA documented by   Gastroenterology and the ED physician, take baby ASA daily, Imaging showed   large hiatal hernia and ventral hernia containing fat and fluid with possible   incarceration  Treatment: imaging, labs, GI and General Surgery consults, PRBC transfusion,   medical management    Thank you  Options provided:  -- GI bleeding due to hiatal hernia  -- GI bleeding due to ventral hernia  -- GI bleeding due to, please document cause  -- Other - I will add my own diagnosis  -- Disagree - Not applicable / Not valid  -- Disagree - Clinically unable to determine / Unknown  -- Refer to Clinical Documentation Reviewer    PROVIDER RESPONSE TEXT:    This patient has GI bleeding due to hiatal hernia    Query created by: Bertha Gagnon on 3/11/2025 2:00 PM      Electronically signed by:  Jesika Interiano MD 3/17/2025 2:16 PM

## 2025-04-09 NOTE — OP NOTE
[address] [place] [address]                               OPERATIVE REPORT    PATIENT NAME: James Durham                    :             1931  MED REC NO:   [id number]                           ROOM:  ACCOUNT NO:   [de-identified]                           ADMISSION DATE:  10/13/2017  PROVIDER:     Marcelle Freire MD      DATE OF PROCEDURE:  10/13/2017    PREOPERATIVE DIAGNOSIS:  Right inguinal hernia. POSTOPERATIVE DIAGNOSIS:  Right inguinal hernia. OPERATION PERFORMED:  Repair of right inguinal hernia with mesh. SURGEON:  Marcelle Freire MD    SPECIMENS:  Hernia sac. COMPLICATIONS:  None. DISPOSITION:  To recovery in stable condition. INDICATIONS:  The patient is a 20-year-old male with an enlarging and  now painful right inguinal hernia. It remains reducible, however, due  to worsening symptoms, he asks for repair. The risks, benefits, and  alternatives were reviewed and he agreed to proceed. OPERATIVE PROCEDURE:  The patient was brought to the operating room,  placed supine, sedation delivered, and the right groin prepped and  draped in a sterile fashion. Local anesthetic was infused and an  oblique incision made over the inguinal canal on the right side and  carried through the subcutaneous tissue. The external oblique was  then identified and opened through the external ring. An obvious  hernia was noted at the medial aspect of the spermatic cord. We  dissected the cord free and retracted with a Penrose drain. The  underside of the cord was cleared, and the floor of the inguinal canal  noted to be intact. The cremasters were divided, and the hernia sac  encountered. This was dissected free off the spermatic cord  structures back to the internal ring. The contents of the hernia sac were easily reduced and the sac was  then ligated with a 2-0 Vicryl suture ligature.   The hernia sac What Is The Reason For Today's Visit?: Full Body Skin Examination What Is The Reason For Today's Visit? (Being Monitored For X): concerning skin lesions on an annual basis was  then divided and passed off as specimen. The internal ring had been  dilated by the hernia, so we imbricated this with interrupted 2-0  Vicryl medial to the spermatic cord. A Marlex mesh was now cut to  size and anchored at the pubic tubercle with a 2-0 PDS. That was run  from medial to lateral along the shelving edge of the inguinal  ligament until lateral to the internal ring. The lateral side of the  mesh had been cut and the tails brought around the internal ring and  secured laterally with interrupted sutures. The medial edge of the  mesh was now anchored with interrupted 2-0 PDS. At the internal ring,  a forceps could just pass through the mesh along with the spermatic  cord. The oblique was now closed with a running 2-0 Vicryl and the skin  closed with 3-0 and 4-0 Vicryl. Dressings were applied, and the  patient was transferred to recovery in good condition.         Dorothy Strong MD    D: 10/13/2017 15:53:13       T: 10/13/2017 15:55:23     [email or web address]  Job#: [id number]     Doc#: [id number]

## (undated) DEVICE — DRAPE,REIN 53X77,STERILE: Brand: MEDLINE

## (undated) DEVICE — ELECTRODE ES AD PED L65IN TEF INSUL MOD NONCORDED NDL TIP

## (undated) DEVICE — TOTAL BASIC: Brand: MEDLINE INDUSTRIES, INC.

## (undated) DEVICE — SUTURE VICRYL + 1 L27IN ABSRB UD CT-1 L36MM 1/2 CIR TAPR PNT VCP261H

## (undated) DEVICE — PADDING,UNDERCAST,COTTON, 4"X4YD STERILE: Brand: MEDLINE

## (undated) DEVICE — 3M™ STERI-DRAPE™ U-DRAPE 1015: Brand: STERI-DRAPE™

## (undated) DEVICE — TRANSFER SET 3": Brand: MEDLINE INDUSTRIES, INC.

## (undated) DEVICE — HANDPIECE SET WITH HIGH FLOW TIP AND SUCTION TUBE: Brand: INTERPULSE

## (undated) DEVICE — ADHESIVE SKIN CLOSURE WND 8.661X1.5 IN 22 CM LIQUIBAND SECUR

## (undated) DEVICE — DRAPE C ARM W/ POLY STRP W42XL72IN FOR MOB XR

## (undated) DEVICE — SYRINGE MED 10ML TRNSLUC BRL PLUNG BLK MRK POLYPR CTRL

## (undated) DEVICE — SUTURE VICRYL + SZ 2-0 L27IN ABSRB CLR CT-1 1/2 CIR TAPERCUT VCP259H

## (undated) DEVICE — GLOVE,SURG,SENSICARE SLT,LF,PF,7.5: Brand: MEDLINE

## (undated) DEVICE — GLOVE SURG SZ 75 L12IN FNGR THK79MIL GRN LTX FREE

## (undated) DEVICE — BIPOLAR SEALER 23-112-1 AQM 6.0: Brand: AQUAMANTYS ®

## (undated) DEVICE — DRESSING ALG W4XL8IN AG FOAM SUPERABSORBENT SIL ANTIMIC

## (undated) DEVICE — PAD,NON-ADHERENT,3X8,STERILE,LF,1/PK: Brand: MEDLINE

## (undated) DEVICE — SUTURE ABSORBABLE MONOFILAMENT 1 OS-8 36 CM 40 MM VIO PDS +

## (undated) DEVICE — RESTRAINT EXT ANK WRST LT BLU FOAM 2 STRP SIDE BCKL HK AND

## (undated) DEVICE — 3M™ COBAN™ NL STERILE NON-LATEX SELF-ADHERENT WRAP, 2084S, 4 IN X 5 YD (10 CM X 4,5 M), 18 ROLLS/CASE: Brand: 3M™ COBAN™

## (undated) DEVICE — POSITIONER,HEAD,RING CUSHION,9IN,32CS: Brand: MEDLINE

## (undated) DEVICE — SUTURE VICRYL + SZ 0 L27IN ABSRB UD L36MM CT-1 1/2 CIR VCPP41D

## (undated) DEVICE — SUTURE VICRYL + SZ 2-0 L36IN ABSRB UD L36MM CT-1 1/2 CIR VCP945H

## (undated) DEVICE — SUTURE VICRYL SZ 0 L36IN ABSRB UD CT-1 L36MM 1/2 CIR TAPR PNT VCP946H

## (undated) DEVICE — SUTURE VICRYL + SZ 0 L27IN ABSRB UD CT-1 L36MM 1/2 CIR TAPR VCP260H

## (undated) DEVICE — SUTURE ETHIBOND EXCEL SZ 5 L30IN NONABSORBABLE GRN L48MM V-40 MB46G

## (undated) DEVICE — ELECTRODE PT RET AD L9FT HI MOIST COND ADH HYDRGEL CORDED

## (undated) DEVICE — LEGGINGS, PAIR, CLEAR, STERILE: Brand: MEDLINE

## (undated) DEVICE — SUTURE MONOCRYL STRATAFIX SPRL SZ 3-0 L12IN ABSRB UD FS-1 L30X30CM SXMP2B410

## (undated) DEVICE — 450 ML BOTTLE OF 0.05% CHLORHEXIDINE GLUCONATE IN 99.95% STERILE WATER FOR IRRIGATION, USP AND APPLICATOR.: Brand: IRRISEPT ANTIMICROBIAL WOUND LAVAGE

## (undated) DEVICE — GOWN,AURORA,NONREINF,RAGLAN,XXL,STERILE: Brand: MEDLINE